# Patient Record
Sex: MALE | Race: WHITE | NOT HISPANIC OR LATINO | Employment: OTHER | URBAN - METROPOLITAN AREA
[De-identification: names, ages, dates, MRNs, and addresses within clinical notes are randomized per-mention and may not be internally consistent; named-entity substitution may affect disease eponyms.]

---

## 2017-01-05 RX ORDER — METOPROLOL TARTRATE 50 MG/1
TABLET, FILM COATED ORAL
Qty: 60 TABLET | Refills: 0 | Status: SHIPPED | OUTPATIENT
Start: 2017-01-05 | End: 2017-03-09 | Stop reason: SDUPTHER

## 2017-03-09 ENCOUNTER — OFFICE VISIT (OUTPATIENT)
Dept: CARDIOLOGY | Facility: CLINIC | Age: 67
End: 2017-03-09

## 2017-03-09 ENCOUNTER — CLINICAL SUPPORT NO REQUIREMENTS (OUTPATIENT)
Dept: CARDIOLOGY | Facility: CLINIC | Age: 67
End: 2017-03-09

## 2017-03-09 VITALS
SYSTOLIC BLOOD PRESSURE: 140 MMHG | DIASTOLIC BLOOD PRESSURE: 82 MMHG | HEART RATE: 63 BPM | HEIGHT: 71 IN | BODY MASS INDEX: 35.56 KG/M2 | WEIGHT: 254 LBS

## 2017-03-09 DIAGNOSIS — I10 ESSENTIAL HYPERTENSION: ICD-10-CM

## 2017-03-09 DIAGNOSIS — I49.5 SICK SINUS SYNDROME (HCC): Primary | ICD-10-CM

## 2017-03-09 PROCEDURE — 93280 PM DEVICE PROGR EVAL DUAL: CPT | Performed by: INTERNAL MEDICINE

## 2017-03-09 PROCEDURE — 99214 OFFICE O/P EST MOD 30 MIN: CPT | Performed by: INTERNAL MEDICINE

## 2017-03-09 RX ORDER — LEVOTHYROXINE SODIUM 0.1 MG/1
150 TABLET ORAL DAILY
COMMUNITY
End: 2018-01-24 | Stop reason: DRUGHIGH

## 2017-03-09 RX ORDER — METOPROLOL TARTRATE 50 MG/1
50 TABLET, FILM COATED ORAL 2 TIMES DAILY
Qty: 180 TABLET | Refills: 1 | Status: SHIPPED | OUTPATIENT
Start: 2017-03-09 | End: 2017-11-24 | Stop reason: SDUPTHER

## 2017-03-09 NOTE — PROGRESS NOTES
Date of Office Visit: 2017  Encounter Provider: Taz Bazan MD  Place of Service: [facility] CARDIOLOGY  Patient Name: Marco Antonio Kendrick  :1950      Chief Complaint   Patient presents with   • Irregular Heart Beat     History of Present Illness  The patient is a 66-year-old white male with a history of sick sinus syndrome.  He presents back to the office today for an annual follow-up.  Permanent pacemaker was implanted in  because of his arrhythmia.  He has not had any symptoms in the last year with the exception of mild fatigue.    Past Medical History   Diagnosis Date   • Arthritis    • Bladder wall thickening      MILD NON-SPECIFIC URINARY BLADDER....FOLLOWED BY DR COBURN   • Bone marrow suppression      SIGNIFICANT MYELOSUPRESSION FROM FOLFOX-4 CHEMOTHERAPY   • Cancer of sigmoid colon      STAGE III (T3N1M0)   • Disease of thyroid gland      HYPOTHYROIDISM   • Diverticula of colon    • Enlarged prostate    • Headache    • Hyperlipidemia    • Hypertension    • Obesity    • Pneumonia 2015   • PONV (postoperative nausea and vomiting)    • Sick sinus syndrome      S/P LEFT CHEST PACEMAKER IMPLANTATION   • Splenomegaly          Past Surgical History   Procedure Laterality Date   • Appendectomy     • Pacemaker implantation Left    • Cystoscopy       BY DR COBURN   • Colonoscopy  2015     DR. CAGE   • Colon surgery  2011     RESECTION   • Colonoscopy N/A 2016     Procedure: COLONOSCOPY to cecum and ti;  Surgeon: Leonel Cage MD;  Location: [facility] ENDOSCOPY;  Service:            Current Outpatient Prescriptions:   •  aspirin 81 MG chewable tablet, Chew 81 mg daily., Disp: , Rfl:   •  benazepril (LOTENSIN) 10 MG tablet, Take  by mouth daily., Disp: , Rfl:   •  Cholecalciferol (VITAMIN D PO), Take  by mouth., Disp: , Rfl:   •  diphenoxylate-atropine (LOMOTIL) 2.5-0.025 MG per tablet, Take 1 tablet by mouth every 6 (six) hours as needed for  "diarrhea., Disp: , Rfl:   •  levothyroxine (SYNTHROID, LEVOTHROID) 100 MCG tablet, Take 150 mcg by mouth Daily., Disp: , Rfl:   •  metoprolol tartrate (LOPRESSOR) 50 MG tablet, TAKE ONE TABLET BY MOUTH TWICE A DAY **MUST CALL MD FOR APPOINTMENT, Disp: 60 tablet, Rfl: 0  •  potassium chloride (KLOR-CON) 20 MEQ packet, Take 20 mEq by mouth 2 (two) times a day., Disp: , Rfl:   •  rosuvastatin (CRESTOR) 5 MG tablet, Take  by mouth daily., Disp: , Rfl:   •  sertraline (ZOLOFT) 50 MG tablet, Take 50 mg by mouth daily., Disp: , Rfl:       Social History     Social History   • Marital status:      Spouse name: N/A   • Number of children: N/A   • Years of education: N/A     Occupational History   • Not on file.     Social History Main Topics   • Smoking status: Never Smoker   • Smokeless tobacco: Never Used   • Alcohol use Yes      Comment: occasional   • Drug use: No   • Sexual activity: Defer     Other Topics Concern   • Not on file     Social History Narrative         Review of Systems   Constitution: Positive for malaise/fatigue.   HENT: Negative.    Eyes: Negative.    Cardiovascular: Negative.    Respiratory: Negative.    Endocrine: Negative.    Skin: Negative.    Musculoskeletal: Negative.    Gastrointestinal: Negative.    Neurological: Negative.    Psychiatric/Behavioral: Negative.        Procedures    Procedures       Objective:      Visit Vitals   • /82   • Pulse 63   • Ht 70.5\" (179.1 cm)   • Wt 254 lb (115 kg)   • BMI 35.93 kg/m2           Physical Exam   Constitutional: He is oriented to person, place, and time. He appears well-developed and well-nourished.   HENT:   Head: Normocephalic.   Eyes: Pupils are equal, round, and reactive to light.   Neck: Normal range of motion. No JVD present. Carotid bruit is not present. No thyromegaly present.   Cardiovascular: Normal rate, regular rhythm, S1 normal, S2 normal, normal heart sounds and intact distal pulses.  Exam reveals no gallop and no friction rub.  "   No murmur heard.  Pulmonary/Chest: Effort normal and breath sounds normal.   Abdominal: Soft. Bowel sounds are normal.   Musculoskeletal: He exhibits no edema.   Neurological: He is alert and oriented to person, place, and time.   Skin: Skin is warm, dry and intact. No erythema.   Psychiatric: He has a normal mood and affect.   Vitals reviewed.          Assessment:       Diagnosis Plan   1. Sick sinus syndrome     2. Essential hypertension              Plan:     No changes in medication are made today.  The patient will follow-up in a year

## 2017-04-17 ENCOUNTER — LAB (OUTPATIENT)
Dept: LAB | Facility: HOSPITAL | Age: 67
End: 2017-04-17

## 2017-04-17 DIAGNOSIS — C18.7 CANCER OF SIGMOID COLON (HCC): ICD-10-CM

## 2017-04-17 LAB
ALBUMIN SERPL-MCNC: 4.1 G/DL (ref 3.5–5.2)
ALBUMIN/GLOB SERPL: 1.4 G/DL (ref 1.1–2.4)
ALP SERPL-CCNC: 51 U/L (ref 38–116)
ALT SERPL W P-5'-P-CCNC: 15 U/L (ref 0–41)
ANION GAP SERPL CALCULATED.3IONS-SCNC: 12.6 MMOL/L
AST SERPL-CCNC: 16 U/L (ref 0–40)
BILIRUB SERPL-MCNC: 0.4 MG/DL (ref 0.1–1.2)
BUN BLD-MCNC: 14 MG/DL (ref 6–20)
BUN/CREAT SERPL: 12.4 (ref 7.3–30)
CALCIUM SPEC-SCNC: 8.3 MG/DL (ref 8.5–10.2)
CEA SERPL-MCNC: 1.13 NG/ML
CHLORIDE SERPL-SCNC: 103 MMOL/L (ref 98–107)
CO2 SERPL-SCNC: 25.4 MMOL/L (ref 22–29)
CREAT BLD-MCNC: 1.13 MG/DL (ref 0.7–1.3)
DEPRECATED RDW RBC AUTO: 43.4 FL (ref 37–49)
ERYTHROCYTE [DISTWIDTH] IN BLOOD BY AUTOMATED COUNT: 12.8 % (ref 11.7–14.5)
GFR SERPL CREATININE-BSD FRML MDRD: 65 ML/MIN/1.73
GLOBULIN UR ELPH-MCNC: 2.9 GM/DL (ref 1.8–3.5)
GLUCOSE BLD-MCNC: 141 MG/DL (ref 74–124)
HCT VFR BLD AUTO: 42.2 % (ref 40–49)
HGB BLD-MCNC: 13.9 G/DL (ref 13.5–16.5)
MCH RBC QN AUTO: 30.5 PG (ref 27–33)
MCHC RBC AUTO-ENTMCNC: 32.9 G/DL (ref 32–35)
MCV RBC AUTO: 92.7 FL (ref 83–97)
PLATELET # BLD AUTO: 192 10*3/MM3 (ref 150–375)
PMV BLD AUTO: 10.8 FL (ref 8.9–12.1)
POTASSIUM BLD-SCNC: 4.1 MMOL/L (ref 3.5–4.7)
PROT SERPL-MCNC: 7 G/DL (ref 6.3–8)
RBC # BLD AUTO: 4.55 10*6/MM3 (ref 4.3–5.5)
SODIUM BLD-SCNC: 141 MMOL/L (ref 134–145)
WBC NRBC COR # BLD: 5.21 10*3/MM3 (ref 4–10)

## 2017-04-17 PROCEDURE — 85027 COMPLETE CBC AUTOMATED: CPT

## 2017-04-17 PROCEDURE — 80053 COMPREHEN METABOLIC PANEL: CPT

## 2017-04-17 PROCEDURE — 36415 COLL VENOUS BLD VENIPUNCTURE: CPT

## 2017-04-17 PROCEDURE — 82378 CARCINOEMBRYONIC ANTIGEN: CPT

## 2017-04-24 ENCOUNTER — APPOINTMENT (OUTPATIENT)
Dept: LAB | Facility: HOSPITAL | Age: 67
End: 2017-04-24

## 2017-04-24 ENCOUNTER — OFFICE VISIT (OUTPATIENT)
Dept: ONCOLOGY | Facility: CLINIC | Age: 67
End: 2017-04-24

## 2017-04-24 VITALS
BODY MASS INDEX: 35.56 KG/M2 | HEART RATE: 64 BPM | DIASTOLIC BLOOD PRESSURE: 84 MMHG | TEMPERATURE: 97.8 F | RESPIRATION RATE: 18 BRPM | SYSTOLIC BLOOD PRESSURE: 142 MMHG | WEIGHT: 248.4 LBS | HEIGHT: 70 IN | OXYGEN SATURATION: 97 %

## 2017-04-24 DIAGNOSIS — C18.7 CANCER OF SIGMOID COLON (HCC): Primary | ICD-10-CM

## 2017-04-24 PROCEDURE — G0463 HOSPITAL OUTPT CLINIC VISIT: HCPCS | Performed by: INTERNAL MEDICINE

## 2017-04-24 PROCEDURE — 99213 OFFICE O/P EST LOW 20 MIN: CPT | Performed by: INTERNAL MEDICINE

## 2017-04-24 RX ORDER — LOVASTATIN 40 MG/1
40 TABLET ORAL NIGHTLY
COMMUNITY
Start: 2017-04-10 | End: 2018-09-14

## 2017-04-24 RX ORDER — SERTRALINE HYDROCHLORIDE 100 MG/1
150 TABLET, FILM COATED ORAL DAILY
COMMUNITY
Start: 2017-04-13 | End: 2018-03-26 | Stop reason: SDUPTHER

## 2017-04-24 NOTE — PROGRESS NOTES
Subjective .     REASONS FOR FOLLOWUP:  Colon cancer    HISTORY OF PRESENT ILLNESS:  The patient is a 66 y.o. year old male  who is here for follow-up with the above-mentioned history.    Denies nausea   Denies weight loss.  Denies pain.  Denies hematochezia or melena.    Denies any problems with bowel movements.      Past Medical History:   Diagnosis Date   • Arthritis    • Bladder wall thickening     MILD NON-SPECIFIC URINARY BLADDER....FOLLOWED BY DR COBURN   • Bone marrow suppression     SIGNIFICANT MYELOSUPRESSION FROM FOLFOX-4 CHEMOTHERAPY   • Cancer of sigmoid colon     STAGE III (T3N1M0)   • Disease of thyroid gland     HYPOTHYROIDISM   • Diverticula of colon    • Enlarged prostate    • H/O allergy to eggs    • Headache    • Hyperlipidemia    • Hypertension    • Hyperthyroidism    • Obesity    • Pneumonia 11/2015   • PONV (postoperative nausea and vomiting)    • Sick sinus syndrome     S/P LEFT CHEST PACEMAKER IMPLANTATION   • Splenomegaly        HEMATOLOGIC/ONCOLOGIC HISTORY:  (History from previous dates can be found in the separate document.)    MEDICATIONS    Current Outpatient Prescriptions:   •  aspirin 81 MG chewable tablet, Chew 81 mg daily., Disp: , Rfl:   •  benazepril (LOTENSIN) 10 MG tablet, Take  by mouth daily., Disp: , Rfl:   •  Cholecalciferol (VITAMIN D PO), Take  by mouth., Disp: , Rfl:   •  diphenoxylate-atropine (LOMOTIL) 2.5-0.025 MG per tablet, Take 1 tablet by mouth every 6 (six) hours as needed for diarrhea., Disp: , Rfl:   •  levothyroxine (SYNTHROID, LEVOTHROID) 100 MCG tablet, Take 150 mcg by mouth Daily., Disp: , Rfl:   •  lovastatin (MEVACOR) 40 MG tablet, , Disp: , Rfl:   •  metoprolol tartrate (LOPRESSOR) 50 MG tablet, Take 1 tablet by mouth 2 (Two) Times a Day., Disp: 180 tablet, Rfl: 1  •  potassium chloride (KLOR-CON) 20 MEQ packet, Take 20 mEq by mouth 2 (two) times a day., Disp: , Rfl:   •  rosuvastatin (CRESTOR) 5 MG tablet, Take  by mouth daily., Disp: , Rfl:   •   "sertraline (ZOLOFT) 100 MG tablet, Take 150 mg by mouth Daily., Disp: , Rfl:     ALLERGIES:     Allergies   Allergen Reactions   • Eggs Or Egg-Derived Products Nausea And Vomiting       SOCIAL HISTORY:       Social History     Social History   • Marital status:      Spouse name: Carmelita   • Number of children: 2   • Years of education: N/A     Occupational History   •  Retired     Social History Main Topics   • Smoking status: Never Smoker   • Smokeless tobacco: Never Used   • Alcohol use Yes      Comment: occasional   • Drug use: No   • Sexual activity: Defer     Other Topics Concern   • Not on file     Social History Narrative         FAMILY HISTORY:  Family History   Problem Relation Age of Onset   • Colon cancer Father    • Melanoma Brother        REVIEW OF SYSTEMS:  GENERAL: No change in appetite or weight;   No fevers, chills, sweats.    SKIN: No nonhealing lesions.   No rashes.  HEME/LYMPH: No easy bruising, bleeding.   No swollen nodes.   EYES: No vision changes or diplopia.   ENT: No tinnitus, hearing loss, gum bleeding, epistaxis, hoarseness or dysphagia.   RESPIRATORY: No cough, shortness of breath, hemoptysis or wheezing.   CVS: No chest pain, palpitations, orthopnea, dyspnea on exertion or PND.   GI: No melena or hematochezia.   No abdominal pain.  No nausea, vomiting, constipation, diarrhea  : No lower tract obstructive symptoms, dysuria or hematuria.   MUSCULOSKELETAL: No bone pain.  No joint stiffness.   NEUROLOGICAL: No global weakness, loss of consciousness or seizures.   PSYCHIATRIC: No increased nervousness, mood changes or depression.          Objective    Vitals:    04/24/17 1312   BP: 142/84   Pulse: 64   Resp: 18   Temp: 97.8 °F (36.6 °C)   TempSrc: Oral   SpO2: 97%   Weight: 248 lb 6.4 oz (113 kg)   Height: 70.08\" (178 cm)   PainSc: 0-No pain  Comment: Headaches off and on     Current Status 4/24/2017   ECOG score 0      PHYSICAL EXAM:    GENERAL:  Well-developed, well-nourished in " no acute distress.   SKIN:  Warm, dry without rashes, purpura or petechiae.  HEAD:  Normocephalic.  EYES:  Pupils equal, round and reactive to light.  EOMs intact.  Conjunctivae normal.  EARS:  Hearing intact.  NOSE:  Septum midline.  No excoriations or nasal discharge.  MOUTH:  Tongue is well-papillated; no stomatitis or ulcers.  Lips normal.  THROAT:  Oropharynx without lesions or exudates.  NECK:  Supple with good range of motion; no thyromegaly or masses, no JVD.  LYMPHATICS:  No cervical, supraclavicular, axillary or inguinal adenopathy.  CHEST:  Lungs clear to percussion and auscultation. Good airflow.  CARDIAC:  Regular rate and rhythm without murmurs, rubs or gallops. Normal S1,S2.  ABDOMEN:  Soft, nontender with no organomegaly or masses.  EXTREMITIES:  No clubbing, cyanosis or edema.  NEUROLOGICAL:  Cranial Nerves II-XII grossly intact.  No focal neurological deficits.  PSYCHIATRIC:  Normal affect and mood.      RECENT LABS:        WBC   Date/Time Value Ref Range Status   04/17/2017 02:22 PM 5.21 4.00 - 10.00 10*3/mm3 Final     Hemoglobin   Date/Time Value Ref Range Status   04/17/2017 02:22 PM 13.9 13.5 - 16.5 g/dL Final     Platelets   Date/Time Value Ref Range Status   04/17/2017 02:22  150 - 375 10*3/mm3 Final       Assessment/Plan     ASSESSMENT:  1.  Stage III colon cancer in 2011.  Appears to remain in remission.  Recent CEA unremarkable.    2.  Mild nonspecific urinary bladder wall thickening on CAT scan 5/6/14 which was similar to 4/30/13 CAT scan.  He saw Dr. Garcia of urology and a cystoscopy was unremarkable.  On CAT scan 4/13/16 he still has the mild nonspecific urinary bladder wall thickening of uncertain significance.  Since this is been worked up in the past, I did not send him to Dr. Garcia again.    3.  Obesity.  We discussed the benefits of weight loss such as reducing risk of future cancers as well as helping with other things such as cardiovascular health and blood sugar  control.    PLAN:  · M.D. 1 year.  One week prior:  · CBC, CMP, CEA  · Patient wants to maintain annual follow-up  · Colonoscopy 7/25/16, Dr. Cage: Unremarkable.  Next colonoscopy plan 5 years later.  · Planning no more scheduled CT scans.

## 2017-06-12 ENCOUNTER — CLINICAL SUPPORT NO REQUIREMENTS (OUTPATIENT)
Dept: CARDIOLOGY | Facility: CLINIC | Age: 67
End: 2017-06-12

## 2017-06-12 DIAGNOSIS — I49.5 SICK SINUS SYNDROME (HCC): Primary | ICD-10-CM

## 2017-06-12 PROCEDURE — 93296 REM INTERROG EVL PM/IDS: CPT | Performed by: INTERNAL MEDICINE

## 2017-06-12 PROCEDURE — 93294 REM INTERROG EVL PM/LDLS PM: CPT | Performed by: INTERNAL MEDICINE

## 2017-07-05 ENCOUNTER — TRANSCRIBE ORDERS (OUTPATIENT)
Dept: ADMINISTRATIVE | Facility: HOSPITAL | Age: 67
End: 2017-07-05

## 2017-07-05 ENCOUNTER — HOSPITAL ENCOUNTER (OUTPATIENT)
Dept: GENERAL RADIOLOGY | Facility: HOSPITAL | Age: 67
Discharge: HOME | End: 2017-07-05
Admitting: NURSE PRACTITIONER

## 2017-07-05 DIAGNOSIS — T14.8XXA HEMATOMA: ICD-10-CM

## 2017-07-05 DIAGNOSIS — T14.8XXA HEMATOMA: Primary | ICD-10-CM

## 2017-07-05 PROCEDURE — 74000 HC ABDOMEN KUB: CPT

## 2017-09-13 ENCOUNTER — TELEPHONE (OUTPATIENT)
Dept: CARDIOLOGY | Facility: CLINIC | Age: 67
End: 2017-09-13

## 2017-09-13 NOTE — TELEPHONE ENCOUNTER
Pt called. He has an appt with his dentist tomorrow and would like to know if he needs antibiotics.     Please advise.     Pt can be reached at [phone]

## 2017-09-25 ENCOUNTER — CLINICAL SUPPORT NO REQUIREMENTS (OUTPATIENT)
Dept: CARDIOLOGY | Facility: CLINIC | Age: 67
End: 2017-09-25

## 2017-09-25 DIAGNOSIS — I49.5 SINOATRIAL NODE DYSFUNCTION (HCC): Primary | ICD-10-CM

## 2017-09-25 PROCEDURE — 93280 PM DEVICE PROGR EVAL DUAL: CPT | Performed by: INTERNAL MEDICINE

## 2017-11-25 RX ORDER — METOPROLOL TARTRATE 50 MG/1
TABLET, FILM COATED ORAL
Qty: 180 TABLET | Refills: 0 | Status: SHIPPED | OUTPATIENT
Start: 2017-11-25 | End: 2018-04-23 | Stop reason: SDUPTHER

## 2017-12-27 ENCOUNTER — CLINICAL SUPPORT NO REQUIREMENTS (OUTPATIENT)
Dept: CARDIOLOGY | Facility: CLINIC | Age: 67
End: 2017-12-27

## 2017-12-27 DIAGNOSIS — I49.5 SICK SINUS SYNDROME (HCC): Primary | ICD-10-CM

## 2017-12-27 PROCEDURE — 93296 REM INTERROG EVL PM/IDS: CPT | Performed by: INTERNAL MEDICINE

## 2017-12-27 PROCEDURE — 93294 REM INTERROG EVL PM/LDLS PM: CPT | Performed by: INTERNAL MEDICINE

## 2018-01-24 ENCOUNTER — OFFICE VISIT (OUTPATIENT)
Dept: FAMILY MEDICINE CLINIC | Facility: CLINIC | Age: 68
End: 2018-01-24

## 2018-01-24 VITALS
TEMPERATURE: 98 F | DIASTOLIC BLOOD PRESSURE: 80 MMHG | HEIGHT: 71 IN | WEIGHT: 249 LBS | BODY MASS INDEX: 34.86 KG/M2 | SYSTOLIC BLOOD PRESSURE: 130 MMHG | OXYGEN SATURATION: 98 % | HEART RATE: 73 BPM

## 2018-01-24 DIAGNOSIS — E07.9 DISEASE OF THYROID GLAND: ICD-10-CM

## 2018-01-24 DIAGNOSIS — C18.7 CANCER OF SIGMOID COLON (HCC): Primary | ICD-10-CM

## 2018-01-24 DIAGNOSIS — Z79.01 LONG-TERM (CURRENT) USE OF ANTICOAGULANTS: ICD-10-CM

## 2018-01-24 DIAGNOSIS — J02.9 ACUTE PHARYNGITIS, UNSPECIFIED ETIOLOGY: ICD-10-CM

## 2018-01-24 DIAGNOSIS — I49.5 SICK SINUS SYNDROME (HCC): ICD-10-CM

## 2018-01-24 DIAGNOSIS — I10 ESSENTIAL HYPERTENSION: ICD-10-CM

## 2018-01-24 DIAGNOSIS — Z95.0 HISTORY OF PERMANENT CARDIAC PACEMAKER PLACEMENT: ICD-10-CM

## 2018-01-24 LAB
EXPIRATION DATE: NORMAL
INTERNAL CONTROL: NORMAL
Lab: NORMAL
S PYO AG THROAT QL: NEGATIVE

## 2018-01-24 PROCEDURE — 87880 STREP A ASSAY W/OPTIC: CPT | Performed by: INTERNAL MEDICINE

## 2018-01-24 PROCEDURE — G0008 ADMIN INFLUENZA VIRUS VAC: HCPCS | Performed by: INTERNAL MEDICINE

## 2018-01-24 PROCEDURE — 90662 IIV NO PRSV INCREASED AG IM: CPT | Performed by: INTERNAL MEDICINE

## 2018-01-24 PROCEDURE — 99203 OFFICE O/P NEW LOW 30 MIN: CPT | Performed by: INTERNAL MEDICINE

## 2018-01-24 RX ORDER — BENAZEPRIL HYDROCHLORIDE 10 MG/1
10 TABLET ORAL DAILY
Qty: 90 TABLET | Refills: 3 | Status: SHIPPED | OUTPATIENT
Start: 2018-01-24 | End: 2018-03-19 | Stop reason: SDUPTHER

## 2018-01-24 RX ORDER — LEVOTHYROXINE SODIUM 0.12 MG/1
125 TABLET ORAL DAILY
COMMUNITY
End: 2018-03-01 | Stop reason: SDUPTHER

## 2018-01-24 NOTE — PROGRESS NOTES
Subjective   Marco Antonio Kendrick is a 67 y.o. male. Patient is here today for establishing care as a new patient.  His previous doctor has quit.  Patient has known problems of hypertension, hypothyroidism, history of sick sinus syndrome and has had a cardiac pacemaker.  PMH-history of colon cancer with surgery and 2011.  Most recent colonoscopy 2016 by Dr. Cage and vesna.  Patient also has some depression controlled by sertraline.  SH-patient is .  He does have an allergy to egg products    his only acute complaint is a sore throat.  He has a granddaughter who is ill    Chief Complaint   Patient presents with   • Hypertension     THYROID- NEW PT HERE TO ESTABLISH           Vitals:    01/24/18 0944   BP: 130/80   Pulse: 73   Temp: 98 °F (36.7 °C)   SpO2: 98%     The following portions of the patient's history were reviewed and updated as appropriate: allergies, current medications, past family history, past medical history, past social history, past surgical history and problem list.    Past Medical History:   Diagnosis Date   • Arthritis    • Bladder wall thickening     MILD NON-SPECIFIC URINARY BLADDER....FOLLOWED BY DR COBURN   • Bone marrow suppression     SIGNIFICANT MYELOSUPRESSION FROM FOLFOX-4 CHEMOTHERAPY   • Cancer of sigmoid colon     STAGE III (T3N1M0)   • Disease of thyroid gland     HYPOTHYROIDISM   • Diverticula of colon    • Enlarged prostate    • H/O allergy to eggs    • Headache    • Hyperlipidemia    • Hypertension    • Hyperthyroidism    • Obesity    • Pneumonia 11/2015   • PONV (postoperative nausea and vomiting)    • Sick sinus syndrome     S/P LEFT CHEST PACEMAKER IMPLANTATION   • Splenomegaly       Allergies   Allergen Reactions   • Eggs Or Egg-Derived Products Nausea And Vomiting      Social History     Social History   • Marital status:      Spouse name: Carmelita   • Number of children: 2   • Years of education: N/A     Occupational History   •  Retired     Social History Main  Topics   • Smoking status: Never Smoker   • Smokeless tobacco: Never Used   • Alcohol use Yes      Comment: occasional   • Drug use: No   • Sexual activity: Defer     Other Topics Concern   • Not on file     Social History Narrative        Current Outpatient Prescriptions:   •  aspirin 81 MG chewable tablet, Chew 81 mg daily., Disp: , Rfl:   •  benazepril (LOTENSIN) 10 MG tablet, Take 1 tablet by mouth Daily., Disp: 90 tablet, Rfl: 3  •  Cholecalciferol (VITAMIN D PO), Take  by mouth., Disp: , Rfl:   •  levothyroxine (SYNTHROID, LEVOTHROID) 125 MCG tablet, Take 125 mcg by mouth Daily., Disp: , Rfl:   •  lovastatin (MEVACOR) 40 MG tablet, , Disp: , Rfl:   •  metoprolol tartrate (LOPRESSOR) 50 MG tablet, TAKE ONE TABLET BY MOUTH TWICE A DAY, Disp: 180 tablet, Rfl: 0  •  potassium chloride (KLOR-CON) 20 MEQ packet, Take 20 mEq by mouth 2 (two) times a day., Disp: , Rfl:   •  sertraline (ZOLOFT) 100 MG tablet, Take 150 mg by mouth Daily., Disp: , Rfl:      Objective     History of Present Illness     Review of Systems   Constitutional: Positive for chills. Negative for fever.   HENT: Positive for congestion, postnasal drip, rhinorrhea and sore throat.    Eyes: Negative.    Respiratory: Negative.    Cardiovascular: Negative.    Gastrointestinal: Negative.    Endocrine: Negative.    Genitourinary: Negative.    Musculoskeletal: Negative.    Skin: Negative.    Neurological: Negative.    Psychiatric/Behavioral: Negative.        Physical Exam   Constitutional: He is oriented to person, place, and time. He appears well-developed and well-nourished.   Pleasant, cooperative no distress blood pressure 138/80   HENT:   Head: Normocephalic and atraumatic.   Mouth/Throat: Uvula is midline, oropharynx is clear and moist and mucous membranes are normal. No tonsillar exudate.   There is some erythema in the left tonsillar pillar area but no exudate   Eyes: Conjunctivae are normal. Pupils are equal, round, and reactive to light. No  scleral icterus.   Neck: Normal range of motion. Neck supple. No thyromegaly present.   Cardiovascular: Normal rate, regular rhythm and normal heart sounds.    Pulmonary/Chest: Effort normal and breath sounds normal. No respiratory distress. He has no wheezes. He has no rales.   Musculoskeletal: Normal range of motion. He exhibits no edema.   Lymphadenopathy:     He has no cervical adenopathy.   Neurological: He is alert and oriented to person, place, and time.   Skin: Skin is warm and dry.   Psychiatric: He has a normal mood and affect. His behavior is normal.   Nursing note and vitals reviewed.      ASSESSMENT  rapid strep test was negative.  #1-hypertension, reasonable control  #2-history of hypothyroidism  #3-history of colon cancer 2011  #4-history of cardiac pacemaker  #5-hyperlipidemia  #6-acute pharyngitis, possibly viral      Problem List Items Addressed This Visit        Cardiovascular and Mediastinum    Sick sinus syndrome    BP (high blood pressure)    Relevant Medications    benazepril (LOTENSIN) 10 MG tablet       Digestive    Cancer of sigmoid colon - Primary       Endocrine    Disease of thyroid gland    Relevant Medications    levothyroxine (SYNTHROID, LEVOTHROID) 125 MCG tablet       Other    Long-term (current) use of anticoagulants    History of permanent cardiac pacemaker placement      Other Visit Diagnoses     Acute pharyngitis, unspecified etiology        Relevant Orders    POC Rapid Strep A          PLAN  The patient received a flu shot today.  The patient will use symptomatic care for his sore throat.  I would like to recheck the patient in one month with a CBC, CMP, NMR lipid panel, TSH and free T4 and PSA    There are no Patient Instructions on file for this visit.  No Follow-up on file.

## 2018-02-14 DIAGNOSIS — Z11.59 NEED FOR HEPATITIS C SCREENING TEST: ICD-10-CM

## 2018-02-14 DIAGNOSIS — E07.9 DISEASE OF THYROID GLAND: ICD-10-CM

## 2018-02-14 DIAGNOSIS — Z12.5 ENCOUNTER FOR SCREENING FOR MALIGNANT NEOPLASM OF PROSTATE: ICD-10-CM

## 2018-02-14 DIAGNOSIS — I10 ESSENTIAL HYPERTENSION: ICD-10-CM

## 2018-02-21 LAB
ALBUMIN SERPL-MCNC: 3.9 G/DL (ref 3.5–5.2)
ALBUMIN/GLOB SERPL: 1.3 G/DL
ALP SERPL-CCNC: 55 U/L (ref 39–117)
ALT SERPL-CCNC: 15 U/L (ref 1–41)
AST SERPL-CCNC: 14 U/L (ref 1–40)
BASOPHILS # BLD AUTO: 0.07 10*3/MM3 (ref 0–0.2)
BASOPHILS NFR BLD AUTO: 0.9 % (ref 0–1.5)
BILIRUB SERPL-MCNC: 0.5 MG/DL (ref 0.1–1.2)
BUN SERPL-MCNC: 15 MG/DL (ref 8–23)
BUN/CREAT SERPL: 10.5 (ref 7–25)
CALCIUM SERPL-MCNC: 9.2 MG/DL (ref 8.6–10.5)
CHLORIDE SERPL-SCNC: 103 MMOL/L (ref 98–107)
CHOLEST SERPL-MCNC: 162 MG/DL (ref 0–200)
CO2 SERPL-SCNC: 28.3 MMOL/L (ref 22–29)
CREAT SERPL-MCNC: 1.43 MG/DL (ref 0.76–1.27)
EOSINOPHIL # BLD AUTO: 0.14 10*3/MM3 (ref 0–0.7)
EOSINOPHIL NFR BLD AUTO: 1.9 % (ref 0.3–6.2)
ERYTHROCYTE [DISTWIDTH] IN BLOOD BY AUTOMATED COUNT: 13.9 % (ref 11.5–14.5)
GFR SERPLBLD CREATININE-BSD FMLA CKD-EPI: 49 ML/MIN/1.73
GFR SERPLBLD CREATININE-BSD FMLA CKD-EPI: 59 ML/MIN/1.73
GLOBULIN SER CALC-MCNC: 2.9 GM/DL
GLUCOSE SERPL-MCNC: 127 MG/DL (ref 65–99)
HCT VFR BLD AUTO: 42.6 % (ref 40.4–52.2)
HCV AB S/CO SERPL IA: <0.1 S/CO RATIO (ref 0–0.9)
HCV AB SERPL QL IA: NORMAL
HDLC SERPL-MCNC: 42 MG/DL (ref 40–60)
HGB BLD-MCNC: 13.6 G/DL (ref 13.7–17.6)
IMM GRANULOCYTES # BLD: 0 10*3/MM3 (ref 0–0.03)
IMM GRANULOCYTES NFR BLD: 0 % (ref 0–0.5)
LDLC SERPL CALC-MCNC: 88 MG/DL (ref 0–100)
LDLC/HDLC SERPL: 2.1 {RATIO}
LYMPHOCYTES # BLD AUTO: 3.37 10*3/MM3 (ref 0.9–4.8)
LYMPHOCYTES NFR BLD AUTO: 45 % (ref 19.6–45.3)
MCH RBC QN AUTO: 30.3 PG (ref 27–32.7)
MCHC RBC AUTO-ENTMCNC: 31.9 G/DL (ref 32.6–36.4)
MCV RBC AUTO: 94.9 FL (ref 79.8–96.2)
MONOCYTES # BLD AUTO: 0.5 10*3/MM3 (ref 0.2–1.2)
MONOCYTES NFR BLD AUTO: 6.7 % (ref 5–12)
NEUTROPHILS # BLD AUTO: 3.41 10*3/MM3 (ref 1.9–8.1)
NEUTROPHILS NFR BLD AUTO: 45.5 % (ref 42.7–76)
PLATELET # BLD AUTO: 202 10*3/MM3 (ref 140–500)
POTASSIUM SERPL-SCNC: 4.7 MMOL/L (ref 3.5–5.2)
PROT SERPL-MCNC: 6.8 G/DL (ref 6–8.5)
PSA SERPL-MCNC: 2.21 NG/ML (ref 0–4)
RBC # BLD AUTO: 4.49 10*6/MM3 (ref 4.6–6)
SODIUM SERPL-SCNC: 142 MMOL/L (ref 136–145)
T4 FREE SERPL-MCNC: 0.95 NG/DL (ref 0.93–1.7)
TRIGL SERPL-MCNC: 159 MG/DL (ref 0–150)
TSH SERPL DL<=0.005 MIU/L-ACNC: 9.69 MIU/ML (ref 0.27–4.2)
VLDLC SERPL CALC-MCNC: 31.8 MG/DL (ref 5–40)
WBC # BLD AUTO: 7.49 10*3/MM3 (ref 4.5–10.7)

## 2018-02-27 ENCOUNTER — TELEPHONE (OUTPATIENT)
Dept: FAMILY MEDICINE CLINIC | Facility: CLINIC | Age: 68
End: 2018-02-27

## 2018-02-27 NOTE — TELEPHONE ENCOUNTER
I HAVE CALLED PATIENT DAILY AND LEFT MESSAGES WITH NO RETURN CALL- I HAVE QUESTIONS ABOUT MEDICATIONS BEFORE I CAN SEND THEM OVER.     ----- Message from Alyse Massey MA sent at 2/20/2018  9:39 AM EST -----  Contact: PT  PT SAYS HE NEEDS ALL OF HIS CURRENT MEDICATIONS CALLED INTO THE MAIL ORDER FOR 90 DAY SUPPLY / HE DID NOT HAVE THEM WITH HIM HE WAS HERE FOR LABS AND ADVISED HE NEEDS ALL OF THEM CALLED IN PT CAN BE REACHED [phone]

## 2018-03-01 ENCOUNTER — OFFICE VISIT (OUTPATIENT)
Dept: FAMILY MEDICINE CLINIC | Facility: CLINIC | Age: 68
End: 2018-03-01

## 2018-03-01 VITALS
OXYGEN SATURATION: 95 % | TEMPERATURE: 97.8 F | SYSTOLIC BLOOD PRESSURE: 124 MMHG | HEIGHT: 71 IN | WEIGHT: 249.2 LBS | HEART RATE: 84 BPM | DIASTOLIC BLOOD PRESSURE: 86 MMHG | BODY MASS INDEX: 34.89 KG/M2

## 2018-03-01 DIAGNOSIS — R73.9 HYPERGLYCEMIA: ICD-10-CM

## 2018-03-01 DIAGNOSIS — I10 ESSENTIAL HYPERTENSION: Primary | ICD-10-CM

## 2018-03-01 DIAGNOSIS — E07.9 DISEASE OF THYROID GLAND: ICD-10-CM

## 2018-03-01 PROCEDURE — 99214 OFFICE O/P EST MOD 30 MIN: CPT | Performed by: INTERNAL MEDICINE

## 2018-03-01 PROCEDURE — G0009 ADMIN PNEUMOCOCCAL VACCINE: HCPCS | Performed by: INTERNAL MEDICINE

## 2018-03-01 PROCEDURE — 90670 PCV13 VACCINE IM: CPT | Performed by: INTERNAL MEDICINE

## 2018-03-01 RX ORDER — LEVOTHYROXINE SODIUM 0.15 MG/1
150 TABLET ORAL DAILY
Qty: 90 TABLET | Refills: 3 | Status: SHIPPED | OUTPATIENT
Start: 2018-03-01 | End: 2018-03-19 | Stop reason: SDUPTHER

## 2018-03-01 NOTE — PROGRESS NOTES
Subjective   Marco Antonio Kendrick is a 67 y.o. male. Patient is here today for follow-up on his hypertension, hypothyroidism.  He also has a history of colon cancer and has had a pacemaker placed.  He is generally feeling fine and is had no chest pain, shortness of breath, edema or myalgias.  He thinks his energy level is pretty reasonable.  He has not checked his blood pressure at home.  Chief Complaint   Patient presents with   • Hypothyroidism     HTN- FOLLOW UP LABS          Vitals:    03/01/18 0955   BP: 124/86   Pulse: 84   Temp: 97.8 °F (36.6 °C)   SpO2: 95%     The following portions of the patient's history were reviewed and updated as appropriate: allergies, current medications, past family history, past medical history, past social history, past surgical history and problem list.    Past Medical History:   Diagnosis Date   • Arthritis    • Bladder wall thickening     MILD NON-SPECIFIC URINARY BLADDER....FOLLOWED BY DR COBURN   • Bone marrow suppression     SIGNIFICANT MYELOSUPRESSION FROM FOLFOX-4 CHEMOTHERAPY   • Cancer of sigmoid colon     STAGE III (T3N1M0)   • Disease of thyroid gland     HYPOTHYROIDISM   • Diverticula of colon    • Enlarged prostate    • H/O allergy to eggs    • Headache    • Hyperlipidemia    • Hypertension    • Hyperthyroidism    • Obesity    • Pneumonia 11/2015   • PONV (postoperative nausea and vomiting)    • Sick sinus syndrome     S/P LEFT CHEST PACEMAKER IMPLANTATION   • Splenomegaly       Allergies   Allergen Reactions   • Eggs Or Egg-Derived Products Nausea And Vomiting      Social History     Social History   • Marital status:      Spouse name: Carmelita   • Number of children: 2   • Years of education: N/A     Occupational History   •  Retired     Social History Main Topics   • Smoking status: Never Smoker   • Smokeless tobacco: Never Used   • Alcohol use Yes      Comment: occasional   • Drug use: No   • Sexual activity: Defer     Other Topics Concern   • Not on file      Social History Narrative        Current Outpatient Prescriptions:   •  aspirin 81 MG chewable tablet, Chew 81 mg daily., Disp: , Rfl:   •  benazepril (LOTENSIN) 10 MG tablet, Take 1 tablet by mouth Daily., Disp: 90 tablet, Rfl: 3  •  Cholecalciferol (VITAMIN D PO), Take  by mouth., Disp: , Rfl:   •  levothyroxine (SYNTHROID, LEVOTHROID) 150 MCG tablet, Take 1 tablet by mouth Daily., Disp: 90 tablet, Rfl: 3  •  lovastatin (MEVACOR) 40 MG tablet, , Disp: , Rfl:   •  metoprolol tartrate (LOPRESSOR) 50 MG tablet, TAKE ONE TABLET BY MOUTH TWICE A DAY, Disp: 180 tablet, Rfl: 0  •  potassium chloride (KLOR-CON) 20 MEQ packet, Take 20 mEq by mouth 2 (two) times a day., Disp: , Rfl:   •  sertraline (ZOLOFT) 100 MG tablet, Take 150 mg by mouth Daily., Disp: , Rfl:      Objective     History of Present Illness     Review of Systems   Constitutional: Negative.    HENT: Negative.    Eyes: Negative.    Respiratory: Negative.    Cardiovascular: Negative.    Gastrointestinal: Negative.    Endocrine: Negative.    Genitourinary: Negative.    Musculoskeletal: Negative.    Skin: Negative.    Neurological: Negative.    Psychiatric/Behavioral: Negative.        Physical Exam   Constitutional: He is oriented to person, place, and time. He appears well-developed and well-nourished.   Pleasant, cooperative, in no distress with a blood pressure of 140/80   HENT:   Head: Normocephalic and atraumatic.   Eyes: Conjunctivae are normal. Pupils are equal, round, and reactive to light. No scleral icterus.   Neck: Normal range of motion. Neck supple. No thyromegaly present.   Cardiovascular: Normal rate, regular rhythm and normal heart sounds.    Pulmonary/Chest: Effort normal and breath sounds normal. No respiratory distress. He has no wheezes. He has no rales.   Musculoskeletal: Normal range of motion. He exhibits no edema.   Neurological: He is alert and oriented to person, place, and time.   Skin: Skin is warm and dry.   Psychiatric: He has a  normal mood and affect. His behavior is normal.   Nursing note and vitals reviewed.      ASSESSMENT  CBC has a minimally low RBC and hemoglobin of 13.6 hematocrit is normal.  Indices are essentially normal.  CMP has an elevated sugar of 127 and an elevated creatinine of 1.43 which is new and otherwise is normal.  Lipid panel is fine with a total cholesterol 162, HDL 42, LDL 88 triglycerides 159.  PSA and hepatitis C screens were both normal or negative.  TSH is elevated at 9.69 and free T4, although normal is quite low at 0.95  #1-hypertension, bit borderline today  #2-hyperglycemia, asymptomatic  #3-hypothyroidism, elevated TSH today  #4-elevated creatinine today     Problem List Items Addressed This Visit        Cardiovascular and Mediastinum    BP (high blood pressure) - Primary       Endocrine    Disease of thyroid gland    Relevant Medications    levothyroxine (SYNTHROID, LEVOTHROID) 150 MCG tablet          PLAN  The patient received a Prevnar 13 vaccination today.  I'm increasing his levothyroxin 150 µg daily.  I would like to recheck him in 3 months with a CBC, CMP, hemoglobin A1c, TSH and free T4 and lipid panel    There are no Patient Instructions on file for this visit.  Return in about 3 months (around 6/1/2018) for with labs.

## 2018-03-14 ENCOUNTER — OFFICE VISIT (OUTPATIENT)
Dept: CARDIOLOGY | Facility: CLINIC | Age: 68
End: 2018-03-14

## 2018-03-14 ENCOUNTER — CLINICAL SUPPORT NO REQUIREMENTS (OUTPATIENT)
Dept: CARDIOLOGY | Facility: CLINIC | Age: 68
End: 2018-03-14

## 2018-03-14 VITALS — DIASTOLIC BLOOD PRESSURE: 92 MMHG | HEART RATE: 64 BPM | SYSTOLIC BLOOD PRESSURE: 144 MMHG

## 2018-03-14 DIAGNOSIS — I49.5 SICK SINUS SYNDROME (HCC): Primary | ICD-10-CM

## 2018-03-14 DIAGNOSIS — I10 ESSENTIAL HYPERTENSION: ICD-10-CM

## 2018-03-14 PROCEDURE — 93280 PM DEVICE PROGR EVAL DUAL: CPT | Performed by: INTERNAL MEDICINE

## 2018-03-14 PROCEDURE — 99214 OFFICE O/P EST MOD 30 MIN: CPT | Performed by: INTERNAL MEDICINE

## 2018-03-14 NOTE — PROGRESS NOTES
Date of Office Visit: 2018  Encounter Provider: Taz Bazan MD  Place of Service: [facility]  Patient Name: Marco Antonio Kendrick  :1950      Chief Complaint   Patient presents with   • Sick sinus syndrome     History of Present Illness    An is a 67-year-old white male with a history of sick sinus syndrome: Status post permanent pacemaker implant.  He also has a history of hypertension.  He returns to the office today for follow-up examination.  He does not complain of any chest pain, shortness of breath, lightheadedness, dizziness nor orthopnea.  Occasionally he'll have some fatigue issues.    Past Medical History:   Diagnosis Date   • Arthritis    • Bladder wall thickening     MILD NON-SPECIFIC URINARY BLADDER....FOLLOWED BY DR COBURN   • Bone marrow suppression     SIGNIFICANT MYELOSUPRESSION FROM FOLFOX-4 CHEMOTHERAPY   • Cancer of sigmoid colon     STAGE III (T3N1M0)   • Disease of thyroid gland     HYPOTHYROIDISM   • Diverticula of colon    • Enlarged prostate    • H/O allergy to eggs    • Headache    • Hyperlipidemia    • Hypertension    • Hyperthyroidism    • Obesity    • Pneumonia 2015   • PONV (postoperative nausea and vomiting)    • Sick sinus syndrome     S/P LEFT CHEST PACEMAKER IMPLANTATION   • Splenomegaly          Past Surgical History:   Procedure Laterality Date   • APPENDECTOMY     • COLON SURGERY      RESECTION   • COLONOSCOPY  2015    DR. CGAE   • COLONOSCOPY N/A 2016    Procedure: COLONOSCOPY to cecum and ti;  Surgeon: Leonel Cage MD;  Location: [facility];  Service:    • CYSTOSCOPY      BY DR COBURN   • PACEMAKER IMPLANTATION Left            Current Outpatient Prescriptions:   •  aspirin 81 MG chewable tablet, Chew 81 mg daily., Disp: , Rfl:   •  benazepril (LOTENSIN) 10 MG tablet, Take 1 tablet by mouth Daily., Disp: 90 tablet, Rfl: 3  •  Cholecalciferol (VITAMIN D PO), Take  by mouth., Disp: , Rfl:    •  levothyroxine (SYNTHROID, LEVOTHROID) 150 MCG tablet, Take 1 tablet by mouth Daily., Disp: 90 tablet, Rfl: 3  •  lovastatin (MEVACOR) 40 MG tablet, Take 40 mg by mouth Every Night., Disp: , Rfl:   •  metoprolol tartrate (LOPRESSOR) 50 MG tablet, TAKE ONE TABLET BY MOUTH TWICE A DAY, Disp: 180 tablet, Rfl: 0  •  potassium chloride (KLOR-CON) 20 MEQ packet, Take 20 mEq by mouth 2 (two) times a day., Disp: , Rfl:   •  sertraline (ZOLOFT) 100 MG tablet, Take 150 mg by mouth Daily., Disp: , Rfl:       Social History     Social History   • Marital status:      Spouse name: Carmelita   • Number of children: 2   • Years of education: N/A     Occupational History   •  Retired     Social History Main Topics   • Smoking status: Never Smoker   • Smokeless tobacco: Never Used   • Alcohol use Yes      Comment: occasional   • Drug use: No   • Sexual activity: Defer     Other Topics Concern   • Not on file     Social History Narrative   • No narrative on file         Review of Systems   Constitution: Positive for malaise/fatigue.   HENT: Negative.    Eyes: Negative.    Cardiovascular: Negative.    Respiratory: Negative.    Endocrine: Negative.    Skin: Negative.    Musculoskeletal: Negative.    Gastrointestinal: Negative.    Neurological: Negative.    Psychiatric/Behavioral: Negative.        Procedures    Procedures        Objective:    /92   Pulse 64         Physical Exam   Constitutional: He is oriented to person, place, and time. He appears well-developed and well-nourished.   HENT:   Head: Normocephalic.   Eyes: Pupils are equal, round, and reactive to light.   Neck: Normal range of motion. No JVD present. Carotid bruit is not present. No thyromegaly present.   Cardiovascular: Normal rate, regular rhythm, S1 normal, S2 normal, normal heart sounds and intact distal pulses.  Exam reveals no gallop and no friction rub.    No murmur heard.  Pulmonary/Chest: Effort normal and breath sounds normal.   Abdominal: Soft.  Bowel sounds are normal.   Musculoskeletal: He exhibits no edema.   Neurological: He is alert and oriented to person, place, and time.   Skin: Skin is warm, dry and intact. No erythema.   Psychiatric: He has a normal mood and affect.   Vitals reviewed.          Assessment:       Diagnosis Plan   1. Sick sinus syndrome     2. Essential hypertension       1.  Sick sinus syndrome: Status post permanent pacemaker implant stable at this time  2.  Hypertension: Controlled on medical therapy       Plan:       Follow-up in one year.

## 2018-03-19 RX ORDER — BENAZEPRIL HYDROCHLORIDE 10 MG/1
10 TABLET ORAL DAILY
Qty: 90 TABLET | Refills: 1 | Status: SHIPPED | OUTPATIENT
Start: 2018-03-19 | End: 2018-08-29 | Stop reason: SDUPTHER

## 2018-03-19 RX ORDER — LEVOTHYROXINE SODIUM 0.15 MG/1
150 TABLET ORAL DAILY
Qty: 90 TABLET | Refills: 1 | Status: SHIPPED | OUTPATIENT
Start: 2018-03-19 | End: 2018-06-08 | Stop reason: SDUPTHER

## 2018-03-26 RX ORDER — SERTRALINE HYDROCHLORIDE 100 MG/1
150 TABLET, FILM COATED ORAL DAILY
Qty: 135 TABLET | Refills: 1 | Status: SHIPPED | OUTPATIENT
Start: 2018-03-26 | End: 2018-08-29 | Stop reason: SDUPTHER

## 2018-04-23 RX ORDER — METOPROLOL TARTRATE 50 MG/1
TABLET, FILM COATED ORAL
Qty: 180 TABLET | Refills: 1 | Status: SHIPPED | OUTPATIENT
Start: 2018-04-23 | End: 2018-12-24 | Stop reason: SDUPTHER

## 2018-04-24 ENCOUNTER — LAB (OUTPATIENT)
Dept: LAB | Facility: HOSPITAL | Age: 68
End: 2018-04-24

## 2018-04-24 DIAGNOSIS — C18.7 CANCER OF SIGMOID COLON (HCC): ICD-10-CM

## 2018-04-24 LAB
ALBUMIN SERPL-MCNC: 4 G/DL (ref 3.5–5.2)
ALBUMIN/GLOB SERPL: 1.2 G/DL (ref 1.1–2.4)
ALP SERPL-CCNC: 55 U/L (ref 38–116)
ALT SERPL W P-5'-P-CCNC: 13 U/L (ref 0–41)
ANION GAP SERPL CALCULATED.3IONS-SCNC: 11.1 MMOL/L
AST SERPL-CCNC: 15 U/L (ref 0–40)
BASOPHILS # BLD AUTO: 0.1 10*3/MM3 (ref 0–0.1)
BASOPHILS NFR BLD AUTO: 1.7 % (ref 0–1.1)
BILIRUB SERPL-MCNC: 0.5 MG/DL (ref 0.1–1.2)
BUN BLD-MCNC: 12 MG/DL (ref 6–20)
BUN/CREAT SERPL: 9.6 (ref 7.3–30)
CALCIUM SPEC-SCNC: 9.1 MG/DL (ref 8.5–10.2)
CEA SERPL-MCNC: 1.63 NG/ML
CHLORIDE SERPL-SCNC: 103 MMOL/L (ref 98–107)
CO2 SERPL-SCNC: 27.9 MMOL/L (ref 22–29)
CREAT BLD-MCNC: 1.25 MG/DL (ref 0.7–1.3)
DEPRECATED RDW RBC AUTO: 43.5 FL (ref 37–49)
EOSINOPHIL # BLD AUTO: 0.12 10*3/MM3 (ref 0–0.36)
EOSINOPHIL NFR BLD AUTO: 2.1 % (ref 1–5)
ERYTHROCYTE [DISTWIDTH] IN BLOOD BY AUTOMATED COUNT: 13 % (ref 11.7–14.5)
GFR SERPL CREATININE-BSD FRML MDRD: 57 ML/MIN/1.73
GLOBULIN UR ELPH-MCNC: 3.3 GM/DL (ref 1.8–3.5)
GLUCOSE BLD-MCNC: 139 MG/DL (ref 74–124)
HCT VFR BLD AUTO: 42.8 % (ref 40–49)
HGB BLD-MCNC: 14.1 G/DL (ref 13.5–16.5)
IMM GRANULOCYTES # BLD: 0.01 10*3/MM3 (ref 0–0.03)
IMM GRANULOCYTES NFR BLD: 0.2 % (ref 0–0.5)
LYMPHOCYTES # BLD AUTO: 2.2 10*3/MM3 (ref 1–3.5)
LYMPHOCYTES NFR BLD AUTO: 38.2 % (ref 20–49)
MCH RBC QN AUTO: 30.3 PG (ref 27–33)
MCHC RBC AUTO-ENTMCNC: 32.9 G/DL (ref 32–35)
MCV RBC AUTO: 91.8 FL (ref 83–97)
MONOCYTES # BLD AUTO: 0.34 10*3/MM3 (ref 0.25–0.8)
MONOCYTES NFR BLD AUTO: 5.9 % (ref 4–12)
NEUTROPHILS # BLD AUTO: 2.99 10*3/MM3 (ref 1.5–7)
NEUTROPHILS NFR BLD AUTO: 51.9 % (ref 39–75)
NRBC BLD MANUAL-RTO: 0 /100 WBC (ref 0–0)
PLATELET # BLD AUTO: 206 10*3/MM3 (ref 150–375)
PMV BLD AUTO: 10.6 FL (ref 8.9–12.1)
POTASSIUM BLD-SCNC: 4.2 MMOL/L (ref 3.5–4.7)
PROT SERPL-MCNC: 7.3 G/DL (ref 6.3–8)
RBC # BLD AUTO: 4.66 10*6/MM3 (ref 4.3–5.5)
SODIUM BLD-SCNC: 142 MMOL/L (ref 134–145)
WBC NRBC COR # BLD: 5.76 10*3/MM3 (ref 4–10)

## 2018-04-24 PROCEDURE — 85025 COMPLETE CBC W/AUTO DIFF WBC: CPT | Performed by: INTERNAL MEDICINE

## 2018-04-24 PROCEDURE — 80053 COMPREHEN METABOLIC PANEL: CPT | Performed by: INTERNAL MEDICINE

## 2018-04-24 PROCEDURE — 82378 CARCINOEMBRYONIC ANTIGEN: CPT | Performed by: INTERNAL MEDICINE

## 2018-04-24 PROCEDURE — 36415 COLL VENOUS BLD VENIPUNCTURE: CPT | Performed by: INTERNAL MEDICINE

## 2018-05-01 ENCOUNTER — OFFICE VISIT (OUTPATIENT)
Dept: ONCOLOGY | Facility: CLINIC | Age: 68
End: 2018-05-01

## 2018-05-01 ENCOUNTER — APPOINTMENT (OUTPATIENT)
Dept: LAB | Facility: HOSPITAL | Age: 68
End: 2018-05-01

## 2018-05-01 VITALS
RESPIRATION RATE: 16 BRPM | SYSTOLIC BLOOD PRESSURE: 122 MMHG | DIASTOLIC BLOOD PRESSURE: 80 MMHG | HEIGHT: 71 IN | TEMPERATURE: 99 F | OXYGEN SATURATION: 96 % | WEIGHT: 250 LBS | HEART RATE: 80 BPM | BODY MASS INDEX: 35 KG/M2

## 2018-05-01 DIAGNOSIS — C18.7 CANCER OF SIGMOID COLON (HCC): Primary | ICD-10-CM

## 2018-05-01 PROCEDURE — G0463 HOSPITAL OUTPT CLINIC VISIT: HCPCS | Performed by: INTERNAL MEDICINE

## 2018-05-01 PROCEDURE — 99213 OFFICE O/P EST LOW 20 MIN: CPT | Performed by: INTERNAL MEDICINE

## 2018-05-01 NOTE — PROGRESS NOTES
Subjective .     REASONS FOR FOLLOWUP:  Colon cancer    HISTORY OF PRESENT ILLNESS:  The patient is a 67 y.o. year old male  who is here for follow-up with the above-mentioned history.    Bowel movements regular.  Denies bleeding.  Denies weight loss.  Denies nausea.  Denies pain.  No new problems.    Past Medical History:   Diagnosis Date   • Arthritis    • Bipolar disorder    • Bladder wall thickening     MILD NON-SPECIFIC URINARY BLADDER....FOLLOWED BY DR COBURN   • Bone marrow suppression     SIGNIFICANT MYELOSUPRESSION FROM FOLFOX-4 CHEMOTHERAPY   • Cancer of sigmoid colon     STAGE III (T3N1M0)   • Depression    • Disease of thyroid gland     HYPOTHYROIDISM   • Diverticula of colon    • Enlarged prostate    • H/O allergy to eggs    • Headache    • Hyperlipidemia    • Hypertension    • Hyperthyroidism    • Obesity    • Pneumonia 11/2015   • PONV (postoperative nausea and vomiting)    • Sick sinus syndrome     S/P LEFT CHEST PACEMAKER IMPLANTATION   • Splenomegaly        HEMATOLOGIC/ONCOLOGIC HISTORY:  (History from previous dates can be found in the separate document.)    MEDICATIONS    Current Outpatient Prescriptions:   •  aspirin 81 MG chewable tablet, Chew 81 mg daily., Disp: , Rfl:   •  benazepril (LOTENSIN) 10 MG tablet, Take 1 tablet by mouth Daily., Disp: 90 tablet, Rfl: 1  •  Cholecalciferol (VITAMIN D PO), Take  by mouth., Disp: , Rfl:   •  levothyroxine (SYNTHROID, LEVOTHROID) 150 MCG tablet, Take 1 tablet by mouth Daily., Disp: 90 tablet, Rfl: 1  •  lovastatin (MEVACOR) 40 MG tablet, Take 40 mg by mouth Every Night., Disp: , Rfl:   •  metoprolol tartrate (LOPRESSOR) 50 MG tablet, TAKE ONE TABLET BY MOUTH TWICE A DAY, Disp: 180 tablet, Rfl: 1  •  potassium chloride (KLOR-CON) 20 MEQ packet, Take 20 mEq by mouth 2 (two) times a day., Disp: , Rfl:   •  sertraline (ZOLOFT) 100 MG tablet, Take 1.5 tablets by mouth Daily., Disp: 135 tablet, Rfl: 1    ALLERGIES:     Allergies   Allergen Reactions   •  "Eggs Or Egg-Derived Products Nausea And Vomiting       SOCIAL HISTORY:       Social History     Social History   • Marital status:      Spouse name: Carmelita   • Number of children: 2   • Years of education: N/A     Occupational History   •  Retired     Social History Main Topics   • Smoking status: Never Smoker   • Smokeless tobacco: Never Used   • Alcohol use Yes      Comment: occasional   • Drug use: No   • Sexual activity: Defer     Other Topics Concern   • Not on file     Social History Narrative   • No narrative on file         FAMILY HISTORY:  Family History   Problem Relation Age of Onset   • Colon cancer Father    • Melanoma Brother    • Heart disease Other    • Hypertension Other    • Diabetes Other    • Cancer Other    • Stroke Other        REVIEW OF SYSTEMS:  Review of Systems   Constitutional: Negative for activity change.   HENT: Negative for nosebleeds and trouble swallowing.    Respiratory: Negative for shortness of breath and wheezing.    Cardiovascular: Negative for chest pain and palpitations.   Gastrointestinal: Negative for constipation, diarrhea and nausea.   Genitourinary: Negative for dysuria and hematuria.   Musculoskeletal: Negative for arthralgias and myalgias.   Neurological: Negative for seizures and syncope.   Hematological: Negative for adenopathy. Does not bruise/bleed easily.   Psychiatric/Behavioral: Negative for confusion.           Objective    Vitals:    05/01/18 1603   BP: 122/80   Pulse: 80   Resp: 16   Temp: 99 °F (37.2 °C)   TempSrc: Oral   SpO2: 96%   Weight: 113 kg (250 lb)   Height: 180 cm (70.87\")   PainSc: 0-No pain     Current Status 5/1/2018   ECOG score 0      PHYSICAL EXAM:    CONSTITUTIONAL:  Vital signs reviewed.  No distress, looks comfortable.  EYES:  Conjunctiva and lids unremarkable.  PERRLA  EARS,NOSE,MOUTH,THROAT:  Ears and nose appear unremarkable.  Lips, teeth, gums appear unremarkable.  RESPIRATORY:  Normal respiratory effort.  Lungs clear to " auscultation bilaterally.  CARDIOVASCULAR:  Normal S1, S2.  No murmurs rubs or gallops.  No significant lower extremity edema.  GASTROINTESTINAL: Abdomen appears unremarkable.  Nontender.  No hepatomegaly.  No splenomegaly.  LYMPHATIC:  No cervical, supraclavicular, axillary lymphadenopathy.  MUSCULOSKELETAL:  Unremarkable gait and station.  Unremarkable digits/nails.  No cyanosis or clubbing.  SKIN:  Warm.  No rashes.  PSYCHIATRIC:  Normal judgment and insight.  Normal mood and affect.   RECENT LABS:        WBC   Date/Time Value Ref Range Status   04/24/2018 02:10 PM 5.76 4.00 - 10.00 10*3/mm3 Final     Hemoglobin   Date/Time Value Ref Range Status   04/24/2018 02:10 PM 14.1 13.5 - 16.5 g/dL Final     Platelets   Date/Time Value Ref Range Status   04/24/2018 02:10  150 - 375 10*3/mm3 Final       Assessment/Plan     ASSESSMENT:  1.  Stage III colon cancer in 2011.  Appears to remain in remission.  Recent CEA unremarkable.    2.  Mild nonspecific urinary bladder wall thickening on CAT scan 5/6/14 which was similar to 4/30/13 CAT scan.  He saw Dr. Garcia of urology and a cystoscopy was unremarkable.  On CAT scan 4/13/16 he still has the mild nonspecific urinary bladder wall thickening of uncertain significance.  Since this is been worked up in the past, I did not send him to Dr. Garcia again.    3.  Obesity.  We have discussed the benefits of weight loss such as reducing risk of future cancers as well as helping with other things such as cardiovascular health and blood sugar control.  Unfortunately, his weight remains stable.    PLAN:  · M.D. 1 year.  One week prior:  · CBC, CMP, CEA  · Patient wants to maintain annual follow-up  · Colonoscopy 7/25/16, Dr. Cage: Unremarkable.  Next colonoscopy planned 5 years later.  · Planning no more scheduled CT scans.

## 2018-05-09 ENCOUNTER — OFFICE VISIT (OUTPATIENT)
Dept: FAMILY MEDICINE CLINIC | Facility: CLINIC | Age: 68
End: 2018-05-09

## 2018-05-09 VITALS
HEIGHT: 70 IN | OXYGEN SATURATION: 97 % | TEMPERATURE: 97.5 F | WEIGHT: 245 LBS | SYSTOLIC BLOOD PRESSURE: 130 MMHG | BODY MASS INDEX: 35.07 KG/M2 | HEART RATE: 87 BPM | DIASTOLIC BLOOD PRESSURE: 82 MMHG | RESPIRATION RATE: 17 BRPM

## 2018-05-09 DIAGNOSIS — J01.10 ACUTE FRONTAL SINUSITIS, RECURRENCE NOT SPECIFIED: Primary | ICD-10-CM

## 2018-05-09 PROCEDURE — 99213 OFFICE O/P EST LOW 20 MIN: CPT | Performed by: NURSE PRACTITIONER

## 2018-05-09 RX ORDER — CEFUROXIME AXETIL 250 MG/1
250 TABLET ORAL 2 TIMES DAILY
Qty: 20 TABLET | Refills: 0 | Status: SHIPPED | OUTPATIENT
Start: 2018-05-09 | End: 2019-01-03

## 2018-05-09 NOTE — PROGRESS NOTES
Subjective   Marco Antonio Kendrick is a 67 y.o. male.   Chief Complaint   Patient presents with   • Sore Throat     x 4 days   • Cough     x 4 days     Vitals:    05/09/18 1101   BP: 130/82   Pulse: 87   Resp: 17   Temp: 97.5 °F (36.4 °C)   SpO2: 97%     No LMP for male patient.    History of Present Illness Sinus Pain  Patient complains of congestion, facial pain, nasal congestion, post nasal drip, sinus pressure and sore throat. Onset of symptoms was 4 days ago. Symptoms have been unchanged since that time. He is drinking plenty of fluids.  Past history is significant for allergic rhinitis . Patient is non-smoker.     The following portions of the patient's history were reviewed and updated as appropriate: allergies, current medications, past family history, past medical history, past social history, past surgical history and problem list.    Review of Systems   Constitutional: Positive for fatigue. Negative for chills and fever.   HENT: Positive for congestion, postnasal drip, rhinorrhea, sinus pain, sinus pressure, sneezing and sore throat.    Respiratory: Negative.    Cardiovascular: Negative.    Gastrointestinal: Positive for vomiting (vomited once last night ).   Genitourinary: Negative.    Musculoskeletal: Negative for arthralgias.       Objective   Physical Exam   Constitutional: Vital signs are normal. He appears well-developed and well-nourished. He appears ill. No distress.   HENT:   Right Ear: Tympanic membrane and ear canal normal.   Left Ear: Tympanic membrane and ear canal normal.   Nose: Mucosal edema and rhinorrhea present. Right sinus exhibits frontal sinus tenderness. Right sinus exhibits no maxillary sinus tenderness. Left sinus exhibits frontal sinus tenderness. Left sinus exhibits no maxillary sinus tenderness.   Mouth/Throat: Uvula is midline. Posterior oropharyngeal erythema present.   Cardiovascular: Normal rate and regular rhythm.    Pulmonary/Chest: Effort normal and breath sounds normal.    Neurological: He is alert.   Skin: Skin is warm and dry.   Psychiatric: He has a normal mood and affect.       Assessment/Plan   Marco Antonio was seen today for sore throat and cough.    Diagnoses and all orders for this visit:    Acute frontal sinusitis, recurrence not specified    Other orders  -     cefuroxime (CEFTIN) 250 MG tablet; Take 1 tablet by mouth 2 (Two) Times a Day.      Continue antihistamines   Sinus rinses with saline  Rest and fluids  Follow up in 2 weeks if your symptoms persist, or sooner if your symptoms worsen or if new symptoms develop

## 2018-05-25 DIAGNOSIS — Z13.220 SCREENING FOR CHOLESTEROL LEVEL: Primary | ICD-10-CM

## 2018-05-25 DIAGNOSIS — E07.9 DISEASE OF THYROID GLAND: ICD-10-CM

## 2018-05-25 DIAGNOSIS — R73.9 HYPERGLYCEMIA: ICD-10-CM

## 2018-06-01 LAB
ALBUMIN SERPL-MCNC: 4 G/DL (ref 3.5–5.2)
ALBUMIN/GLOB SERPL: 1.3 G/DL
ALP SERPL-CCNC: 58 U/L (ref 39–117)
ALT SERPL-CCNC: 13 U/L (ref 1–41)
AST SERPL-CCNC: 13 U/L (ref 1–40)
BASOPHILS # BLD AUTO: 0.09 10*3/MM3 (ref 0–0.2)
BASOPHILS NFR BLD AUTO: 1.6 % (ref 0–1.5)
BILIRUB SERPL-MCNC: 0.3 MG/DL (ref 0.1–1.2)
BUN SERPL-MCNC: 14 MG/DL (ref 8–23)
BUN/CREAT SERPL: 11.5 (ref 7–25)
CALCIUM SERPL-MCNC: 9.1 MG/DL (ref 8.6–10.5)
CHLORIDE SERPL-SCNC: 103 MMOL/L (ref 98–107)
CHOLEST SERPL-MCNC: 194 MG/DL (ref 0–200)
CO2 SERPL-SCNC: 27.1 MMOL/L (ref 22–29)
CREAT SERPL-MCNC: 1.22 MG/DL (ref 0.76–1.27)
EOSINOPHIL # BLD AUTO: 0.16 10*3/MM3 (ref 0–0.7)
EOSINOPHIL NFR BLD AUTO: 2.8 % (ref 0.3–6.2)
ERYTHROCYTE [DISTWIDTH] IN BLOOD BY AUTOMATED COUNT: 13.8 % (ref 11.5–14.5)
GFR SERPLBLD CREATININE-BSD FMLA CKD-EPI: 59 ML/MIN/1.73
GFR SERPLBLD CREATININE-BSD FMLA CKD-EPI: 71 ML/MIN/1.73
GLOBULIN SER CALC-MCNC: 3.1 GM/DL
GLUCOSE SERPL-MCNC: 124 MG/DL (ref 65–99)
HBA1C MFR BLD: 6.5 % (ref 4.8–5.6)
HCT VFR BLD AUTO: 44.5 % (ref 40.4–52.2)
HDLC SERPL-MCNC: 43 MG/DL (ref 40–60)
HGB BLD-MCNC: 14.8 G/DL (ref 13.7–17.6)
IMM GRANULOCYTES # BLD: 0.01 10*3/MM3 (ref 0–0.03)
IMM GRANULOCYTES NFR BLD: 0.2 % (ref 0–0.5)
LDLC SERPL CALC-MCNC: 116 MG/DL (ref 0–100)
LDLC/HDLC SERPL: 2.7 {RATIO}
LYMPHOCYTES # BLD AUTO: 2.69 10*3/MM3 (ref 0.9–4.8)
LYMPHOCYTES NFR BLD AUTO: 46.5 % (ref 19.6–45.3)
MCH RBC QN AUTO: 30.5 PG (ref 27–32.7)
MCHC RBC AUTO-ENTMCNC: 33.3 G/DL (ref 32.6–36.4)
MCV RBC AUTO: 91.6 FL (ref 79.8–96.2)
MONOCYTES # BLD AUTO: 0.24 10*3/MM3 (ref 0.2–1.2)
MONOCYTES NFR BLD AUTO: 4.2 % (ref 5–12)
NEUTROPHILS # BLD AUTO: 2.6 10*3/MM3 (ref 1.9–8.1)
NEUTROPHILS NFR BLD AUTO: 44.9 % (ref 42.7–76)
NRBC BLD AUTO-RTO: 0 /100 WBC (ref 0–0)
PLATELET # BLD AUTO: 193 10*3/MM3 (ref 140–500)
POTASSIUM SERPL-SCNC: 4.2 MMOL/L (ref 3.5–5.2)
PROT SERPL-MCNC: 7.1 G/DL (ref 6–8.5)
RBC # BLD AUTO: 4.86 10*6/MM3 (ref 4.6–6)
SODIUM SERPL-SCNC: 143 MMOL/L (ref 136–145)
T4 FREE SERPL-MCNC: 0.93 NG/DL (ref 0.93–1.7)
TRIGL SERPL-MCNC: 174 MG/DL (ref 0–150)
TSH SERPL DL<=0.005 MIU/L-ACNC: 6.54 MIU/ML (ref 0.27–4.2)
VLDLC SERPL CALC-MCNC: 34.8 MG/DL (ref 5–40)
WBC # BLD AUTO: 5.78 10*3/MM3 (ref 4.5–10.7)

## 2018-06-08 ENCOUNTER — OFFICE VISIT (OUTPATIENT)
Dept: FAMILY MEDICINE CLINIC | Facility: CLINIC | Age: 68
End: 2018-06-08

## 2018-06-08 VITALS
SYSTOLIC BLOOD PRESSURE: 130 MMHG | OXYGEN SATURATION: 98 % | DIASTOLIC BLOOD PRESSURE: 80 MMHG | BODY MASS INDEX: 34.88 KG/M2 | HEIGHT: 70 IN | HEART RATE: 86 BPM | TEMPERATURE: 97.9 F | WEIGHT: 243.6 LBS

## 2018-06-08 DIAGNOSIS — E11.9 TYPE 2 DIABETES MELLITUS WITHOUT COMPLICATION, WITHOUT LONG-TERM CURRENT USE OF INSULIN (HCC): ICD-10-CM

## 2018-06-08 DIAGNOSIS — I10 ESSENTIAL HYPERTENSION: Primary | ICD-10-CM

## 2018-06-08 DIAGNOSIS — E07.9 DISEASE OF THYROID GLAND: ICD-10-CM

## 2018-06-08 PROBLEM — E78.5 HYPERLIPIDEMIA: Status: ACTIVE | Noted: 2018-06-08

## 2018-06-08 PROCEDURE — 99214 OFFICE O/P EST MOD 30 MIN: CPT | Performed by: INTERNAL MEDICINE

## 2018-06-08 RX ORDER — LEVOTHYROXINE SODIUM 175 UG/1
175 TABLET ORAL DAILY
Qty: 90 TABLET | Refills: 3 | Status: SHIPPED | OUTPATIENT
Start: 2018-06-08 | End: 2019-01-31 | Stop reason: SDUPTHER

## 2018-06-08 NOTE — PROGRESS NOTES
Subjective   Marco Antonio Kendrick is a 67 y.o. male. Patient is here today for follow-up on his hypertension, hyperlipidemia, diabetes mellitus type 2 and elevated TSH.  Patient's generally been feeling well and has no new acute complaints.  He's had no chest pain, shortness of breath, edema or myalgias.  Chief Complaint   Patient presents with   • Hyperlipidemia     HYPERGLYCEMIA, THYROID- FOLLOW UP LABS          Vitals:    06/08/18 0943   BP: 130/80   Pulse: 86   Temp: 97.9 °F (36.6 °C)   SpO2: 98%     The following portions of the patient's history were reviewed and updated as appropriate: allergies, current medications, past family history, past medical history, past social history, past surgical history and problem list.    Past Medical History:   Diagnosis Date   • Arthritis    • Bipolar disorder    • Bladder wall thickening     MILD NON-SPECIFIC URINARY BLADDER....FOLLOWED BY DR COBURN   • Bone marrow suppression     SIGNIFICANT MYELOSUPRESSION FROM FOLFOX-4 CHEMOTHERAPY   • Cancer of sigmoid colon     STAGE III (T3N1M0)   • Depression    • Disease of thyroid gland     HYPOTHYROIDISM   • Diverticula of colon    • Enlarged prostate    • H/O allergy to eggs    • Headache    • Hyperlipidemia    • Hypertension    • Hyperthyroidism    • Obesity    • Pneumonia 11/2015   • PONV (postoperative nausea and vomiting)    • Sick sinus syndrome     S/P LEFT CHEST PACEMAKER IMPLANTATION   • Splenomegaly       Allergies   Allergen Reactions   • Eggs Or Egg-Derived Products Nausea And Vomiting      Social History     Social History   • Marital status:      Spouse name: Carmelita   • Number of children: 2   • Years of education: N/A     Occupational History   •  Retired     Social History Main Topics   • Smoking status: Never Smoker   • Smokeless tobacco: Never Used   • Alcohol use Yes      Comment: occasional   • Drug use: No   • Sexual activity: Defer     Other Topics Concern   • Not on file     Social History Narrative    • No narrative on file        Current Outpatient Prescriptions:   •  aspirin 81 MG chewable tablet, Chew 81 mg daily., Disp: , Rfl:   •  benazepril (LOTENSIN) 10 MG tablet, Take 1 tablet by mouth Daily., Disp: 90 tablet, Rfl: 1  •  cefuroxime (CEFTIN) 250 MG tablet, Take 1 tablet by mouth 2 (Two) Times a Day., Disp: 20 tablet, Rfl: 0  •  Cholecalciferol (VITAMIN D PO), Take  by mouth., Disp: , Rfl:   •  levothyroxine (SYNTHROID, LEVOTHROID) 175 MCG tablet, Take 1 tablet by mouth Daily., Disp: 90 tablet, Rfl: 3  •  lovastatin (MEVACOR) 40 MG tablet, Take 40 mg by mouth Every Night., Disp: , Rfl:   •  metoprolol tartrate (LOPRESSOR) 50 MG tablet, TAKE ONE TABLET BY MOUTH TWICE A DAY, Disp: 180 tablet, Rfl: 1  •  potassium chloride (KLOR-CON) 20 MEQ packet, Take 20 mEq by mouth 2 (two) times a day., Disp: , Rfl:   •  sertraline (ZOLOFT) 100 MG tablet, Take 1.5 tablets by mouth Daily., Disp: 135 tablet, Rfl: 1     Objective     History of Present Illness     Review of Systems   Constitutional: Negative.    HENT: Negative.    Eyes: Negative.    Respiratory: Negative.    Cardiovascular: Negative.    Gastrointestinal: Negative.    Endocrine: Negative.    Genitourinary: Negative.    Musculoskeletal: Negative.    Skin: Negative.    Neurological: Negative.    Psychiatric/Behavioral: Negative.        Physical Exam   Constitutional: He is oriented to person, place, and time. He appears well-developed and well-nourished.   Pleasant, cooperative and in no distress blood pressure 130/80   HENT:   Head: Normocephalic and atraumatic.   Eyes: Conjunctivae are normal. Pupils are equal, round, and reactive to light. No scleral icterus.   Neck: Normal range of motion. Neck supple.   Cardiovascular: Normal rate, regular rhythm and normal heart sounds.    Pulmonary/Chest: Effort normal and breath sounds normal. No respiratory distress. He has no wheezes. He has no rales.   Musculoskeletal: Normal range of motion. He exhibits no edema.    Neurological: He is alert and oriented to person, place, and time.   Skin: Skin is warm and dry.   Psychiatric: He has a normal mood and affect. His behavior is normal.   Nursing note and vitals reviewed.      ASSESSMENT  CBC is normal.  CMP has an elevated but stable sugar 124 and otherwise was fine and hemoglobin A1c is elevated at 6.5.  TSH was slightly high and free T4 was just barely normal at 0.93.  Lipid panel is a bit higher than usual with total cholesterol 194, HDL 43, .  #1-hypertension, adequately controlled on medication  #2-diabetes mellitus type 2, asymptomatic and diet controlled  #3-hypothyroidism, borderline adequate replacement  #4-hyperlipidemia, slightly higher than usual but asymptomatic     Problem List Items Addressed This Visit        Cardiovascular and Mediastinum    BP (high blood pressure) - Primary       Endocrine    Disease of thyroid gland    Relevant Medications    levothyroxine (SYNTHROID, LEVOTHROID) 175 MCG tablet    Type 2 diabetes mellitus without complication          PLAN  I'm increasing the patient's levothyroxine to 175 µg daily and he will continue his other medicines as now.  I recommended shingles and hepatitis A immunizations.  Plan on rechecking him in 3 months with a CMP, NMR lipid panel, hemoglobin A1c, TSH and free T4 and urine microalbumin    There are no Patient Instructions on file for this visit.  Return in about 3 months (around 9/8/2018) for with labs.

## 2018-07-02 ENCOUNTER — CLINICAL SUPPORT NO REQUIREMENTS (OUTPATIENT)
Dept: CARDIOLOGY | Facility: CLINIC | Age: 68
End: 2018-07-02

## 2018-07-02 DIAGNOSIS — I49.5 SICK SINUS SYNDROME (HCC): Primary | ICD-10-CM

## 2018-07-02 PROCEDURE — 93296 REM INTERROG EVL PM/IDS: CPT | Performed by: INTERNAL MEDICINE

## 2018-07-02 PROCEDURE — 93294 REM INTERROG EVL PM/LDLS PM: CPT | Performed by: INTERNAL MEDICINE

## 2018-08-29 RX ORDER — BENAZEPRIL HYDROCHLORIDE 10 MG/1
TABLET ORAL
Qty: 90 TABLET | Refills: 1 | Status: SHIPPED | OUTPATIENT
Start: 2018-08-29 | End: 2019-01-31 | Stop reason: SDUPTHER

## 2018-08-29 RX ORDER — SERTRALINE HYDROCHLORIDE 100 MG/1
TABLET, FILM COATED ORAL
Qty: 135 TABLET | Refills: 1 | Status: SHIPPED | OUTPATIENT
Start: 2018-08-29 | End: 2019-01-31 | Stop reason: SDUPTHER

## 2018-08-30 DIAGNOSIS — E11.9 TYPE 2 DIABETES MELLITUS WITHOUT COMPLICATION, WITHOUT LONG-TERM CURRENT USE OF INSULIN (HCC): ICD-10-CM

## 2018-08-30 DIAGNOSIS — E07.9 DISEASE OF THYROID GLAND: ICD-10-CM

## 2018-08-30 DIAGNOSIS — E78.5 HYPERLIPIDEMIA, UNSPECIFIED HYPERLIPIDEMIA TYPE: ICD-10-CM

## 2018-09-12 LAB
ALBUMIN SERPL-MCNC: 4.3 G/DL (ref 3.5–5.2)
ALBUMIN/GLOB SERPL: 1.5 G/DL
ALP SERPL-CCNC: 58 U/L (ref 39–117)
ALT SERPL-CCNC: 15 U/L (ref 1–41)
AST SERPL-CCNC: 15 U/L (ref 1–40)
BILIRUB SERPL-MCNC: 0.3 MG/DL (ref 0.1–1.2)
BUN SERPL-MCNC: 16 MG/DL (ref 8–23)
BUN/CREAT SERPL: 12.2 (ref 7–25)
CALCIUM SERPL-MCNC: 9 MG/DL (ref 8.6–10.5)
CHLORIDE SERPL-SCNC: 104 MMOL/L (ref 98–107)
CHOLEST SERPL-MCNC: 184 MG/DL (ref 100–199)
CO2 SERPL-SCNC: 27.4 MMOL/L (ref 22–29)
CREAT SERPL-MCNC: 1.31 MG/DL (ref 0.76–1.27)
GLOBULIN SER CALC-MCNC: 2.9 GM/DL
GLUCOSE SERPL-MCNC: 118 MG/DL (ref 65–99)
HBA1C MFR BLD: 6.5 % (ref 4.8–5.6)
HDL SERPL-SCNC: ABNORMAL UMOL/L
HDLC SERPL-MCNC: 36 MG/DL
LDL SERPL-SCNC: ABNORMAL NMOL/L
LDLC SERPL CALC-MCNC: 119 MG/DL (ref 0–99)
MICROALBUMIN UR-MCNC: 19.7 UG/ML
POTASSIUM SERPL-SCNC: 4.2 MMOL/L (ref 3.5–5.2)
PROT SERPL-MCNC: 7.2 G/DL (ref 6–8.5)
SODIUM SERPL-SCNC: 143 MMOL/L (ref 136–145)
T4 FREE SERPL-MCNC: 1.22 NG/DL (ref 0.93–1.7)
TRIGL SERPL-MCNC: 144 MG/DL (ref 0–149)
TSH SERPL DL<=0.005 MIU/L-ACNC: 1.44 MIU/ML (ref 0.27–4.2)

## 2018-09-14 ENCOUNTER — OFFICE VISIT (OUTPATIENT)
Dept: FAMILY MEDICINE CLINIC | Facility: CLINIC | Age: 68
End: 2018-09-14

## 2018-09-14 VITALS
HEIGHT: 70 IN | WEIGHT: 242.8 LBS | BODY MASS INDEX: 34.76 KG/M2 | OXYGEN SATURATION: 98 % | HEART RATE: 84 BPM | TEMPERATURE: 97.7 F | DIASTOLIC BLOOD PRESSURE: 86 MMHG | SYSTOLIC BLOOD PRESSURE: 144 MMHG

## 2018-09-14 DIAGNOSIS — E07.9 DISEASE OF THYROID GLAND: ICD-10-CM

## 2018-09-14 DIAGNOSIS — E78.5 HYPERLIPIDEMIA, UNSPECIFIED HYPERLIPIDEMIA TYPE: ICD-10-CM

## 2018-09-14 DIAGNOSIS — E11.9 TYPE 2 DIABETES MELLITUS WITHOUT COMPLICATION, WITHOUT LONG-TERM CURRENT USE OF INSULIN (HCC): ICD-10-CM

## 2018-09-14 DIAGNOSIS — I10 ESSENTIAL HYPERTENSION: Primary | ICD-10-CM

## 2018-09-14 PROCEDURE — 99214 OFFICE O/P EST MOD 30 MIN: CPT | Performed by: INTERNAL MEDICINE

## 2018-09-14 RX ORDER — ROSUVASTATIN CALCIUM 10 MG/1
10 TABLET, COATED ORAL DAILY
Qty: 30 TABLET | Refills: 3 | Status: SHIPPED | OUTPATIENT
Start: 2018-09-14 | End: 2019-01-31 | Stop reason: SDUPTHER

## 2018-09-14 RX ORDER — LEVOTHYROXINE SODIUM 0.15 MG/1
TABLET ORAL
COMMUNITY
Start: 2018-07-13 | End: 2018-09-14 | Stop reason: DRUGHIGH

## 2018-09-14 NOTE — PROGRESS NOTES
Subjective   Marco Antonio Kendrick is a 67 y.o. male. Patient is here today for follow-up on his hypertension, hyperlipidemia, diabetes mellitus type 2 and hypothyroidism.  He is generally stable and is had no chest pain, shortness of breath, edema or myalgias.   Chief Complaint   Patient presents with   • Hyperlipidemia     lab follow up           Vitals:    09/14/18 0936   BP: 144/86   Pulse: 84   Temp: 97.7 °F (36.5 °C)   SpO2: 98%     The following portions of the patient's history were reviewed and updated as appropriate: allergies, current medications, past family history, past medical history, past social history, past surgical history and problem list.    Past Medical History:   Diagnosis Date   • Arthritis    • Bipolar disorder (CMS/HCC)    • Bladder wall thickening     MILD NON-SPECIFIC URINARY BLADDER....FOLLOWED BY DR COBURN   • Bone marrow suppression     SIGNIFICANT MYELOSUPRESSION FROM FOLFOX-4 CHEMOTHERAPY   • Cancer of sigmoid colon (CMS/HCC)     STAGE III (T3N1M0)   • Depression    • Disease of thyroid gland     HYPOTHYROIDISM   • Diverticula of colon    • Enlarged prostate    • H/O allergy to eggs    • Headache    • Hyperlipidemia    • Hypertension    • Hyperthyroidism    • Obesity    • Pneumonia 11/2015   • PONV (postoperative nausea and vomiting)    • Sick sinus syndrome (CMS/HCC)     S/P LEFT CHEST PACEMAKER IMPLANTATION   • Splenomegaly       Allergies   Allergen Reactions   • Eggs Or Egg-Derived Products Nausea And Vomiting      Social History     Social History   • Marital status:      Spouse name: Carmelita   • Number of children: 2   • Years of education: N/A     Occupational History   •  Retired     Social History Main Topics   • Smoking status: Never Smoker   • Smokeless tobacco: Never Used   • Alcohol use Yes      Comment: occasional   • Drug use: No   • Sexual activity: Defer     Other Topics Concern   • Not on file     Social History Narrative   • No narrative on file        Current  Outpatient Prescriptions:   •  aspirin 81 MG chewable tablet, Chew 81 mg daily., Disp: , Rfl:   •  benazepril (LOTENSIN) 10 MG tablet, TAKE 1 TABLET EVERY DAY, Disp: 90 tablet, Rfl: 1  •  cefuroxime (CEFTIN) 250 MG tablet, Take 1 tablet by mouth 2 (Two) Times a Day., Disp: 20 tablet, Rfl: 0  •  Cholecalciferol (VITAMIN D PO), Take  by mouth., Disp: , Rfl:   •  levothyroxine (SYNTHROID, LEVOTHROID) 175 MCG tablet, Take 1 tablet by mouth Daily., Disp: 90 tablet, Rfl: 3  •  metoprolol tartrate (LOPRESSOR) 50 MG tablet, TAKE ONE TABLET BY MOUTH TWICE A DAY, Disp: 180 tablet, Rfl: 1  •  potassium chloride (KLOR-CON) 20 MEQ packet, Take 20 mEq by mouth 2 (two) times a day., Disp: , Rfl:   •  sertraline (ZOLOFT) 100 MG tablet, TAKE 1 AND 1/2 TABLETS EVERY DAY, Disp: 135 tablet, Rfl: 1  •  rosuvastatin (CRESTOR) 10 MG tablet, Take 1 tablet by mouth Daily., Disp: 30 tablet, Rfl: 3     Objective     History of Present Illness     Review of Systems   Constitutional: Negative.    HENT: Negative.    Eyes: Negative.    Respiratory: Negative.    Cardiovascular: Negative.    Gastrointestinal: Negative.    Genitourinary: Negative.    Musculoskeletal: Negative.    Skin: Negative.    Neurological: Negative.    Psychiatric/Behavioral: Negative.        Physical Exam   Constitutional: He is oriented to person, place, and time. He appears well-developed and well-nourished.   Pleasant, cooperative and in no distress blood pressure 130/80   HENT:   Head: Normocephalic and atraumatic.   Eyes: Pupils are equal, round, and reactive to light. Conjunctivae are normal. No scleral icterus.   Neck: Normal range of motion. Neck supple.   Cardiovascular: Normal rate, regular rhythm and normal heart sounds.    Pulmonary/Chest: Effort normal and breath sounds normal. No respiratory distress. He has no wheezes. He has no rales.   Musculoskeletal: Normal range of motion. He exhibits no edema.   Neurological: He is alert and oriented to person, place, and  time.   Skin: Skin is warm and dry.   Psychiatric: He has a normal mood and affect. His behavior is normal.   Nursing note and vitals reviewed.      ASSESSMENT  CMP has an elevated but stable sugar 118 and a creatinine of 1.31 and otherwise is normal and hemoglobin A1c is stable at 6.5 and urine microalbumin is acceptable.  His lipid panel is a bit higher than I like with a total cholesterol 184, HDL of 36 with an LDL of 119.  TSH and free T4 are now both normal.  #1-hypertension, reasonably controlled on medication  #2-hyperlipidemia, not optimally controlled  #3-diabetes mellitus type 2, diet controlled  #4-hypothyroidism, now euthyroid on replacement     Problem List Items Addressed This Visit        Cardiovascular and Mediastinum    BP (high blood pressure) - Primary    Hyperlipidemia    Relevant Medications    rosuvastatin (CRESTOR) 10 MG tablet       Endocrine    Disease of thyroid gland    Type 2 diabetes mellitus without complication (CMS/[facility])          PLAN  I recommended the shingles and flu shots.  Patient will continue current medicines as now but I'm going to change him from lovastatin to Crestor 10 mg daily.  I want to recheck him in 4 months with a CMP, lipid panel, hemoglobin A1c and TSH and free T4    There are no Patient Instructions on file for this visit.  Return in about 4 months (around 1/14/2019) for with labs.

## 2018-09-24 RX ORDER — LEVOTHYROXINE SODIUM 0.15 MG/1
TABLET ORAL
Qty: 90 TABLET | Refills: 1 | Status: SHIPPED | OUTPATIENT
Start: 2018-09-24 | End: 2018-10-24

## 2018-10-24 ENCOUNTER — OFFICE VISIT (OUTPATIENT)
Dept: CARDIOLOGY | Facility: CLINIC | Age: 68
End: 2018-10-24

## 2018-10-24 ENCOUNTER — CLINICAL SUPPORT NO REQUIREMENTS (OUTPATIENT)
Dept: CARDIOLOGY | Facility: CLINIC | Age: 68
End: 2018-10-24

## 2018-10-24 VITALS
HEIGHT: 70 IN | SYSTOLIC BLOOD PRESSURE: 136 MMHG | HEART RATE: 62 BPM | OXYGEN SATURATION: 96 % | DIASTOLIC BLOOD PRESSURE: 96 MMHG | BODY MASS INDEX: 33.5 KG/M2 | WEIGHT: 234 LBS

## 2018-10-24 DIAGNOSIS — I49.5 SICK SINUS SYNDROME (HCC): Primary | ICD-10-CM

## 2018-10-24 DIAGNOSIS — Z95.0 HISTORY OF PERMANENT CARDIAC PACEMAKER PLACEMENT: ICD-10-CM

## 2018-10-24 DIAGNOSIS — I10 ESSENTIAL HYPERTENSION: ICD-10-CM

## 2018-10-24 DIAGNOSIS — E78.5 HYPERLIPIDEMIA, UNSPECIFIED HYPERLIPIDEMIA TYPE: ICD-10-CM

## 2018-10-24 DIAGNOSIS — E66.9 OBESITY (BMI 30.0-34.9): ICD-10-CM

## 2018-10-24 PROBLEM — E66.811 OBESITY (BMI 30.0-34.9): Status: ACTIVE | Noted: 2018-10-24

## 2018-10-24 PROCEDURE — 99213 OFFICE O/P EST LOW 20 MIN: CPT | Performed by: NURSE PRACTITIONER

## 2018-10-24 PROCEDURE — 93000 ELECTROCARDIOGRAM COMPLETE: CPT | Performed by: NURSE PRACTITIONER

## 2018-10-24 PROCEDURE — 93280 PM DEVICE PROGR EVAL DUAL: CPT | Performed by: INTERNAL MEDICINE

## 2018-10-24 NOTE — PROGRESS NOTES
Date of Office Visit: 10/24/2018  Encounter Provider: SADAF Holbrook  Place of Service: [facility]  Patient Name: Marco Antonio Kendrick  :1950      Subjective:     Chief Complaint:  Sick Sinus Syndrome    History of Present Illness:  Marco Antonio Kendrick is a pleasant 67 y.o. male who is new to me.  Outside records have been obtained and reviewed by me. The patient has a past medical history of sick sinus syndrome with left chest pacemaker, hypertension, hyperlipidemia, colon cancer, bipolar disorder, thyroid disease, pneumonia and obesity.     The patient was last seen in the office on 3/14/18 by Dr. Bazan. At that time the patient's pacemaker appeared stable and he was not having complaints of chest pain, shortness of breath, dizziness, or orthopnea. He did have some complaints of fatigue. His hypertension was mostly well controlled on medical therapy. Dr. Bazan did not make any changes to his medical regimen and wanted to see him back again in 1 year.   His last remote device check on 18 his pacemaker was functioning appropriately.      He is here today for a follow-up visit.  He has been feeling really well since his last visit.  He denies any chest pain, shortness of breath, swelling of the legs, palpitations, dizziness, lightheadedness, syncope, near-syncope or falls.  He denies any PND or orthopnea. He reports some mild fatigue but nothing that is limiting to him.  He really has no other complaints today.  He states that his wife does say that he snores but he has been tested for sleep apnea by sleep study a few different times most recently within the last year and was not diagnosed with sleep apnea.  He reports that he walks with his wife in the park every other night at least a mile and a half.  He is not having any symptoms or issues with walking.  He reports his weight is down about 15-20 pounds.  His blood pressure is a little elevated today and his  diastolic reading as it was last visit.  He is compliant with his blood pressure medications.  He does not check his blood pressure at home.  He does not following any special type of diet.  He has had some renal insufficiency on his labs his most recent creatinine was 1.31 on his lab work by his PCP Dr. Duenas on 9/7/18.  He had his pacemaker interrogated today, and was functioning appropriately.         Past Medical History:   Diagnosis Date   • Arthritis    • Bipolar disorder (CMS/HCC)    • Bladder wall thickening     MILD NON-SPECIFIC URINARY BLADDER....FOLLOWED BY DR COBURN   • Bone marrow suppression     SIGNIFICANT MYELOSUPRESSION FROM FOLFOX-4 CHEMOTHERAPY   • Cancer of sigmoid colon (CMS/HCC)     STAGE III (T3N1M0)   • Depression    • Disease of thyroid gland     HYPOTHYROIDISM   • Diverticula of colon    • Enlarged prostate    • H/O allergy to eggs    • Headache    • Hyperlipidemia    • Hypertension    • Hyperthyroidism    • Obesity    • Pneumonia 11/2015   • PONV (postoperative nausea and vomiting)    • Sick sinus syndrome (CMS/HCC)     S/P LEFT CHEST PACEMAKER IMPLANTATION   • Splenomegaly      Past Surgical History:   Procedure Laterality Date   • APPENDECTOMY     • COLON SURGERY  2011    RESECTION   • COLONOSCOPY  01/2015    DR. CGAE   • COLONOSCOPY N/A 7/25/2016    Procedure: COLONOSCOPY to cecum and ti;  Surgeon: Leonel Cage MD;  Location: [facility];  Service:    • CYSTOSCOPY      BY DR COBURN   • PACEMAKER IMPLANTATION Left      Outpatient Medications Prior to Visit   Medication Sig Dispense Refill   • aspirin 81 MG chewable tablet Chew 81 mg daily.     • benazepril (LOTENSIN) 10 MG tablet TAKE 1 TABLET EVERY DAY 90 tablet 1   • cefuroxime (CEFTIN) 250 MG tablet Take 1 tablet by mouth 2 (Two) Times a Day. 20 tablet 0   • Cholecalciferol (VITAMIN D PO) Take  by mouth.     • levothyroxine (SYNTHROID, LEVOTHROID) 175 MCG tablet Take 1 tablet by mouth Daily. 90 tablet 3   •  metoprolol tartrate (LOPRESSOR) 50 MG tablet TAKE ONE TABLET BY MOUTH TWICE A  tablet 1   • potassium chloride (KLOR-CON) 20 MEQ packet Take 20 mEq by mouth 2 (two) times a day.     • rosuvastatin (CRESTOR) 10 MG tablet Take 1 tablet by mouth Daily. 30 tablet 3   • sertraline (ZOLOFT) 100 MG tablet TAKE 1 AND 1/2 TABLETS EVERY  tablet 1   • levothyroxine (SYNTHROID, LEVOTHROID) 150 MCG tablet TAKE 1 TABLET EVERY DAY 90 tablet 1     No facility-administered medications prior to visit.        Allergies as of 10/24/2018 - Reviewed 10/24/2018   Allergen Reaction Noted   • Eggs or egg-derived products Nausea And Vomiting 03/03/2016     Social History     Social History   • Marital status:      Spouse name: Carmelita   • Number of children: 2   • Years of education: N/A     Occupational History   •  Retired     Social History Main Topics   • Smoking status: Never Smoker   • Smokeless tobacco: Never Used   • Alcohol use Yes      Comment: occasional   • Drug use: No   • Sexual activity: Defer     Other Topics Concern   • Not on file     Social History Narrative   • No narrative on file     Family History   Problem Relation Age of Onset   • Colon cancer Father    • Melanoma Brother    • Heart disease Other    • Hypertension Other    • Diabetes Other    • Cancer Other    • Stroke Other      Review of Systems   Constitution: Positive for malaise/fatigue. Negative for chills and fever.   HENT: Negative for ear pain, hearing loss, nosebleeds and sore throat.    Eyes: Negative for double vision, pain, vision loss in left eye and vision loss in right eye.   Cardiovascular: Negative for chest pain, claudication, dyspnea on exertion, irregular heartbeat, leg swelling, near-syncope, orthopnea, palpitations, paroxysmal nocturnal dyspnea and syncope.   Respiratory: Negative for cough, shortness of breath, snoring and wheezing.    Endocrine: Negative for cold intolerance and heat intolerance.   Hematologic/Lymphatic:  "Negative for bleeding problem.   Skin: Negative for color change, itching, rash and unusual hair distribution.   Musculoskeletal: Negative for joint pain and joint swelling.   Gastrointestinal: Negative for abdominal pain, diarrhea, hematochezia, melena, nausea and vomiting.   Genitourinary: Negative for decreased libido, frequency, hematuria, hesitancy and incomplete emptying.   Neurological: Negative for excessive daytime sleepiness, dizziness, headaches, light-headedness, loss of balance, numbness, paresthesias and seizures.   Psychiatric/Behavioral: Negative for depression.          Objective:     Vitals:    10/24/18 1416   BP: 136/96   BP Location: Left arm   Patient Position: Sitting   Pulse: 62   SpO2: 96%   Weight: 106 kg (234 lb)   Height: 177.8 cm (70\")     Body mass index is 33.58 kg/m².    PHYSICAL EXAM:  Physical Exam   Constitutional: He is oriented to person, place, and time. He appears well-developed and well-nourished. No distress.   HENT:   Head: Normocephalic and atraumatic.   Eyes: Pupils are equal, round, and reactive to light.   Neck: Neck supple. No JVD present. Carotid bruit is not present.   Cardiovascular: Normal rate, regular rhythm, normal heart sounds and intact distal pulses.    No murmur heard.  Pulses:       Radial pulses are 2+ on the right side, and 2+ on the left side.        Dorsalis pedis pulses are 2+ on the right side, and 2+ on the left side.        Posterior tibial pulses are 2+ on the right side, and 2+ on the left side.   Pulmonary/Chest: Effort normal and breath sounds normal. No accessory muscle usage. No respiratory distress. He has no decreased breath sounds. He has no wheezes. He has no rhonchi. He has no rales. He exhibits no tenderness.   Left chest permanent pacemaker presence. No s/s of infection.   Abdominal: Soft. Bowel sounds are normal. He exhibits no distension. There is no splenomegaly or hepatomegaly. There is no tenderness. There is no rebound.   Obese "   Musculoskeletal: Normal range of motion. He exhibits no edema.   Neurological: He is alert and oriented to person, place, and time.   Skin: Skin is warm, dry and intact. He is not diaphoretic. No erythema.   Psychiatric: He has a normal mood and affect. His speech is normal and behavior is normal. Judgment and thought content normal. Cognition and memory are normal.   Nursing note and vitals reviewed.        ECG 12 Lead  Date/Time: 10/24/2018 2:20 PM  Performed by: ELSIE BARKER  Authorized by: ELSIE BARKER   Comparison: compared with previous ECG from 5/20/2014  Similar to previous ECG  Comparison to previous ECG: Reviewed ECGs with Dr. Bailey  Rhythm: sinus rhythm  Rate: normal  BPM: 62  QRS axis: left  Other findings: LVH  Q waves: I and aVL  Clinical impression: abnormal ECG  Comments: Non-specific ST-T wave abnormalities  IVCD  Indication: Sick Sinus Syndrome            Assessment:       Diagnosis Plan   1. Sick sinus syndrome (CMS/HCC)     2. History of permanent cardiac pacemaker placement     3. Essential hypertension     4. Hyperlipidemia, unspecified hyperlipidemia type     5. Obesity (BMI 30.0-34.9)         Plan:     1. Sick sinus syndrome: s/p permament pacemaker implantation. Stable at this time. Left chest PPM site w/o s/s of infection.   2. Hypertension: On Benazepril 10 mg daily, Metoprolol tartrate  50 mg BID.  His diastolic blood pressure reading was a little high today.  I advised a minimizing sodium intake.  He is going to start monitoring his blood pressure at home over the next month he was going to call me if he has consistent blood pressures greater than 140/90.  We are going to touch base in one month to see if he needs medication titration.   3. Hyperlipidemia: LDL was a little elevated at 116 on 6/1/18.  If this stays persistently elevated on his next repeat labs by his PCP, he will likely need medication titration.On Crestor 10 mg daily. Managed by PCP.   4. Obesity: We discussed  healthy diet and weight loss.  He is going to continue to walk with his wife and tried to increase his activity a little bit.        I advised on the importance of good blood pressure, blood sugar and cholesterol control, as well as avoidance of tobacco and regular exercise and weight loss for cardiovascular disease risk factor modification.         Follow up with Dr. Bazan in 6 months, unless otherwise needed sooner.  I advised the patient to contact our office with any questions or concerns.         Your medication list          Accurate as of 10/24/18 11:59 PM. If you have any questions, ask your nurse or doctor.               CHANGE how you take these medications      Instructions Last Dose Given Next Dose Due   levothyroxine 175 MCG tablet  Commonly known as:  SYNTHROID, LEVOTHROID  What changed:  Another medication with the same name was removed. Continue taking this medication, and follow the directions you see here.  Changed by:  SADAF Holbrook      Take 1 tablet by mouth Daily.          CONTINUE taking these medications      Instructions Last Dose Given Next Dose Due   aspirin 81 MG chewable tablet      Chew 81 mg daily.       benazepril 10 MG tablet  Commonly known as:  LOTENSIN      TAKE 1 TABLET EVERY DAY       cefuroxime 250 MG tablet  Commonly known as:  CEFTIN      Take 1 tablet by mouth 2 (Two) Times a Day.       metoprolol tartrate 50 MG tablet  Commonly known as:  LOPRESSOR      TAKE ONE TABLET BY MOUTH TWICE A DAY       potassium chloride 20 MEQ packet  Commonly known as:  KLOR-CON      Take 20 mEq by mouth 2 (two) times a day.       rosuvastatin 10 MG tablet  Commonly known as:  CRESTOR      Take 1 tablet by mouth Daily.       sertraline 100 MG tablet  Commonly known as:  ZOLOFT      TAKE 1 AND 1/2 TABLETS EVERY DAY       VITAMIN D PO      Take  by mouth.              The above medication changes may not have been made by this provider.  Medication list was updated to reflect medications  patient is currently taking including medication changes and discontinuations made by other healthcare providers.     It has been a pleasure to participate in this patient's care. Please feel free to contact me with any questions or concerns.     Emerita Ramirez, APRN  10/24/2018       Dictated utilizing Dragon Dictation System.

## 2018-11-21 ENCOUNTER — TELEPHONE (OUTPATIENT)
Dept: CARDIOLOGY | Facility: CLINIC | Age: 68
End: 2018-11-21

## 2018-11-21 NOTE — TELEPHONE ENCOUNTER
I called and spoke with the patient's wife regarding his blood pressures recently.  He has been checking it at [facility] and it has been ranging from 100s-150/48-95.  They will bring in his b/p log to his upcoming b/p check. He has been feeling well except for the last few days he has had symptoms of an upper respiratory tract infection.  I advised his wife to purchase a blood pressure cuff that goes on the arm instead of going to [facility].  They will do this and bring it to the blood pressure check on Monday 11/26/18 to be verified.  I will anticipate probably starting 5 mg of amlodipine at that visit.  I also notified him that he would likely be getting a call from the office to switch his appointment to Dr. Bazan schedule as she somehow got put on my schedule and April 2019 and this was an oversight.  They demonstrated understanding.

## 2018-11-26 ENCOUNTER — CLINICAL SUPPORT (OUTPATIENT)
Dept: CARDIOLOGY | Facility: CLINIC | Age: 68
End: 2018-11-26

## 2018-11-26 DIAGNOSIS — I10 ESSENTIAL HYPERTENSION: Primary | ICD-10-CM

## 2018-11-26 RX ORDER — AMLODIPINE BESYLATE 5 MG/1
5 TABLET ORAL DAILY
COMMUNITY
End: 2018-11-26 | Stop reason: SDUPTHER

## 2018-11-26 RX ORDER — AMLODIPINE BESYLATE 5 MG/1
5 TABLET ORAL DAILY
Qty: 30 TABLET | Refills: 3 | Status: SHIPPED | OUTPATIENT
Start: 2018-11-26 | End: 2019-01-31 | Stop reason: SDUPTHER

## 2018-11-26 NOTE — PROGRESS NOTES
Pt present today for BP checkup for Hypertension. Pt BP today Pt cuff Rt arm 141/103 Hr 86 LFT: 156/102 HR 86 LCG cuff RT: 136/90 LFt: 140/90. Per SADAF Velazquez starting pt on Amlodipine 5 mg daily, return in 2 weeks for BP check. Thanks, Vicente    I have reviewed the notes, assessments, and/or procedures performed by ANA Zhang, I concur with her  documentation of Marco Antonio Kendrick.

## 2018-12-13 ENCOUNTER — CLINICAL SUPPORT (OUTPATIENT)
Dept: CARDIOLOGY | Facility: CLINIC | Age: 68
End: 2018-12-13

## 2018-12-13 DIAGNOSIS — I10 ESSENTIAL HYPERTENSION: Primary | ICD-10-CM

## 2018-12-13 NOTE — PROGRESS NOTES
Patient ;here for  BP Check  Patient taking Amlodipine 5mg once daily  Lotensin 10 mg once daily  Metoprolol tart 50mg 1 twice daily      BP reading at home  12/4/18 164/105 pulse 68  12/5/18 148/93 pulse 90  12/6/18 142/92 pulse 91  12/7/18 124/82 pulse 65  12/8/18 142/88 pulse 75  12/9/18 148/91 pulse 83  12/19/18 155/100 pulse 84  12/11/18 140/90 pulse 78  12/12/18 122/81 pulse 81        BP today 128/92   72    Per Emerita patient to increase amlodipine to 10mg once daily  If BP stay consistently  140/90 patient to call us. Keep appt with Dr. Bazan

## 2018-12-13 NOTE — PROGRESS NOTES
I have reviewed the notes, assessments, and/or procedures performed by MI Crowe MA, I concur with her documentation of Marco Antonio Kendrick.

## 2018-12-26 RX ORDER — METOPROLOL TARTRATE 50 MG/1
TABLET, FILM COATED ORAL
Qty: 180 TABLET | Refills: 1 | Status: SHIPPED | OUTPATIENT
Start: 2018-12-26 | End: 2019-09-09 | Stop reason: SDUPTHER

## 2019-01-03 ENCOUNTER — OFFICE VISIT (OUTPATIENT)
Dept: FAMILY MEDICINE CLINIC | Facility: CLINIC | Age: 69
End: 2019-01-03

## 2019-01-03 VITALS
HEART RATE: 85 BPM | SYSTOLIC BLOOD PRESSURE: 122 MMHG | OXYGEN SATURATION: 98 % | DIASTOLIC BLOOD PRESSURE: 80 MMHG | BODY MASS INDEX: 33.5 KG/M2 | HEIGHT: 70 IN | WEIGHT: 234 LBS | RESPIRATION RATE: 16 BRPM | TEMPERATURE: 98.9 F

## 2019-01-03 DIAGNOSIS — J06.9 ACUTE URI: Primary | ICD-10-CM

## 2019-01-03 DIAGNOSIS — J01.40 ACUTE NON-RECURRENT PANSINUSITIS: ICD-10-CM

## 2019-01-03 DIAGNOSIS — J40 BRONCHITIS: ICD-10-CM

## 2019-01-03 PROCEDURE — 99213 OFFICE O/P EST LOW 20 MIN: CPT | Performed by: INTERNAL MEDICINE

## 2019-01-03 RX ORDER — AZITHROMYCIN 250 MG/1
TABLET, FILM COATED ORAL
Qty: 6 TABLET | Refills: 0 | Status: SHIPPED | OUTPATIENT
Start: 2019-01-03 | End: 2019-01-31

## 2019-01-03 NOTE — PROGRESS NOTES
Subjective   Marco Antonio Kendrick is a 68 y.o. male. Patient is here today for an upper respiratory infection it's been going on for a couple of weeks.  He is mainly having some sinus drainage that's a little cloudy but not a lot of pain, and some cough that's slightly productive at times.  He's had no fevers chills pleurisy, respiratory distress or significant myalgias.  He says he gets this usually once a year.  Chief Complaint   Patient presents with   • URI          Vitals:    01/03/19 1410   BP: 122/80   Pulse: 85   Resp: 16   Temp: 98.9 °F (37.2 °C)   SpO2: 98%     The following portions of the patient's history were reviewed and updated as appropriate: allergies, current medications, past family history, past medical history, past social history, past surgical history and problem list.    Past Medical History:   Diagnosis Date   • Arthritis    • Bipolar disorder (CMS/HCC)    • Bladder wall thickening     MILD NON-SPECIFIC URINARY BLADDER....FOLLOWED BY DR COBURN   • Bone marrow suppression     SIGNIFICANT MYELOSUPRESSION FROM FOLFOX-4 CHEMOTHERAPY   • Cancer of sigmoid colon (CMS/HCC)     STAGE III (T3N1M0)   • Depression    • Disease of thyroid gland     HYPOTHYROIDISM   • Diverticula of colon    • Enlarged prostate    • H/O allergy to eggs    • Headache    • Hyperlipidemia    • Hypertension    • Hyperthyroidism    • Obesity    • Pneumonia 11/2015   • PONV (postoperative nausea and vomiting)    • Sick sinus syndrome (CMS/HCC)     S/P LEFT CHEST PACEMAKER IMPLANTATION   • Splenomegaly       Allergies   Allergen Reactions   • Eggs Or Egg-Derived Products Nausea And Vomiting      Social History     Socioeconomic History   • Marital status:      Spouse name: Carmelita   • Number of children: 2   • Years of education: Not on file   • Highest education level: Not on file   Social Needs   • Financial resource strain: Not on file   • Food insecurity - worry: Not on file   • Food insecurity - inability: Not on file    • Transportation needs - medical: Not on file   • Transportation needs - non-medical: Not on file   Occupational History     Employer: RETIRED   Tobacco Use   • Smoking status: Never Smoker   • Smokeless tobacco: Never Used   Substance and Sexual Activity   • Alcohol use: Yes     Comment: occasional   • Drug use: No   • Sexual activity: Defer   Other Topics Concern   • Not on file   Social History Narrative   • Not on file        Current Outpatient Medications:   •  amLODIPine (NORVASC) 5 MG tablet, Take 1 tablet by mouth Daily., Disp: 30 tablet, Rfl: 3  •  aspirin 81 MG chewable tablet, Chew 81 mg daily., Disp: , Rfl:   •  azithromycin (ZITHROMAX Z-OLESYA) 250 MG tablet, Take 2 tablets the first day, then 1 tablet daily for 4 days., Disp: 6 tablet, Rfl: 0  •  benazepril (LOTENSIN) 10 MG tablet, TAKE 1 TABLET EVERY DAY, Disp: 90 tablet, Rfl: 1  •  Cholecalciferol (VITAMIN D PO), Take  by mouth., Disp: , Rfl:   •  levothyroxine (SYNTHROID, LEVOTHROID) 175 MCG tablet, Take 1 tablet by mouth Daily., Disp: 90 tablet, Rfl: 3  •  metoprolol tartrate (LOPRESSOR) 50 MG tablet, TAKE ONE TABLET BY MOUTH TWICE A DAY, Disp: 180 tablet, Rfl: 1  •  potassium chloride (KLOR-CON) 20 MEQ packet, Take 20 mEq by mouth 2 (two) times a day., Disp: , Rfl:   •  rosuvastatin (CRESTOR) 10 MG tablet, Take 1 tablet by mouth Daily., Disp: 30 tablet, Rfl: 3  •  sertraline (ZOLOFT) 100 MG tablet, TAKE 1 AND 1/2 TABLETS EVERY DAY, Disp: 135 tablet, Rfl: 1     Objective     History of Present Illness     Review of Systems   Constitutional: Negative for chills and fever.   HENT: Positive for congestion, postnasal drip and rhinorrhea. Negative for sinus pressure, sinus pain and sore throat.    Eyes: Negative.    Respiratory: Positive for cough. Negative for shortness of breath.    Cardiovascular: Negative.    Gastrointestinal: Negative.    Genitourinary: Negative.    Musculoskeletal: Negative.    Skin: Negative.        Physical Exam   Constitutional:  He appears well-developed and well-nourished.   Pleasant, cooperative no distress with an occasional cough that sounds loose   HENT:   Head: Normocephalic and atraumatic.   Nose: Nose normal. Right sinus exhibits no maxillary sinus tenderness and no frontal sinus tenderness. Left sinus exhibits no maxillary sinus tenderness and no frontal sinus tenderness.   Mouth/Throat: Uvula is midline, oropharynx is clear and moist and mucous membranes are normal. No tonsillar exudate.   Eyes: Conjunctivae are normal. Pupils are equal, round, and reactive to light.   Neck: Normal range of motion. Neck supple.   Cardiovascular: Normal rate, regular rhythm and normal heart sounds.   Pulmonary/Chest: Effort normal and breath sounds normal. No respiratory distress. He has no wheezes. He has no rales.   Lymphadenopathy:     He has no cervical adenopathy.   Neurological: He is alert.   Skin: Skin is warm and dry.   Psychiatric: He has a normal mood and affect.   Nursing note and vitals reviewed.      ASSESSMENT  the patient has an upper respiratory infection that is been going on for a couple of weeks with some sinus drainage and some bronchial symptoms.  There is nothing to suggest a pneumonia.     Problem List Items Addressed This Visit     None      Visit Diagnoses     Acute URI    -  Primary    Relevant Medications    azithromycin (ZITHROMAX Z-OLESYA) 250 MG tablet    Bronchitis        Acute non-recurrent pansinusitis              PLAN  I'm going to add in a Z-Olesya and he can continue with over-the-counter cough medicine, Claritin and can add in Flonase or Nasacort if he wishes.    There are no Patient Instructions on file for this visit.  No Follow-up on file.

## 2019-01-17 DIAGNOSIS — E11.9 TYPE 2 DIABETES MELLITUS WITHOUT COMPLICATION, WITHOUT LONG-TERM CURRENT USE OF INSULIN (HCC): ICD-10-CM

## 2019-01-17 DIAGNOSIS — I10 ESSENTIAL HYPERTENSION: ICD-10-CM

## 2019-01-17 DIAGNOSIS — E03.9 HYPOTHYROIDISM, UNSPECIFIED TYPE: ICD-10-CM

## 2019-01-17 DIAGNOSIS — E78.5 HYPERLIPIDEMIA, UNSPECIFIED HYPERLIPIDEMIA TYPE: Primary | ICD-10-CM

## 2019-01-19 LAB
ALBUMIN SERPL-MCNC: 3.8 G/DL (ref 3.5–5.2)
ALBUMIN/GLOB SERPL: 1.1 G/DL
ALP SERPL-CCNC: 56 U/L (ref 39–117)
ALT SERPL-CCNC: 17 U/L (ref 1–41)
AST SERPL-CCNC: 15 U/L (ref 1–40)
BILIRUB SERPL-MCNC: 0.3 MG/DL (ref 0.1–1.2)
BUN SERPL-MCNC: 15 MG/DL (ref 8–23)
BUN/CREAT SERPL: 12.5 (ref 7–25)
CALCIUM SERPL-MCNC: 9.1 MG/DL (ref 8.6–10.5)
CHLORIDE SERPL-SCNC: 103 MMOL/L (ref 98–107)
CHOLEST SERPL-MCNC: 157 MG/DL (ref 0–200)
CO2 SERPL-SCNC: 24.9 MMOL/L (ref 22–29)
CREAT SERPL-MCNC: 1.2 MG/DL (ref 0.76–1.27)
GLOBULIN SER CALC-MCNC: 3.5 GM/DL
GLUCOSE SERPL-MCNC: 131 MG/DL (ref 65–99)
HBA1C MFR BLD: 6.89 % (ref 4.8–5.6)
HDLC SERPL-MCNC: 40 MG/DL (ref 40–60)
LDLC SERPL CALC-MCNC: 60 MG/DL (ref 0–100)
LDLC/HDLC SERPL: 1.49 {RATIO}
POTASSIUM SERPL-SCNC: 3.6 MMOL/L (ref 3.5–5.2)
PROT SERPL-MCNC: 7.3 G/DL (ref 6–8.5)
SODIUM SERPL-SCNC: 141 MMOL/L (ref 136–145)
T4 FREE SERPL-MCNC: 1.21 NG/DL (ref 0.93–1.7)
TRIGL SERPL-MCNC: 287 MG/DL (ref 0–150)
TSH SERPL DL<=0.005 MIU/L-ACNC: 2.24 MIU/ML (ref 0.27–4.2)
VLDLC SERPL CALC-MCNC: 57.4 MG/DL (ref 5–40)

## 2019-01-31 ENCOUNTER — OFFICE VISIT (OUTPATIENT)
Dept: FAMILY MEDICINE CLINIC | Facility: CLINIC | Age: 69
End: 2019-01-31

## 2019-01-31 VITALS
BODY MASS INDEX: 32.93 KG/M2 | SYSTOLIC BLOOD PRESSURE: 134 MMHG | RESPIRATION RATE: 16 BRPM | DIASTOLIC BLOOD PRESSURE: 84 MMHG | OXYGEN SATURATION: 98 % | TEMPERATURE: 98.4 F | HEIGHT: 70 IN | WEIGHT: 230 LBS | HEART RATE: 97 BPM

## 2019-01-31 DIAGNOSIS — E07.9 DISEASE OF THYROID GLAND: ICD-10-CM

## 2019-01-31 DIAGNOSIS — E66.9 OBESITY (BMI 30.0-34.9): ICD-10-CM

## 2019-01-31 DIAGNOSIS — Z23 NEED FOR VACCINATION: ICD-10-CM

## 2019-01-31 DIAGNOSIS — I10 ESSENTIAL HYPERTENSION: Primary | ICD-10-CM

## 2019-01-31 DIAGNOSIS — E11.9 TYPE 2 DIABETES MELLITUS WITHOUT COMPLICATION, WITHOUT LONG-TERM CURRENT USE OF INSULIN (HCC): ICD-10-CM

## 2019-01-31 DIAGNOSIS — E78.5 HYPERLIPIDEMIA, UNSPECIFIED HYPERLIPIDEMIA TYPE: ICD-10-CM

## 2019-01-31 PROCEDURE — 90662 IIV NO PRSV INCREASED AG IM: CPT | Performed by: INTERNAL MEDICINE

## 2019-01-31 PROCEDURE — 99214 OFFICE O/P EST MOD 30 MIN: CPT | Performed by: INTERNAL MEDICINE

## 2019-01-31 PROCEDURE — G0008 ADMIN INFLUENZA VIRUS VAC: HCPCS | Performed by: INTERNAL MEDICINE

## 2019-01-31 RX ORDER — BENAZEPRIL HYDROCHLORIDE 10 MG/1
10 TABLET ORAL DAILY
Qty: 90 TABLET | Refills: 3 | Status: SHIPPED | OUTPATIENT
Start: 2019-01-31 | End: 2020-02-28 | Stop reason: SDUPTHER

## 2019-01-31 RX ORDER — ROSUVASTATIN CALCIUM 10 MG/1
10 TABLET, COATED ORAL DAILY
Qty: 90 TABLET | Refills: 3 | Status: SHIPPED | OUTPATIENT
Start: 2019-01-31 | End: 2020-02-28 | Stop reason: SDUPTHER

## 2019-01-31 RX ORDER — POTASSIUM CHLORIDE 1.5 G/1.77G
20 POWDER, FOR SOLUTION ORAL 2 TIMES DAILY
Qty: 180 PACKET | Refills: 3 | Status: SHIPPED | OUTPATIENT
Start: 2019-01-31 | End: 2021-07-02

## 2019-01-31 RX ORDER — AMLODIPINE BESYLATE 5 MG/1
5 TABLET ORAL DAILY
Qty: 90 TABLET | Refills: 3 | Status: SHIPPED | OUTPATIENT
Start: 2019-01-31 | End: 2019-04-25

## 2019-01-31 RX ORDER — LEVOTHYROXINE SODIUM 175 UG/1
175 TABLET ORAL DAILY
Qty: 90 TABLET | Refills: 3 | Status: SHIPPED | OUTPATIENT
Start: 2019-01-31 | End: 2020-02-28 | Stop reason: SDUPTHER

## 2019-01-31 RX ORDER — SERTRALINE HYDROCHLORIDE 100 MG/1
150 TABLET, FILM COATED ORAL DAILY
Qty: 135 TABLET | Refills: 3 | Status: SHIPPED | OUTPATIENT
Start: 2019-01-31 | End: 2020-02-28 | Stop reason: SDUPTHER

## 2019-01-31 RX ORDER — LEVOTHYROXINE SODIUM 175 UG/1
175 TABLET ORAL DAILY
Qty: 90 TABLET | Refills: 3 | Status: SHIPPED | OUTPATIENT
Start: 2019-01-31 | End: 2019-01-31 | Stop reason: SDUPTHER

## 2019-01-31 NOTE — PROGRESS NOTES
Subjective   Marco Antonio Kendrick is a 68 y.o. male. Patient is here today for follow-up on his hypertension, hyperlipidemia, diabetes mellitus type 2 and hypothyroidism.  Chief Complaint   Patient presents with   • Hypertension   • Hypothyroidism   • Hyperlipidemia          Vitals:    01/31/19 1348   BP: 134/84   Pulse: 97   Resp: 16   Temp: 98.4 °F (36.9 °C)   SpO2: 98%     The following portions of the patient's history were reviewed and updated as appropriate: allergies, current medications, past family history, past medical history, past social history, past surgical history and problem list.    Past Medical History:   Diagnosis Date   • Arthritis    • Bipolar disorder (CMS/HCC)    • Bladder wall thickening     MILD NON-SPECIFIC URINARY BLADDER....FOLLOWED BY DR COBURN   • Bone marrow suppression     SIGNIFICANT MYELOSUPRESSION FROM FOLFOX-4 CHEMOTHERAPY   • Cancer of sigmoid colon (CMS/HCC)     STAGE III (T3N1M0)   • Depression    • Disease of thyroid gland     HYPOTHYROIDISM   • Diverticula of colon    • Enlarged prostate    • H/O allergy to eggs    • Headache    • Hyperlipidemia    • Hypertension    • Hyperthyroidism    • Obesity    • Pneumonia 11/2015   • PONV (postoperative nausea and vomiting)    • Sick sinus syndrome (CMS/HCC)     S/P LEFT CHEST PACEMAKER IMPLANTATION   • Splenomegaly       Allergies   Allergen Reactions   • Eggs Or Egg-Derived Products Nausea And Vomiting      Social History     Socioeconomic History   • Marital status:      Spouse name: Carmelita   • Number of children: 2   • Years of education: Not on file   • Highest education level: Not on file   Social Needs   • Financial resource strain: Not on file   • Food insecurity - worry: Not on file   • Food insecurity - inability: Not on file   • Transportation needs - medical: Not on file   • Transportation needs - non-medical: Not on file   Occupational History     Employer: RETIRED   Tobacco Use   • Smoking status: Never Smoker   •  Smokeless tobacco: Never Used   Substance and Sexual Activity   • Alcohol use: Yes     Comment: occasional   • Drug use: No   • Sexual activity: Defer   Other Topics Concern   • Not on file   Social History Narrative   • Not on file        Current Outpatient Medications:   •  amLODIPine (NORVASC) 5 MG tablet, Take 1 tablet by mouth Daily., Disp: 90 tablet, Rfl: 3  •  aspirin 81 MG chewable tablet, Chew 81 mg daily., Disp: , Rfl:   •  benazepril (LOTENSIN) 10 MG tablet, Take 1 tablet by mouth Daily., Disp: 90 tablet, Rfl: 3  •  Cholecalciferol (VITAMIN D PO), Take  by mouth., Disp: , Rfl:   •  levothyroxine (SYNTHROID, LEVOTHROID) 175 MCG tablet, Take 1 tablet by mouth Daily., Disp: 90 tablet, Rfl: 3  •  metFORMIN (GLUCOPHAGE) 500 MG tablet, Take 1 tablet by mouth Daily With Breakfast., Disp: 90 tablet, Rfl: 3  •  metoprolol tartrate (LOPRESSOR) 50 MG tablet, TAKE ONE TABLET BY MOUTH TWICE A DAY, Disp: 180 tablet, Rfl: 1  •  potassium chloride (KLOR-CON) 20 MEQ packet, Take 20 mEq by mouth 2 (Two) Times a Day., Disp: 180 packet, Rfl: 3  •  rosuvastatin (CRESTOR) 10 MG tablet, Take 1 tablet by mouth Daily., Disp: 90 tablet, Rfl: 3  •  sertraline (ZOLOFT) 100 MG tablet, Take 1.5 tablets by mouth Daily., Disp: 135 tablet, Rfl: 3     Objective     History of Present Illness     Review of Systems   Constitutional: Negative.    HENT: Negative.    Eyes: Negative.    Respiratory: Negative.    Cardiovascular: Negative.    Gastrointestinal: Negative.    Genitourinary: Negative.    Musculoskeletal: Negative.    Skin: Negative.    Neurological: Negative.    Psychiatric/Behavioral: Negative.        Physical Exam   Constitutional: He is oriented to person, place, and time. He appears well-developed and well-nourished.   Pleasant, cooperative no distress, blood pressure 130/80   HENT:   Head: Normocephalic and atraumatic.   Eyes: Conjunctivae are normal. Pupils are equal, round, and reactive to light. No scleral icterus.   Neck:  Normal range of motion. Neck supple.   Cardiovascular: Normal rate, regular rhythm and normal heart sounds.   Pulmonary/Chest: Effort normal and breath sounds normal. No respiratory distress. He has no wheezes. He has no rales.   Musculoskeletal: Normal range of motion. He exhibits no edema.   Neurological: He is alert and oriented to person, place, and time.   Skin: Skin is warm and dry.   Psychiatric: He has a normal mood and affect. His behavior is normal.   Nursing note and vitals reviewed.      ASSESSMENT  CMP has a stable sugar 131 and is otherwise normal with normal creatinine of 1.2.  Hemoglobin A1c is acceptable but higher at 6.89.  TSH and free T4 both quite normal.  Lipid panel is controlled with total cholesterol 157, HDL 40, LDL 60 and triglycerides 287  #1-hypertension, controlled  #2-hyperlipidemia, reasonable control  #3-diabetes mellitus type 2, just fair control  #4-hypothyroidism, euthyroid on replacement     Problem List Items Addressed This Visit        Cardiovascular and Mediastinum    BP (high blood pressure) - Primary    Relevant Medications    benazepril (LOTENSIN) 10 MG tablet    amLODIPine (NORVASC) 5 MG tablet    Hyperlipidemia    Relevant Medications    rosuvastatin (CRESTOR) 10 MG tablet       Endocrine    Disease of thyroid gland    Relevant Medications    levothyroxine (SYNTHROID, LEVOTHROID) 175 MCG tablet    Type 2 diabetes mellitus without complication (CMS/HCC)    Relevant Medications    metFORMIN (GLUCOPHAGE) 500 MG tablet       Other    Obesity (BMI 30.0-34.9)          PLAN  the patient received a flu shot today and I recommended the Tdap and hepatitis A immunizations.  He will check with pharmacy on that.  I'm starting him on metformin 500 mg daily for his diabetes and a plan on rechecking him in 4 months with a CBC, CMP, lipid panel, hemoglobin A1c, TSH and urine microalbumin and PSA    There are no Patient Instructions on file for this visit.  No Follow-up on file.

## 2019-04-02 ENCOUNTER — OFFICE VISIT (OUTPATIENT)
Dept: FAMILY MEDICINE CLINIC | Facility: CLINIC | Age: 69
End: 2019-04-02

## 2019-04-02 VITALS
HEART RATE: 86 BPM | HEIGHT: 70 IN | OXYGEN SATURATION: 98 % | SYSTOLIC BLOOD PRESSURE: 114 MMHG | RESPIRATION RATE: 18 BRPM | TEMPERATURE: 98.4 F | DIASTOLIC BLOOD PRESSURE: 68 MMHG | WEIGHT: 232.8 LBS | BODY MASS INDEX: 33.33 KG/M2

## 2019-04-02 DIAGNOSIS — M25.551 CHRONIC PAIN OF RIGHT HIP: Primary | ICD-10-CM

## 2019-04-02 DIAGNOSIS — G89.29 CHRONIC PAIN OF RIGHT HIP: Primary | ICD-10-CM

## 2019-04-02 PROCEDURE — 99213 OFFICE O/P EST LOW 20 MIN: CPT | Performed by: NURSE PRACTITIONER

## 2019-04-02 NOTE — PROGRESS NOTES
Subjective   Marco Antonio Kendrick is a 68 y.o. male.   Chief Complaint   Patient presents with   • Hip Pain     right hip and goes down into rt leg has had it for 10 years but is getting worse      Vitals:    04/02/19 1244   BP: 114/68   Pulse: 86   Resp: 18   Temp: 98.4 °F (36.9 °C)   SpO2: 98%     No LMP for male patient.    Marco Antonio is a patient of Dr Duenas who is here to discuss hip pain. He has had this pain for 10 years. He has had no previous work up       Hip Pain    There was no injury mechanism. The pain is present in the right hip. The pain is at a severity of 6/10. The pain is moderate. The pain has been intermittent since onset. Pertinent negatives include no inability to bear weight, loss of motion, loss of sensation, muscle weakness, numbness or tingling. The symptoms are aggravated by movement. He has tried heat (advil pm ) for the symptoms. The treatment provided mild relief.         The following portions of the patient's history were reviewed and updated as appropriate: allergies, current medications, past family history, past medical history, past social history, past surgical history and problem list.    Review of Systems   Constitutional: Negative for chills, fatigue and fever.   Respiratory: Negative.    Cardiovascular: Negative.    Genitourinary: Negative for difficulty urinating.   Musculoskeletal: Negative for gait problem.        Hip pain for 10 years    Neurological: Negative for tingling, weakness and numbness.       Objective   Physical Exam   Constitutional: Vital signs are normal. He appears well-developed and well-nourished.   Cardiovascular: Normal rate.   Pulmonary/Chest: Effort normal and breath sounds normal.   Musculoskeletal:        Right hip: He exhibits tenderness. He exhibits normal range of motion, normal strength and no swelling.   Neurological:   Reflex Scores:       Patellar reflexes are 2+ on the right side and 2+ on the left side.  Skin: Skin is warm, dry and intact.        Assessment/Plan   Marco Antonio was seen today for hip pain.    Diagnoses and all orders for this visit:    Chronic pain of right hip  -     Ambulatory Referral to Orthopedic Surgery      Discussed getting xrays but patient prefers referral to ortho  Recommend tylenol as needed  Will refer to ortho for further eval   Follow up as needed

## 2019-04-25 ENCOUNTER — CLINICAL SUPPORT NO REQUIREMENTS (OUTPATIENT)
Dept: CARDIOLOGY | Facility: CLINIC | Age: 69
End: 2019-04-25

## 2019-04-25 ENCOUNTER — OFFICE VISIT (OUTPATIENT)
Dept: CARDIOLOGY | Facility: CLINIC | Age: 69
End: 2019-04-25

## 2019-04-25 VITALS
WEIGHT: 228 LBS | DIASTOLIC BLOOD PRESSURE: 72 MMHG | BODY MASS INDEX: 32.64 KG/M2 | SYSTOLIC BLOOD PRESSURE: 122 MMHG | OXYGEN SATURATION: 99 % | HEIGHT: 70 IN | HEART RATE: 64 BPM

## 2019-04-25 DIAGNOSIS — I49.5 SICK SINUS SYNDROME (HCC): Primary | ICD-10-CM

## 2019-04-25 DIAGNOSIS — I10 ESSENTIAL HYPERTENSION: ICD-10-CM

## 2019-04-25 PROCEDURE — 99214 OFFICE O/P EST MOD 30 MIN: CPT | Performed by: INTERNAL MEDICINE

## 2019-04-25 PROCEDURE — 93280 PM DEVICE PROGR EVAL DUAL: CPT | Performed by: INTERNAL MEDICINE

## 2019-04-25 NOTE — PROGRESS NOTES
Date of Office Visit: 2019  Encounter Provider: Taz Bazan MD  Place of Service: [facility] CARDIOLOGY  Patient Name: Marco Antonio Kendrick  :1950      Sick Sinus syndrome    History of Present Illness    Patient is a 68-year-old white male with a history of hypertension as well as sick sinus syndrome.  He has a permanent pacemaker that was implanted.  The device was checked today it appears to be working properly without any problems at all.  He paces predominantly in the atrium but not much in the ventricle.  He also has hypertension for which he has been on medical therapy and actually doing fairly well.    Past Medical History:   Diagnosis Date   • Arthritis    • Bipolar disorder (CMS/HCC)    • Bladder wall thickening     MILD NON-SPECIFIC URINARY BLADDER....FOLLOWED BY DR COBURN   • Bone marrow suppression     SIGNIFICANT MYELOSUPRESSION FROM FOLFOX-4 CHEMOTHERAPY   • Cancer of sigmoid colon (CMS/HCC)     STAGE III (T3N1M0)   • Depression    • Disease of thyroid gland     HYPOTHYROIDISM   • Diverticula of colon    • Enlarged prostate    • H/O allergy to eggs    • Headache    • Hyperlipidemia    • Hypertension    • Hyperthyroidism    • Obesity    • Pneumonia 2015   • PONV (postoperative nausea and vomiting)    • Sick sinus syndrome (CMS/HCC)     S/P LEFT CHEST PACEMAKER IMPLANTATION   • Splenomegaly          Past Surgical History:   Procedure Laterality Date   • APPENDECTOMY     • COLON SURGERY  2011    RESECTION   • COLONOSCOPY  2015    DR. CAGE   • COLONOSCOPY N/A 2016    Procedure: COLONOSCOPY to cecum and ti;  Surgeon: Leonel Cage MD;  Location: [facility] ENDOSCOPY;  Service:    • CYSTOSCOPY      BY DR COBURN   • PACEMAKER IMPLANTATION Left            Current Outpatient Medications:   •  aspirin 81 MG chewable tablet, Chew 81 mg daily., Disp: , Rfl:   •  benazepril (LOTENSIN) 10 MG tablet, Take 1 tablet by mouth Daily., Disp: 90 tablet, Rfl:  "3  •  Cholecalciferol (VITAMIN D PO), Take  by mouth., Disp: , Rfl:   •  levothyroxine (SYNTHROID, LEVOTHROID) 175 MCG tablet, Take 1 tablet by mouth Daily., Disp: 90 tablet, Rfl: 3  •  metFORMIN (GLUCOPHAGE) 500 MG tablet, Take 1 tablet by mouth Daily With Breakfast., Disp: 90 tablet, Rfl: 3  •  metoprolol tartrate (LOPRESSOR) 50 MG tablet, TAKE ONE TABLET BY MOUTH TWICE A DAY, Disp: 180 tablet, Rfl: 1  •  potassium chloride (KLOR-CON) 20 MEQ packet, Take 20 mEq by mouth 2 (Two) Times a Day., Disp: 180 packet, Rfl: 3  •  rosuvastatin (CRESTOR) 10 MG tablet, Take 1 tablet by mouth Daily., Disp: 90 tablet, Rfl: 3  •  sertraline (ZOLOFT) 100 MG tablet, Take 1.5 tablets by mouth Daily., Disp: 135 tablet, Rfl: 3      Social History     Socioeconomic History   • Marital status:      Spouse name: Carmelita   • Number of children: 2   • Years of education: Not on file   • Highest education level: Not on file   Occupational History     Employer: RETIRED   Tobacco Use   • Smoking status: Never Smoker   • Smokeless tobacco: Never Used   • Tobacco comment: caffeine use   Substance and Sexual Activity   • Alcohol use: Yes     Comment: occasional   • Drug use: No   • Sexual activity: Defer         Review of Systems   Constitution: Negative.   HENT: Negative.    Eyes: Negative.    Cardiovascular: Positive for dyspnea on exertion.   Respiratory: Negative.    Endocrine: Negative.    Skin: Negative.    Musculoskeletal: Negative.    Gastrointestinal: Negative.    Neurological: Negative.    Psychiatric/Behavioral: Negative.        Procedures    Procedures        Objective:    /72 (BP Location: Right arm, Patient Position: Sitting, Cuff Size: Large Adult)   Pulse 64   Ht 177.8 cm (70\")   Wt 103 kg (228 lb)   SpO2 99%   BMI 32.71 kg/m²         Physical Exam   Constitutional: He is oriented to person, place, and time. He appears well-developed and well-nourished.   HENT:   Head: Normocephalic.   Eyes: Pupils are equal, " round, and reactive to light.   Neck: Normal range of motion. No JVD present. Carotid bruit is not present. No thyromegaly present.   Cardiovascular: Normal rate, regular rhythm, S1 normal, S2 normal, normal heart sounds and intact distal pulses. Exam reveals no gallop and no friction rub.   No murmur heard.  Pulmonary/Chest: Effort normal and breath sounds normal.   Abdominal: Soft. Bowel sounds are normal.   Musculoskeletal: He exhibits no edema.   Neurological: He is alert and oriented to person, place, and time.   Skin: Skin is warm, dry and intact. No erythema.   Psychiatric: He has a normal mood and affect.   Vitals reviewed.          Assessment:       Diagnosis Plan   1. Sick sinus syndrome (CMS/HCC)     2. Essential hypertension         1.  Sick sinus syndrome: Status post permanent pacemaker implant with normal function  2.  Hypertension: Controlled  Plan:

## 2019-04-30 ENCOUNTER — OFFICE VISIT (OUTPATIENT)
Dept: ORTHOPEDIC SURGERY | Facility: CLINIC | Age: 69
End: 2019-04-30

## 2019-04-30 VITALS — WEIGHT: 236.4 LBS | BODY MASS INDEX: 33.84 KG/M2 | HEIGHT: 70 IN | TEMPERATURE: 98.8 F

## 2019-04-30 DIAGNOSIS — M25.551 HIP PAIN, RIGHT: Primary | ICD-10-CM

## 2019-04-30 PROCEDURE — 73502 X-RAY EXAM HIP UNI 2-3 VIEWS: CPT | Performed by: NURSE PRACTITIONER

## 2019-04-30 PROCEDURE — 99213 OFFICE O/P EST LOW 20 MIN: CPT | Performed by: NURSE PRACTITIONER

## 2019-04-30 PROCEDURE — 20610 DRAIN/INJ JOINT/BURSA W/O US: CPT | Performed by: NURSE PRACTITIONER

## 2019-04-30 RX ORDER — METHYLPREDNISOLONE ACETATE 80 MG/ML
80 INJECTION, SUSPENSION INTRA-ARTICULAR; INTRALESIONAL; INTRAMUSCULAR; SOFT TISSUE
Status: COMPLETED | OUTPATIENT
Start: 2019-04-30 | End: 2019-04-30

## 2019-04-30 RX ORDER — LIDOCAINE HYDROCHLORIDE 10 MG/ML
2 INJECTION, SOLUTION EPIDURAL; INFILTRATION; INTRACAUDAL; PERINEURAL
Status: COMPLETED | OUTPATIENT
Start: 2019-04-30 | End: 2019-04-30

## 2019-04-30 RX ADMIN — LIDOCAINE HYDROCHLORIDE 2 ML: 10 INJECTION, SOLUTION EPIDURAL; INFILTRATION; INTRACAUDAL; PERINEURAL at 15:25

## 2019-04-30 RX ADMIN — METHYLPREDNISOLONE ACETATE 80 MG: 80 INJECTION, SUSPENSION INTRA-ARTICULAR; INTRALESIONAL; INTRAMUSCULAR; SOFT TISSUE at 15:25

## 2019-04-30 NOTE — PROGRESS NOTES
Patient: Marco Antonio Kendrick  YOB: 1950 68 y.o. male  Medical Record Number: [id number]    Chief Complaints:   Chief Complaint   Patient presents with   • Right Hip - Establish Care, Pain       History of Present Illness:Marco Antonio Kendrick is a 68 y.o. male who presents as a new patient both myself as well as to the practice with complaints of right hip pain.  Patient reports he has had intermittent pain for the last few years, describes it as a moderate intermittent grinding type pain worse with sitting walking lying on his side.  Better with rest.    Allergies:   Allergies   Allergen Reactions   • Eggs Or Egg-Derived Products Nausea And Vomiting       Medications:   Current Outpatient Medications   Medication Sig Dispense Refill   • aspirin 81 MG chewable tablet Chew 81 mg daily.     • benazepril (LOTENSIN) 10 MG tablet Take 1 tablet by mouth Daily. 90 tablet 3   • Cholecalciferol (VITAMIN D PO) Take  by mouth.     • levothyroxine (SYNTHROID, LEVOTHROID) 175 MCG tablet Take 1 tablet by mouth Daily. 90 tablet 3   • metoprolol tartrate (LOPRESSOR) 50 MG tablet TAKE ONE TABLET BY MOUTH TWICE A  tablet 1   • potassium chloride (KLOR-CON) 20 MEQ packet Take 20 mEq by mouth 2 (Two) Times a Day. 180 packet 3   • rosuvastatin (CRESTOR) 10 MG tablet Take 1 tablet by mouth Daily. 90 tablet 3   • sertraline (ZOLOFT) 100 MG tablet Take 1.5 tablets by mouth Daily. 135 tablet 3   • metFORMIN (GLUCOPHAGE) 500 MG tablet Take 1 tablet by mouth Daily With Breakfast. 90 tablet 3     No current facility-administered medications for this visit.          The following portions of the patient's history were reviewed and updated as appropriate: allergies, current medications, past family history, past medical history, past social history, past surgical history and problem list.    Review of Systems:   A 14 point review of systems was performed. All systems negative except pertinent positives/negative listed in HPI  "above    Physical Exam:   Vitals:    04/30/19 1449   Temp: 98.8 °F (37.1 °C)   TempSrc: Temporal   Weight: 107 kg (236 lb 6.4 oz)   Height: 177.8 cm (70\")       General: A and O x 3, ASA, NAD    SCLERA:    Normal    DENTITION:   Normal  Skin clear no unusual lesions noted  Right hip patient is tender of her right hip greater trochanteric bursa he is otherwise had normal internal and external rotation with a negative Stinchfield negative logroll calf is soft and nontender    Radiology:  Xrays 2 views of right hip ordered and reviewed today secondary to pain and show mild arthritic changes.  No compared to views available    Assessment/Plan:  Right hip greater trochanteric bursitis    Patient I discussed treatment options.  He would like to proceed with right hip bursa injection.  Prior to injection risks were discussed including pain, infection, elevated blood sugar.  Patient verbalized understanding would like to proceed with injection.  We referred him to outpatient physical therapy and we will see him back as needed    Large Joint Arthrocentesis: R greater trochanteric bursa  Date/Time: 4/30/2019 3:25 PM  Consent given by: patient  Site marked: site marked  Timeout: Immediately prior to procedure a time out was called to verify the correct patient, procedure, equipment, support staff and site/side marked as required   Supporting Documentation  Indications: pain   Procedure Details  Location: hip - R greater trochanteric bursa  Preparation: Patient was prepped and draped in the usual sterile fashion  Needle size: 18 G  Approach: lateral  Medications administered: 80 mg methylPREDNISolone acetate 80 MG/ML; 2 mL lidocaine PF 1% 1 %  Patient tolerance: patient tolerated the procedure well with no immediate complications        "

## 2019-05-14 ENCOUNTER — LAB (OUTPATIENT)
Dept: OTHER | Facility: HOSPITAL | Age: 69
End: 2019-05-14

## 2019-05-14 ENCOUNTER — OFFICE VISIT (OUTPATIENT)
Dept: ONCOLOGY | Facility: CLINIC | Age: 69
End: 2019-05-14

## 2019-05-14 VITALS
SYSTOLIC BLOOD PRESSURE: 126 MMHG | TEMPERATURE: 97.9 F | DIASTOLIC BLOOD PRESSURE: 79 MMHG | OXYGEN SATURATION: 97 % | RESPIRATION RATE: 16 BRPM | HEART RATE: 83 BPM | HEIGHT: 71 IN | BODY MASS INDEX: 32.87 KG/M2 | WEIGHT: 234.8 LBS

## 2019-05-14 DIAGNOSIS — C18.7 CANCER OF SIGMOID COLON (HCC): Primary | ICD-10-CM

## 2019-05-14 LAB
ALBUMIN SERPL-MCNC: 4.1 G/DL (ref 3.5–5.2)
ALBUMIN/GLOB SERPL: 1.3 G/DL
ALP SERPL-CCNC: 61 U/L (ref 39–117)
ALT SERPL W P-5'-P-CCNC: 19 U/L (ref 1–41)
ANION GAP SERPL CALCULATED.3IONS-SCNC: 11 MMOL/L
AST SERPL-CCNC: 15 U/L (ref 1–40)
BASOPHILS # BLD AUTO: 0.07 10*3/MM3 (ref 0–0.2)
BASOPHILS NFR BLD AUTO: 1 % (ref 0–1.5)
BILIRUB SERPL-MCNC: 0.5 MG/DL (ref 0.1–1.2)
BUN BLD-MCNC: 17 MG/DL (ref 8–23)
BUN/CREAT SERPL: 15.6 (ref 7–25)
CALCIUM SPEC-SCNC: 9.1 MG/DL (ref 8.6–10.5)
CEA SERPL-MCNC: 1.35 NG/ML
CHLORIDE SERPL-SCNC: 105 MMOL/L (ref 98–107)
CO2 SERPL-SCNC: 26 MMOL/L (ref 22–29)
CREAT BLD-MCNC: 1.09 MG/DL (ref 0.76–1.27)
DEPRECATED RDW RBC AUTO: 42.5 FL (ref 37–54)
EOSINOPHIL # BLD AUTO: 0.13 10*3/MM3 (ref 0–0.4)
EOSINOPHIL NFR BLD AUTO: 1.9 % (ref 0.3–6.2)
ERYTHROCYTE [DISTWIDTH] IN BLOOD BY AUTOMATED COUNT: 13 % (ref 12.3–15.4)
GFR SERPL CREATININE-BSD FRML MDRD: 67 ML/MIN/1.73
GLOBULIN UR ELPH-MCNC: 3.1 GM/DL
GLUCOSE BLD-MCNC: 125 MG/DL (ref 65–99)
HCT VFR BLD AUTO: 41.2 % (ref 37.5–51)
HGB BLD-MCNC: 13.7 G/DL (ref 13–17.7)
IMM GRANULOCYTES # BLD AUTO: 0.01 10*3/MM3 (ref 0–0.05)
IMM GRANULOCYTES NFR BLD AUTO: 0.1 % (ref 0–0.5)
LYMPHOCYTES # BLD AUTO: 2.6 10*3/MM3 (ref 0.7–3.1)
LYMPHOCYTES NFR BLD AUTO: 37.6 % (ref 19.6–45.3)
MCH RBC QN AUTO: 30 PG (ref 26.6–33)
MCHC RBC AUTO-ENTMCNC: 33.3 G/DL (ref 31.5–35.7)
MCV RBC AUTO: 90.2 FL (ref 79–97)
MONOCYTES # BLD AUTO: 0.52 10*3/MM3 (ref 0.1–0.9)
MONOCYTES NFR BLD AUTO: 7.5 % (ref 5–12)
NEUTROPHILS # BLD AUTO: 3.58 10*3/MM3 (ref 1.7–7)
NEUTROPHILS NFR BLD AUTO: 51.9 % (ref 42.7–76)
NRBC BLD AUTO-RTO: 0 /100 WBC (ref 0–0.2)
PLATELET # BLD AUTO: 207 10*3/MM3 (ref 140–450)
PMV BLD AUTO: 10.6 FL (ref 6–12)
POTASSIUM BLD-SCNC: 4.1 MMOL/L (ref 3.5–5.2)
PROT SERPL-MCNC: 7.2 G/DL (ref 6–8.5)
RBC # BLD AUTO: 4.57 10*6/MM3 (ref 4.14–5.8)
SODIUM BLD-SCNC: 142 MMOL/L (ref 136–145)
WBC NRBC COR # BLD: 6.91 10*3/MM3 (ref 3.4–10.8)

## 2019-05-14 PROCEDURE — 36415 COLL VENOUS BLD VENIPUNCTURE: CPT

## 2019-05-14 PROCEDURE — 85025 COMPLETE CBC W/AUTO DIFF WBC: CPT | Performed by: INTERNAL MEDICINE

## 2019-05-14 PROCEDURE — 82378 CARCINOEMBRYONIC ANTIGEN: CPT | Performed by: INTERNAL MEDICINE

## 2019-05-14 PROCEDURE — 99213 OFFICE O/P EST LOW 20 MIN: CPT | Performed by: INTERNAL MEDICINE

## 2019-05-14 PROCEDURE — 80053 COMPREHEN METABOLIC PANEL: CPT | Performed by: INTERNAL MEDICINE

## 2019-05-14 NOTE — PROGRESS NOTES
Subjective .     REASONS FOR FOLLOWUP:  Colon cancer    HISTORY OF PRESENT ILLNESS:  The patient is a 68 y.o. year old male  who is here for follow-up with the above-mentioned history.    No new health problems over the past year.  Denies bleeding.  Bowel movements regular.  Denies nausea.  Having some hip pain which she states was evaluated.  He states he was told this is due to arthritis.  He is trying to exercise and trying to watch what he eats and thinks he has lost some weight with effort.    Past Medical History:   Diagnosis Date   • Arthritis    • Bipolar disorder (CMS/HCC)    • Bladder wall thickening     MILD NON-SPECIFIC URINARY BLADDER....FOLLOWED BY DR COBURN   • Bone marrow suppression     SIGNIFICANT MYELOSUPRESSION FROM FOLFOX-4 CHEMOTHERAPY   • Cancer of sigmoid colon (CMS/HCC)     STAGE III (T3N1M0)   • Depression    • Disease of thyroid gland     HYPOTHYROIDISM   • Diverticula of colon    • Enlarged prostate    • H/O allergy to eggs    • Headache    • Hyperlipidemia    • Hypertension    • Hyperthyroidism    • Obesity    • Pneumonia 11/2015   • PONV (postoperative nausea and vomiting)    • Sick sinus syndrome (CMS/HCC)     S/P LEFT CHEST PACEMAKER IMPLANTATION   • Splenomegaly        HEMATOLOGIC/ONCOLOGIC HISTORY:  (History from previous dates can be found in the separate document.)    MEDICATIONS    Current Outpatient Medications:   •  aspirin 81 MG chewable tablet, Chew 81 mg daily., Disp: , Rfl:   •  benazepril (LOTENSIN) 10 MG tablet, Take 1 tablet by mouth Daily., Disp: 90 tablet, Rfl: 3  •  Cholecalciferol (VITAMIN D PO), Take  by mouth., Disp: , Rfl:   •  levothyroxine (SYNTHROID, LEVOTHROID) 175 MCG tablet, Take 1 tablet by mouth Daily., Disp: 90 tablet, Rfl: 3  •  metFORMIN (GLUCOPHAGE) 500 MG tablet, Take 1 tablet by mouth Daily With Breakfast., Disp: 90 tablet, Rfl: 3  •  metoprolol tartrate (LOPRESSOR) 50 MG tablet, TAKE ONE TABLET BY MOUTH TWICE A DAY, Disp: 180 tablet, Rfl: 1  •   potassium chloride (KLOR-CON) 20 MEQ packet, Take 20 mEq by mouth 2 (Two) Times a Day., Disp: 180 packet, Rfl: 3  •  rosuvastatin (CRESTOR) 10 MG tablet, Take 1 tablet by mouth Daily., Disp: 90 tablet, Rfl: 3  •  sertraline (ZOLOFT) 100 MG tablet, Take 1.5 tablets by mouth Daily., Disp: 135 tablet, Rfl: 3    ALLERGIES:     Allergies   Allergen Reactions   • Eggs Or Egg-Derived Products Nausea And Vomiting       SOCIAL HISTORY:       Social History     Socioeconomic History   • Marital status:      Spouse name: Carmelita   • Number of children: 2   • Years of education: Not on file   • Highest education level: Not on file   Occupational History     Employer: RETIRED   Tobacco Use   • Smoking status: Never Smoker   • Smokeless tobacco: Never Used   • Tobacco comment: caffeine use   Substance and Sexual Activity   • Alcohol use: Yes     Comment: occasional   • Drug use: No   • Sexual activity: Defer         FAMILY HISTORY:  Family History   Problem Relation Age of Onset   • Colon cancer Father    • Melanoma Brother    • Heart disease Other    • Hypertension Other    • Diabetes Other    • Cancer Other    • Stroke Other        REVIEW OF SYSTEMS:  Review of Systems   Constitutional: Negative for activity change.   HENT: Negative for nosebleeds and trouble swallowing.    Respiratory: Negative for shortness of breath and wheezing.    Cardiovascular: Negative for chest pain and palpitations.   Gastrointestinal: Negative for constipation, diarrhea and nausea.   Genitourinary: Negative for dysuria and hematuria.   Musculoskeletal: Negative for arthralgias and myalgias.   Neurological: Negative for seizures and syncope.   Hematological: Negative for adenopathy. Does not bruise/bleed easily.   Psychiatric/Behavioral: Negative for confusion.           Objective    Vitals:    05/14/19 1041   BP: 126/79   Pulse: 83   Resp: 16   Temp: 97.9 °F (36.6 °C)   TempSrc: Oral   SpO2: 97%   Weight: 107 kg (234 lb 12.8 oz)   Height: 180  "cm (70.87\")  Comment: new ht   PainSc: 0-No pain     Current Status 5/14/2019   ECOG score 0      PHYSICAL EXAM:    CONSTITUTIONAL:  Vital signs reviewed.  No distress, looks comfortable.  Overweight  EYES:  Conjunctiva and lids unremarkable.  PERRLA  EARS,NOSE,MOUTH,THROAT:  Ears and nose appear unremarkable.  Lips, teeth, gums appear unremarkable.  RESPIRATORY:  Normal respiratory effort.  Lungs clear to auscultation bilaterally.  CARDIOVASCULAR:  Normal S1, S2.  No murmurs rubs or gallops.  No significant lower extremity edema.  GASTROINTESTINAL: Abdomen appears unremarkable.  Nontender.  No hepatomegaly.  No splenomegaly.  LYMPHATIC:  No cervical, supraclavicular, axillary lymphadenopathy.  SKIN:  Warm.  No rashes.  PSYCHIATRIC:  Normal judgment and insight.  Normal mood and affect.    RECENT LABS:        WBC   Date/Time Value Ref Range Status   05/14/2019 10:31 AM 6.91 3.40 - 10.80 10*3/mm3 Final   06/01/2018 10:55 AM 5.78 4.50 - 10.70 10*3/mm3 Final     Hemoglobin   Date/Time Value Ref Range Status   05/14/2019 10:31 AM 13.7 13.0 - 17.7 g/dL Final     Platelets   Date/Time Value Ref Range Status   05/14/2019 10:31  140 - 450 10*3/mm3 Final       Assessment/Plan     ASSESSMENT:  *Stage III colon cancer in 2011.  Appears to remain in remission.  Await today's CEA .    * Mild nonspecific urinary bladder wall thickening on CAT scan 5/6/14 which was similar to 4/30/13 CAT scan.  He saw Dr. Garcia of urology and a cystoscopy was unremarkable.  On CAT scan 4/13/16 he still has the mild nonspecific urinary bladder wall thickening of uncertain significance.  Since this is been worked up in the past, I did not send him to Dr. Garcia again.    *Obesity.  We have discussed the benefits of weight loss such as reducing risk of future cancers as well as helping with other things such as cardiovascular health and blood sugar control.  We discussed again today the importance of weight loss.  I reminded him obesity is a " risk factor for malignancies.    He weighed 250 pounds may have 2018.  He is down to 234 pounds today.  Hopefully he can keep up his success.    PLAN:  · M.D. 1 year.  One week prior:  · CBC, CMP, CEA  · Patient wants to maintain annual follow-up  · Colonoscopy 7/25/16, Dr. Cage: Unremarkable.  Next colonoscopy planned 5 years later.  · Planning no more scheduled CT scans.

## 2019-05-21 ENCOUNTER — TREATMENT (OUTPATIENT)
Dept: PHYSICAL THERAPY | Facility: CLINIC | Age: 69
End: 2019-05-21

## 2019-05-21 DIAGNOSIS — M25.551 RIGHT HIP PAIN: Primary | ICD-10-CM

## 2019-05-21 PROCEDURE — 97140 MANUAL THERAPY 1/> REGIONS: CPT | Performed by: PHYSICAL THERAPIST

## 2019-05-21 PROCEDURE — 97110 THERAPEUTIC EXERCISES: CPT | Performed by: PHYSICAL THERAPIST

## 2019-05-21 PROCEDURE — 97161 PT EVAL LOW COMPLEX 20 MIN: CPT | Performed by: PHYSICAL THERAPIST

## 2019-05-21 NOTE — PROGRESS NOTES
Physical Therapy Initial Evaluation and Plan of Care        Patient: Marco Antonio Kendrick   : 1950  Diagnosis/ICD-10 Code:  Right hip pain [M25.551]  Referring practitioner: SADAF Zamora  Date of Initial Visit: 2019  Today's Date: 2019  Patient seen for 1 sessions           Subjective Questionnaire: 43/80      Subjective Evaluation    History of Present Illness  Date of onset: 2/10/2017  Mechanism of injury: Pt reports R hip pain for 2 years. Difficulty with squatting, stairs, sleeping.  The pain is at the lateral hip and above the illiac crest. Pain is worst when lying on the hip. Uses no heat/ice or medication. Xray showed mild arthritis and MD gave dx of bursitis. Has difficulty getting objects off floor and he feels pain and weakness and putting on his shoes are difficult.    Subjective comment: R hip pain  Patient Occupation: retired Quality of life: good    Pain  Current pain ratin  At best pain ratin  At worst pain ratin  Location: R hip  Quality: sharp, dull ache, throbbing and pressure  Aggravating factors: squatting, prolonged positioning, stairs and sleeping    Diagnostic Tests  X-ray: abnormal    Treatments  Current treatment: physical therapy  Patient Goals  Patient goals for therapy: increased strength, decreased pain and independence with ADLs/IADLs             Objective       Static Posture     Comments  B knee and hip flex, R illiac crest low    Postural Observations  Seated posture: fair  Correction of posture: has no consistent effect        Palpation     Additional Palpation Details  R ASIS low, med malleolus even, R knee lower than L in hooklying   R leg 89 cm   L leg 92 cm ASIS to med malleolus    Neurological Testing     Sensation     Lumbar   Left   Intact: light touch    Right   Intact: light touch    Active Range of Motion     Additional Active Range of Motion Details  AROM Flexion 25% lumbar   Rotation R 50%, L 25% and pain R  Side bending R 50%  moderate pain, L 50% mild pain    Strength/Myotome Testing     Left Hip   Planes of Motion   Flexion: 4-  Extension: 4-  Abduction: 4-  External rotation: 4-    Right Hip   Planes of Motion   Flexion: 3+  Extension: 3+  Abduction: 3+  External rotation: 3+    Additional Strength Details  Pain with MMT at R hip      Muscle Activation   Patient unable to activate left transverse abdominals, left multifidus, right transverse abdominals and right multifidus.     Tests     Right Hip   Positive FADIR, Glory and piriformis.   Grant: Positive.     Ambulation     Observational Gait     Additional Observational Gait Details  Slight antalgic gait with decreasd stance phase R, R hip upward translation    Functional Assessment   Squat   Pain, trunk lean left, left tibial anterior translation beyond toes and right tibial anterior translation beyond toes.     Comments  Bridge R hip sinks down and cannot complete the ROM    General Comments     Hip Comments   Tremoring R LE with passive stretching, pt states his entire family has this problem on his moms side but his is only in the R leg  HS 44 degrees R in supine 90/90         Assessment & Plan     Assessment  Impairments: abnormal gait, abnormal muscle firing, abnormal or restricted ROM, activity intolerance, impaired balance, impaired physical strength, lacks appropriate home exercise program, pain with function and weight-bearing intolerance  Assessment details: Pt presents with pain in the R hip with flexibility deficits in the R HS, IT band and hip flexor.  He has weakness in the hip and core, impaired lumbar ROM, abnormal gait and slight R LE leg length which I may address with heel lift but his R innominate is also anterior so I would like to reduce that with MET and manual work first.  He would benefit from skilled PT to address his deficits and reduce his pain.   Prognosis: good  Functional Limitations: sleeping, uncomfortable because of pain, stooping and unable to  perform repetitive tasks  Goals  Plan Goals: SHORT TERM GOALS:  3 weeks       1. Pt independent with HEP without increase in pain  2. Pt to demonstrate R hip strength 4-/5 or greater to improve stability with ambulation and reduced friction of the trochanteric bursa  3. Pt to report being able to navigate 4 stairs with pain < or equal to 6/10   4. Pt will have equal pelvic landmarks     LONG TERM GOALS:   6 weeks  1. Pt to demonstrate ability to perform full functional squat with good form and control of the hips and without increasing pain  2. Pt to demonstrate davis hip strength to 4/5 or greater to improve safety with ambulation on uneven surfaces  3. Pt to demonstrate improved HS flexibility to 35 degrees in supine 90/90 and negative Glory indicating reduced IT band tightness  4. Pt to tolerate shoe insert R to accommodate R leg length discrepancy and report decreased pain in the hip since wearing      Plan  Therapy options: will be seen for skilled physical therapy services  Planned modality interventions: microcurrent electrical stimulation, thermotherapy (hydrocollator packs), ultrasound, high voltage pulsed current (spasm management), high voltage pulsed current (pain management), electrical stimulation/Russian stimulation, cryotherapy, TENS and traction  Planned therapy interventions: manual therapy, neuromuscular re-education, soft tissue mobilization, strengthening, stretching, joint mobilization, home exercise program, gait training, flexibility, abdominal trunk stabilization, ADL retraining, balance/weight-bearing training, body mechanics training, therapeutic activities and transfer training  Frequency: 2x week  Duration in weeks: 8  Treatment plan discussed with: patient        Manual Therapy:    13     mins  [id number];  Therapeutic Exercise:    14     mins  60592;     Neuromuscular John Paul:    0    mins  60104;    Therapeutic Activity:     0     mins  28479;     Gait Trainin     mins  58170;      Ultrasound:     0     mins  30394;    Electrical Stimulation:    0     mins  49420 ( );  Dry Needling     0     mins self-pay    Timed Treatment:   27   mins   Total Treatment:     49   mins    PT SIGNATURE: Michelle Arias, PT   DATE TREATMENT INITIATED: 5/21/2019    Medicare Initial Certification  Certification Period: 8/19/2019  I certify that the therapy services are furnished while this patient is under my care.  The services outlined above are required by this patient, and will be reviewed every 90 days.     PHYSICIAN: Marivel Chavira, APRN      DATE:     Please sign and return via fax to [phone].. Thank you, [facility].

## 2019-08-27 ENCOUNTER — CLINICAL SUPPORT (OUTPATIENT)
Dept: ORTHOPEDIC SURGERY | Facility: CLINIC | Age: 69
End: 2019-08-27

## 2019-08-27 VITALS — BODY MASS INDEX: 36.08 KG/M2 | HEIGHT: 70 IN | WEIGHT: 252 LBS

## 2019-08-27 DIAGNOSIS — M70.61 TROCHANTERIC BURSITIS OF RIGHT HIP: Primary | ICD-10-CM

## 2019-08-27 PROCEDURE — 20610 DRAIN/INJ JOINT/BURSA W/O US: CPT | Performed by: NURSE PRACTITIONER

## 2019-08-27 RX ORDER — AMLODIPINE BESYLATE 5 MG/1
TABLET ORAL
COMMUNITY
Start: 2019-07-07 | End: 2020-02-04

## 2019-08-27 RX ORDER — METHYLPREDNISOLONE ACETATE 80 MG/ML
80 INJECTION, SUSPENSION INTRA-ARTICULAR; INTRALESIONAL; INTRAMUSCULAR; SOFT TISSUE
Status: COMPLETED | OUTPATIENT
Start: 2019-08-27 | End: 2019-08-27

## 2019-08-27 RX ADMIN — METHYLPREDNISOLONE ACETATE 80 MG: 80 INJECTION, SUSPENSION INTRA-ARTICULAR; INTRALESIONAL; INTRAMUSCULAR; SOFT TISSUE at 08:32

## 2019-08-27 NOTE — PROGRESS NOTES
8/27/2019    Marco Antonio DOMINIQUE Maupin is here today for lateral hip pain. Pt has undergone injection of the hip bursa in the past with good resolution of symptoms. Pt is requesting a repeat injection.     Hip Bursa Injection Procedure Note:    Large Joint Arthrocentesis: R greater trochanteric bursa  Date/Time: 8/27/2019 8:32 AM  Consent given by: patient  Site marked: site marked  Timeout: Immediately prior to procedure a time out was called to verify the correct patient, procedure, equipment, support staff and site/side marked as required   Supporting Documentation  Indications: pain and joint swelling   Procedure Details  Location: hip - R greater trochanteric bursa  Preparation: Patient was prepped and draped in the usual sterile fashion  Needle size: 22 G (21)  Approach: anterolateral  Medications administered: 4 mL lidocaine (cardiac); 80 mg methylPREDNISolone acetate 80 MG/ML  Patient tolerance: patient tolerated the procedure well with no immediate complications      Prior to injection risks were discussed including pain, infection, elevated blood sugar.  Patient verbalized understanding would like to proceed with injection    At the conclusion of the injection I discussed the importance of continued fascial stretching on a daily basis. I will see the patient on a PRN basis or if the symptoms should fail to improve or worsen.    Marivel Chavira APRN  8/27/2019

## 2019-09-09 RX ORDER — METOPROLOL TARTRATE 50 MG/1
TABLET, FILM COATED ORAL
Qty: 180 TABLET | Refills: 3 | Status: SHIPPED | OUTPATIENT
Start: 2019-09-09 | End: 2020-01-27 | Stop reason: SDUPTHER

## 2019-11-07 ENCOUNTER — CLINICAL SUPPORT NO REQUIREMENTS (OUTPATIENT)
Dept: CARDIOLOGY | Facility: CLINIC | Age: 69
End: 2019-11-07

## 2019-11-07 ENCOUNTER — OFFICE VISIT (OUTPATIENT)
Dept: CARDIOLOGY | Facility: CLINIC | Age: 69
End: 2019-11-07

## 2019-11-07 VITALS
HEART RATE: 61 BPM | HEIGHT: 70 IN | WEIGHT: 229 LBS | BODY MASS INDEX: 32.78 KG/M2 | SYSTOLIC BLOOD PRESSURE: 120 MMHG | OXYGEN SATURATION: 95 % | DIASTOLIC BLOOD PRESSURE: 78 MMHG

## 2019-11-07 DIAGNOSIS — E07.9 DISEASE OF THYROID GLAND: ICD-10-CM

## 2019-11-07 DIAGNOSIS — I49.5 SICK SINUS SYNDROME (HCC): Primary | ICD-10-CM

## 2019-11-07 DIAGNOSIS — E66.9 OBESITY (BMI 30.0-34.9): ICD-10-CM

## 2019-11-07 DIAGNOSIS — E11.9 TYPE 2 DIABETES MELLITUS WITHOUT COMPLICATION, WITHOUT LONG-TERM CURRENT USE OF INSULIN (HCC): ICD-10-CM

## 2019-11-07 DIAGNOSIS — E78.5 HYPERLIPIDEMIA, UNSPECIFIED HYPERLIPIDEMIA TYPE: ICD-10-CM

## 2019-11-07 DIAGNOSIS — Z95.0 HISTORY OF PERMANENT CARDIAC PACEMAKER PLACEMENT: ICD-10-CM

## 2019-11-07 DIAGNOSIS — I10 ESSENTIAL HYPERTENSION: ICD-10-CM

## 2019-11-07 PROCEDURE — 93280 PM DEVICE PROGR EVAL DUAL: CPT | Performed by: INTERNAL MEDICINE

## 2019-11-07 PROCEDURE — 93000 ELECTROCARDIOGRAM COMPLETE: CPT | Performed by: NURSE PRACTITIONER

## 2019-11-07 PROCEDURE — 99213 OFFICE O/P EST LOW 20 MIN: CPT | Performed by: NURSE PRACTITIONER

## 2019-11-07 NOTE — PROGRESS NOTES
Date of Office Visit: 2019  Encounter Provider: SADAF Holbrook  Place of Service: [facility] CARDIOLOGY  Patient Name: Marco Antonio Kendrick  :1950  Primary Cardiologist: Dr. Bazan    Subjective:     Chief Complaint:  6 month follow up visit Sick Sinus Syndrome with PPM    History of Present Illness:  Marco Antonio Kendrick is a pleasant 69 y.o. male who I have seen once before.  Outside records have been obtained and reviewed by me. The patient has a past medical history of sick sinus syndrome with left chest pacemaker, hypertension, hyperlipidemia, colon cancer, bipolar disorder, thyroid disease, pneumonia, renal insufficiency and obesity.     10/24/2018 I saw the patient for the first time for a follow-up visit.  He been feeling really well since his last visit without chest pain, shortness breath, edema, palpitations, dizziness, lightness, syncope, near syncope, falls, PND, orthopnea.  He had some mild fatigue but was not limiting to him.  He had been tested for sleep apnea but did not have the diagnosis.  He was walking every other night at least a mile and a half.  His pacemaker was functioning appropriately at that day.  Some mild renal insufficiency that was being monitored by his PCP Dr. Duenas I recommended possible increase in medication titration of his Crestor based on his lipid panel but deferred to his PCP.  I encouraged continued exercising and may no other changes and recommended he follow-up with Dr. Bazan in 6 months.    2019 was last in the office to see Dr. Bazan who reported his device was working properly.  He was predominantly atrial paced and not much ventricular pacing.  He felt he was doing well and made no changes.    He is presenting today for a six-month follow-up visit and device check.  He has been doing well since his last visit.  He denies any cardiac complaints.  He denies any chest pain, shortness breath, PND, orthopnea, lower  extremity edema, dizziness, lightness, syncope, near syncope, palpitations or significant fatigue.  His blood pressure is good today.  He had a device check today that shows one fast AMB episode recorded on 6/12/19 with a rate of 187 beats per minute lasting 9 beats and appeared to be one-to-one conduction.  Otherwise testing was normal and estimated battery life of 5-1/2 years.  He does not recall any palpitations.         Past Medical History:   Diagnosis Date   • Arthritis    • Bipolar disorder (CMS/HCC)    • Bladder wall thickening     MILD NON-SPECIFIC URINARY BLADDER....FOLLOWED BY DR COBURN   • Bone marrow suppression     SIGNIFICANT MYELOSUPRESSION FROM FOLFOX-4 CHEMOTHERAPY   • Cancer of sigmoid colon (CMS/HCC)     STAGE III (T3N1M0)   • Depression    • Disease of thyroid gland     HYPOTHYROIDISM   • Diverticula of colon    • Enlarged prostate    • H/O allergy to eggs    • Headache    • Hyperlipidemia    • Hypertension    • Hyperthyroidism    • Obesity    • Pneumonia 11/2015   • PONV (postoperative nausea and vomiting)    • Sick sinus syndrome (CMS/HCC)     S/P LEFT CHEST PACEMAKER IMPLANTATION   • Splenomegaly      Past Surgical History:   Procedure Laterality Date   • APPENDECTOMY     • COLON SURGERY  2011    RESECTION   • COLONOSCOPY  01/2015    DR. CAGE   • COLONOSCOPY N/A 7/25/2016    Procedure: COLONOSCOPY to cecum and ti;  Surgeon: Leonel Cage MD;  Location: [facility] ENDOSCOPY;  Service:    • CYSTOSCOPY      BY DR COBURN   • PACEMAKER IMPLANTATION Left      Outpatient Medications Prior to Visit   Medication Sig Dispense Refill   • amLODIPine (NORVASC) 5 MG tablet      • aspirin 81 MG chewable tablet Chew 81 mg daily.     • benazepril (LOTENSIN) 10 MG tablet Take 1 tablet by mouth Daily. 90 tablet 3   • Cholecalciferol (VITAMIN D PO) Take  by mouth.     • levothyroxine (SYNTHROID, LEVOTHROID) 175 MCG tablet Take 1 tablet by mouth Daily. 90 tablet 3   • metFORMIN (GLUCOPHAGE) 500 MG tablet  Take 1 tablet by mouth Daily With Breakfast. 90 tablet 3   • metoprolol tartrate (LOPRESSOR) 50 MG tablet TAKE ONE TABLET BY MOUTH TWICE A  tablet 3   • potassium chloride (KLOR-CON) 20 MEQ packet Take 20 mEq by mouth 2 (Two) Times a Day. 180 packet 3   • rosuvastatin (CRESTOR) 10 MG tablet Take 1 tablet by mouth Daily. 90 tablet 3   • sertraline (ZOLOFT) 100 MG tablet Take 1.5 tablets by mouth Daily. 135 tablet 3     No facility-administered medications prior to visit.        Allergies as of 11/07/2019 - Reviewed 11/07/2019   Allergen Reaction Noted   • Eggs or egg-derived products Nausea And Vomiting 03/03/2016     Social History     Socioeconomic History   • Marital status:      Spouse name: Carmelita   • Number of children: 2   • Years of education: Not on file   • Highest education level: Not on file   Occupational History     Employer: RETIRED   Tobacco Use   • Smoking status: Never Smoker   • Smokeless tobacco: Never Used   • Tobacco comment: caffeine use   Substance and Sexual Activity   • Alcohol use: Yes     Comment: occasional   • Drug use: No   • Sexual activity: Defer     Family History   Problem Relation Age of Onset   • Colon cancer Father    • Melanoma Brother    • Heart disease Other    • Hypertension Other    • Diabetes Other    • Cancer Other    • Stroke Other      Review of Systems   Constitution: Positive for malaise/fatigue. Negative for chills and fever.   HENT: Negative for ear pain, hearing loss, nosebleeds and sore throat.    Eyes: Negative for double vision, pain, vision loss in left eye and vision loss in right eye.   Cardiovascular: Negative for chest pain, claudication, dyspnea on exertion, irregular heartbeat, leg swelling, near-syncope, orthopnea, palpitations, paroxysmal nocturnal dyspnea and syncope.   Respiratory: Negative for cough, shortness of breath, snoring and wheezing.    Endocrine: Negative for cold intolerance and heat intolerance.   Hematologic/Lymphatic:  "Negative for bleeding problem.   Skin: Negative for color change, itching, rash and unusual hair distribution.   Musculoskeletal: Positive for joint pain. Negative for joint swelling.   Gastrointestinal: Negative for abdominal pain, diarrhea, hematochezia, melena, nausea and vomiting.   Genitourinary: Negative for decreased libido, frequency, hematuria, hesitancy and incomplete emptying.   Neurological: Negative for excessive daytime sleepiness, dizziness, headaches, light-headedness, loss of balance, numbness, paresthesias and seizures.   Psychiatric/Behavioral: Positive for depression.          Objective:     Vitals:    11/07/19 1001 11/07/19 1033   BP: 120/78    BP Location: Left arm    Patient Position: Sitting    Pulse:  61   SpO2:  95%   Weight: 104 kg (229 lb)    Height: 177.8 cm (70\")      Body mass index is 32.86 kg/m².    PHYSICAL EXAM:  Physical Exam   Constitutional: He is oriented to person, place, and time. He appears well-developed and well-nourished. No distress.   HENT:   Head: Normocephalic and atraumatic.   Hearing aids   Eyes: Conjunctivae and EOM are normal. Pupils are equal, round, and reactive to light.   Neck: Neck supple. No JVD present. Carotid bruit is not present.   Cardiovascular: Normal rate, regular rhythm, normal heart sounds and intact distal pulses.   No murmur heard.  Pulses:       Radial pulses are 2+ on the right side, and 2+ on the left side.        Dorsalis pedis pulses are 2+ on the right side, and 2+ on the left side.        Posterior tibial pulses are 2+ on the right side, and 2+ on the left side.   Pulmonary/Chest: Effort normal and breath sounds normal. No accessory muscle usage. No tachypnea. No respiratory distress. He has no decreased breath sounds. He has no wheezes. He has no rhonchi. He has no rales. He exhibits no tenderness.   Left chest permanent pacemaker presence.    Abdominal: Soft. Bowel sounds are normal. He exhibits no distension. There is no splenomegaly or " hepatomegaly. There is no tenderness. There is no rebound and no guarding.   Obese   Musculoskeletal: Normal range of motion. He exhibits no edema.   Neurological: He is alert and oriented to person, place, and time.   Skin: Skin is warm, dry and intact. He is not diaphoretic. No erythema.   Psychiatric: He has a normal mood and affect. His speech is normal and behavior is normal. Judgment and thought content normal. Cognition and memory are normal.   Nursing note and vitals reviewed.        ECG 12 Lead  Date/Time: 11/7/2019 10:05 AM  Performed by: Emerita Ramirez APRN  Authorized by: Emerita Ramirez APRN   Comparison: compared with previous ECG from 10/24/2018  Similar to previous ECG  Comparison to previous ECG: Reviewed current and prior ECG with Dr. Estrada  Rhythm: paced  Rate: normal  BPM: 70  Conduction: non-specific intraventricular conduction delay  QRS axis: left    Clinical impression: abnormal EKG  Comments: Intermittently atrial paced  Indication: SSS with PPM            Assessment:       Diagnosis Plan   1. Sick sinus syndrome (CMS/HCC)     2. History of permanent cardiac pacemaker placement     3. Essential hypertension     4. Hyperlipidemia, unspecified hyperlipidemia type     5. Obesity (BMI 30.0-34.9)     6. Type 2 diabetes mellitus without complication, without long-term current use of insulin (CMS/HCC)     7. Disease of thyroid gland         Plan:     1. Sick sinus syndrome: s/p permament pacemaker implantation.  Reviewed device check today.  Left chest PPM site w/o s/s of infection.   2. Hypertension: On Benazepril 10 mg daily, Metoprolol tartrate  50 mg BID.  Blood pressure is 120/78 and good today.  Recommend he continue his current regimen.  3. Hyperlipidemia: 1/18/2019 lipid panel showed improvement of his LDL to 60, VLDL was high at 57.4, HDL was 40, triglycerides elevated to 87. Statin therapy managed by PCP  4. Obesity: We discussed healthy diet and weight loss.  He is going to  continue to walk with his wife and tried to increase his activity a little bit.             5. Diabetes: 1/2019 hgbA1c 6.89%. On oral therapy per PCP             6. Renal insufficiency: 5/14/2019 CMP showed normal renal function, electrolytes and LFTs             7. 6/12/19 with a rate of 187 beats per minute lasting 9 beats and appeared to be one-to-one conduction.  Continue to monitor on device checks  I advised on the importance of good blood pressure, blood sugar and cholesterol control, as well as avoidance of tobacco and regular exercise and weight loss for cardiovascular disease risk factor modification.       He is doing well from a cardiac standpoint.  No changes.  We will continue with routine device checks.  Follow up with Dr. Bazan in 6 months, unless otherwise needed sooner.  I advised the patient to contact our office with any questions or concerns.      Emerita Ramirez, APRN  11/07/2019       Your medication list           Accurate as of 11/7/19 10:37 AM. If you have any questions, ask your nurse or doctor.               CONTINUE taking these medications      Instructions Last Dose Given Next Dose Due   amLODIPine 5 MG tablet  Commonly known as:  NORVASC           aspirin 81 MG chewable tablet      Chew 81 mg daily.       benazepril 10 MG tablet  Commonly known as:  LOTENSIN      Take 1 tablet by mouth Daily.       levothyroxine 175 MCG tablet  Commonly known as:  SYNTHROID, LEVOTHROID      Take 1 tablet by mouth Daily.       metFORMIN 500 MG tablet  Commonly known as:  GLUCOPHAGE      Take 1 tablet by mouth Daily With Breakfast.       metoprolol tartrate 50 MG tablet  Commonly known as:  LOPRESSOR      TAKE ONE TABLET BY MOUTH TWICE A DAY       potassium chloride 20 MEQ packet  Commonly known as:  KLOR-CON      Take 20 mEq by mouth 2 (Two) Times a Day.       rosuvastatin 10 MG tablet  Commonly known as:  CRESTOR      Take 1 tablet by mouth Daily.       sertraline 100 MG tablet  Commonly known as:   ZOLOFT      Take 1.5 tablets by mouth Daily.       VITAMIN D PO      Take  by mouth.              The above medication changes may not have been made by this provider.  Medication list was updated to reflect medications patient is currently taking including medication changes and discontinuations made by other healthcare providers.     It has been a pleasure to participate in this patient's care. Please feel free to contact me with any questions or concerns.           Dictated utilizing Dragon Dictation System.

## 2019-12-10 ENCOUNTER — CLINICAL SUPPORT (OUTPATIENT)
Dept: ORTHOPEDIC SURGERY | Facility: CLINIC | Age: 69
End: 2019-12-10

## 2019-12-10 VITALS — WEIGHT: 231.8 LBS | HEIGHT: 70 IN | BODY MASS INDEX: 33.18 KG/M2 | TEMPERATURE: 97.8 F

## 2019-12-10 DIAGNOSIS — M70.61 TROCHANTERIC BURSITIS OF RIGHT HIP: Primary | ICD-10-CM

## 2019-12-10 PROCEDURE — 20610 DRAIN/INJ JOINT/BURSA W/O US: CPT | Performed by: NURSE PRACTITIONER

## 2019-12-10 RX ORDER — METHYLPREDNISOLONE ACETATE 80 MG/ML
80 INJECTION, SUSPENSION INTRA-ARTICULAR; INTRALESIONAL; INTRAMUSCULAR; SOFT TISSUE
Status: COMPLETED | OUTPATIENT
Start: 2019-12-10 | End: 2019-12-10

## 2019-12-10 RX ADMIN — METHYLPREDNISOLONE ACETATE 80 MG: 80 INJECTION, SUSPENSION INTRA-ARTICULAR; INTRALESIONAL; INTRAMUSCULAR; SOFT TISSUE at 08:10

## 2019-12-10 NOTE — PROGRESS NOTES
12/10/2019    Marco Antonio Kendrick is here today for lateral hip pain. Pt has undergone injection of the hip bursa in the past with good resolution of symptoms. Pt is requesting a repeat injection.     Hip Bursa Injection Procedure Note:    Large Joint Arthrocentesis: R greater trochanteric bursa  Date/Time: 12/10/2019 8:10 AM  Consent given by: patient  Site marked: site marked  Timeout: Immediately prior to procedure a time out was called to verify the correct patient, procedure, equipment, support staff and site/side marked as required   Supporting Documentation  Indications: pain and joint swelling   Procedure Details  Location: hip - R greater trochanteric bursa  Preparation: Patient was prepped and draped in the usual sterile fashion  Needle gauge: 21.  Approach: anterolateral  Medications administered: 2 mL lidocaine (cardiac); 80 mg methylPREDNISolone acetate 80 MG/ML  Patient tolerance: patient tolerated the procedure well with no immediate complications          At the conclusion of the injection I discussed the importance of continued fascial stretching on a daily basis. I will see the patient on a PRN basis or if the symptoms should fail to improve or worsen.    Marivel Chavira, APRN    12/10/2019

## 2020-01-27 RX ORDER — METOPROLOL TARTRATE 50 MG/1
50 TABLET, FILM COATED ORAL 2 TIMES DAILY
Qty: 180 TABLET | Refills: 3 | Status: SHIPPED | OUTPATIENT
Start: 2020-01-27 | End: 2021-10-21

## 2020-02-04 RX ORDER — AMLODIPINE BESYLATE 5 MG/1
TABLET ORAL
Qty: 30 TABLET | Refills: 0 | Status: SHIPPED | OUTPATIENT
Start: 2020-02-04 | End: 2020-02-28 | Stop reason: SDUPTHER

## 2020-02-21 DIAGNOSIS — E11.9 TYPE 2 DIABETES MELLITUS WITHOUT COMPLICATION, WITHOUT LONG-TERM CURRENT USE OF INSULIN (HCC): ICD-10-CM

## 2020-02-21 DIAGNOSIS — E78.5 HYPERLIPIDEMIA, UNSPECIFIED HYPERLIPIDEMIA TYPE: ICD-10-CM

## 2020-02-21 DIAGNOSIS — E07.9 DISEASE OF THYROID GLAND: ICD-10-CM

## 2020-02-21 DIAGNOSIS — Z12.5 ENCOUNTER FOR SCREENING FOR MALIGNANT NEOPLASM OF PROSTATE: ICD-10-CM

## 2020-02-25 ENCOUNTER — TELEPHONE (OUTPATIENT)
Dept: FAMILY MEDICINE CLINIC | Facility: CLINIC | Age: 70
End: 2020-02-25

## 2020-02-25 LAB
ALBUMIN SERPL-MCNC: 3.9 G/DL (ref 3.5–5.2)
ALBUMIN/GLOB SERPL: 1.3 G/DL
ALP SERPL-CCNC: 51 U/L (ref 39–117)
ALT SERPL-CCNC: 18 U/L (ref 1–41)
AST SERPL-CCNC: 14 U/L (ref 1–40)
BASOPHILS # BLD AUTO: 0.11 10*3/MM3 (ref 0–0.2)
BASOPHILS NFR BLD AUTO: 1.7 % (ref 0–1.5)
BILIRUB SERPL-MCNC: 0.4 MG/DL (ref 0.2–1.2)
BUN SERPL-MCNC: 14 MG/DL (ref 8–23)
BUN/CREAT SERPL: 11 (ref 7–25)
CALCIUM SERPL-MCNC: 9.1 MG/DL (ref 8.6–10.5)
CHLORIDE SERPL-SCNC: 99 MMOL/L (ref 98–107)
CHOLEST SERPL-MCNC: 166 MG/DL (ref 0–200)
CO2 SERPL-SCNC: 29.1 MMOL/L (ref 22–29)
CREAT SERPL-MCNC: 1.27 MG/DL (ref 0.76–1.27)
EOSINOPHIL # BLD AUTO: 0.12 10*3/MM3 (ref 0–0.4)
EOSINOPHIL NFR BLD AUTO: 1.9 % (ref 0.3–6.2)
ERYTHROCYTE [DISTWIDTH] IN BLOOD BY AUTOMATED COUNT: 13.3 % (ref 12.3–15.4)
GLOBULIN SER CALC-MCNC: 2.9 GM/DL
GLUCOSE SERPL-MCNC: 150 MG/DL (ref 65–99)
HBA1C MFR BLD: 6.9 % (ref 4.8–5.6)
HCT VFR BLD AUTO: 42.4 % (ref 37.5–51)
HDLC SERPL-MCNC: 44 MG/DL (ref 40–60)
HGB BLD-MCNC: 14.2 G/DL (ref 13–17.7)
IMM GRANULOCYTES # BLD AUTO: 0.01 10*3/MM3 (ref 0–0.05)
IMM GRANULOCYTES NFR BLD AUTO: 0.2 % (ref 0–0.5)
LDLC SERPL CALC-MCNC: 89 MG/DL (ref 0–100)
LDLC/HDLC SERPL: 2.01 {RATIO}
LYMPHOCYTES # BLD AUTO: 3.02 10*3/MM3 (ref 0.7–3.1)
LYMPHOCYTES NFR BLD AUTO: 47.8 % (ref 19.6–45.3)
MCH RBC QN AUTO: 30.1 PG (ref 26.6–33)
MCHC RBC AUTO-ENTMCNC: 33.5 G/DL (ref 31.5–35.7)
MCV RBC AUTO: 90 FL (ref 79–97)
MICROALBUMIN UR-MCNC: 19.6 UG/ML
MONOCYTES # BLD AUTO: 0.37 10*3/MM3 (ref 0.1–0.9)
MONOCYTES NFR BLD AUTO: 5.9 % (ref 5–12)
NEUTROPHILS # BLD AUTO: 2.69 10*3/MM3 (ref 1.7–7)
NEUTROPHILS NFR BLD AUTO: 42.5 % (ref 42.7–76)
NRBC BLD AUTO-RTO: 0.2 /100 WBC (ref 0–0.2)
PLATELET # BLD AUTO: 201 10*3/MM3 (ref 140–450)
POTASSIUM SERPL-SCNC: 4.4 MMOL/L (ref 3.5–5.2)
PROT SERPL-MCNC: 6.8 G/DL (ref 6–8.5)
PSA SERPL-MCNC: 3.48 NG/ML (ref 0–4)
RBC # BLD AUTO: 4.71 10*6/MM3 (ref 4.14–5.8)
SODIUM SERPL-SCNC: 137 MMOL/L (ref 136–145)
TRIGL SERPL-MCNC: 167 MG/DL (ref 0–150)
TSH SERPL DL<=0.005 MIU/L-ACNC: 6.86 UIU/ML (ref 0.27–4.2)
VLDLC SERPL CALC-MCNC: 33.4 MG/DL
WBC # BLD AUTO: 6.32 10*3/MM3 (ref 3.4–10.8)

## 2020-02-25 NOTE — TELEPHONE ENCOUNTER
amLODIPine (NORVASC) 5 MG tablet          benazepril (LOTENSIN) 10 MG tablet     Cholecalciferol (VITAMIN D PO)     levothyroxine (SYNTHROID, LEVOTHROID) 175 MCG tablet     metFORMIN (GLUCOPHAGE) 500 MG tablet     metoprolol tartrate (LOPRESSOR) 50 MG tablet          rosuvastatin (CRESTOR) 10 MG tablet     sertraline (ZOLOFT) 100 MG tablet      [facility] - [facility] [address] - [phone] [facility] [phone] FX [[id number]]    NEGRITO

## 2020-02-28 ENCOUNTER — OFFICE VISIT (OUTPATIENT)
Dept: FAMILY MEDICINE CLINIC | Facility: CLINIC | Age: 70
End: 2020-02-28

## 2020-02-28 VITALS
HEIGHT: 70 IN | BODY MASS INDEX: 33.93 KG/M2 | TEMPERATURE: 97.6 F | RESPIRATION RATE: 16 BRPM | HEART RATE: 89 BPM | WEIGHT: 237 LBS | SYSTOLIC BLOOD PRESSURE: 132 MMHG | OXYGEN SATURATION: 97 % | DIASTOLIC BLOOD PRESSURE: 96 MMHG

## 2020-02-28 DIAGNOSIS — C18.7 CANCER OF SIGMOID COLON (HCC): ICD-10-CM

## 2020-02-28 DIAGNOSIS — E11.9 TYPE 2 DIABETES MELLITUS WITHOUT COMPLICATION, WITHOUT LONG-TERM CURRENT USE OF INSULIN (HCC): ICD-10-CM

## 2020-02-28 DIAGNOSIS — E07.9 DISEASE OF THYROID GLAND: ICD-10-CM

## 2020-02-28 DIAGNOSIS — E78.5 HYPERLIPIDEMIA, UNSPECIFIED HYPERLIPIDEMIA TYPE: ICD-10-CM

## 2020-02-28 DIAGNOSIS — E66.9 OBESITY (BMI 30.0-34.9): ICD-10-CM

## 2020-02-28 DIAGNOSIS — I10 ESSENTIAL HYPERTENSION: Primary | ICD-10-CM

## 2020-02-28 DIAGNOSIS — Z79.01 LONG TERM CURRENT USE OF ANTICOAGULANT THERAPY: ICD-10-CM

## 2020-02-28 PROCEDURE — 99214 OFFICE O/P EST MOD 30 MIN: CPT | Performed by: INTERNAL MEDICINE

## 2020-02-28 PROCEDURE — 90732 PPSV23 VACC 2 YRS+ SUBQ/IM: CPT | Performed by: INTERNAL MEDICINE

## 2020-02-28 PROCEDURE — G0009 ADMIN PNEUMOCOCCAL VACCINE: HCPCS | Performed by: INTERNAL MEDICINE

## 2020-02-28 RX ORDER — SERTRALINE HYDROCHLORIDE 100 MG/1
150 TABLET, FILM COATED ORAL DAILY
Qty: 135 TABLET | Refills: 3 | Status: SHIPPED | OUTPATIENT
Start: 2020-02-28 | End: 2020-04-07

## 2020-02-28 RX ORDER — AMLODIPINE BESYLATE 5 MG/1
5 TABLET ORAL DAILY
Qty: 90 TABLET | Refills: 3 | Status: SHIPPED | OUTPATIENT
Start: 2020-02-28 | End: 2021-07-02 | Stop reason: SDUPTHER

## 2020-02-28 RX ORDER — LEVOTHYROXINE SODIUM 175 UG/1
175 TABLET ORAL DAILY
Qty: 90 TABLET | Refills: 3 | Status: SHIPPED | OUTPATIENT
Start: 2020-02-28 | End: 2020-03-16

## 2020-02-28 RX ORDER — ROSUVASTATIN CALCIUM 10 MG/1
10 TABLET, COATED ORAL DAILY
Qty: 90 TABLET | Refills: 3 | Status: SHIPPED | OUTPATIENT
Start: 2020-02-28 | End: 2020-03-09

## 2020-02-28 RX ORDER — BENAZEPRIL HYDROCHLORIDE 10 MG/1
10 TABLET ORAL DAILY
Qty: 90 TABLET | Refills: 3 | Status: SHIPPED | OUTPATIENT
Start: 2020-02-28 | End: 2021-07-02 | Stop reason: SDUPTHER

## 2020-02-28 NOTE — PROGRESS NOTES
Subjective   Marco Antonio Kendrick is a 69 y.o. male. Patient is here today for follow-up on his hypertension, hyperlipidemia, history of colon cancer, diabetes mellitus type 2 and hypothyroidism.  He is generally been feeling okay and denies any chest pain, shortness of breath, edema or myalgias.  He does have regular follow-up with cardiology and oncology and seems to be doing well.  He does admit to possibly missing his thyroid on occasion.  Chief Complaint   Patient presents with   • Hypertension          Vitals:    02/28/20 1521   BP: 132/96   Pulse: 89   Resp: 16   Temp: 97.6 °F (36.4 °C)   SpO2: 97%     Body mass index is 34.01 kg/m².  The following portions of the patient's history were reviewed and updated as appropriate: allergies, current medications, past family history, past medical history, past social history, past surgical history and problem list.    Past Medical History:   Diagnosis Date   • Arthritis    • Bipolar disorder (CMS/HCC)    • Bladder wall thickening     MILD NON-SPECIFIC URINARY BLADDER....FOLLOWED BY DR COBURN   • Bone marrow suppression     SIGNIFICANT MYELOSUPRESSION FROM FOLFOX-4 CHEMOTHERAPY   • Cancer of sigmoid colon (CMS/HCC)     STAGE III (T3N1M0)   • Depression    • Disease of thyroid gland     HYPOTHYROIDISM   • Diverticula of colon    • Enlarged prostate    • H/O allergy to eggs    • Headache    • Hyperlipidemia    • Hypertension    • Hyperthyroidism    • Obesity    • Pneumonia 11/2015   • PONV (postoperative nausea and vomiting)    • Sick sinus syndrome (CMS/HCC)     S/P LEFT CHEST PACEMAKER IMPLANTATION   • Splenomegaly       Allergies   Allergen Reactions   • Eggs Or Egg-Derived Products Nausea And Vomiting      Social History     Socioeconomic History   • Marital status:      Spouse name: Carmelita   • Number of children: 2   • Years of education: Not on file   • Highest education level: Not on file   Occupational History     Employer: RETIRED   Tobacco Use   • Smoking  status: Never Smoker   • Smokeless tobacco: Never Used   • Tobacco comment: caffeine use   Substance and Sexual Activity   • Alcohol use: Yes     Comment: occasional   • Drug use: No   • Sexual activity: Defer        Current Outpatient Medications:   •  amLODIPine (NORVASC) 5 MG tablet, Take 1 tablet by mouth Daily., Disp: 90 tablet, Rfl: 3  •  aspirin 81 MG chewable tablet, Chew 81 mg daily., Disp: , Rfl:   •  benazepril (LOTENSIN) 10 MG tablet, Take 1 tablet by mouth Daily., Disp: 90 tablet, Rfl: 3  •  Cholecalciferol (VITAMIN D PO), Take  by mouth., Disp: , Rfl:   •  levothyroxine (SYNTHROID, LEVOTHROID) 175 MCG tablet, Take 1 tablet by mouth Daily., Disp: 90 tablet, Rfl: 3  •  metFORMIN (GLUCOPHAGE) 500 MG tablet, Take 1 tablet by mouth Daily With Breakfast., Disp: 90 tablet, Rfl: 3  •  metoprolol tartrate (LOPRESSOR) 50 MG tablet, Take 1 tablet by mouth 2 (Two) Times a Day., Disp: 180 tablet, Rfl: 3  •  potassium chloride (KLOR-CON) 20 MEQ packet, Take 20 mEq by mouth 2 (Two) Times a Day., Disp: 180 packet, Rfl: 3  •  rosuvastatin (CRESTOR) 10 MG tablet, Take 1 tablet by mouth Daily., Disp: 90 tablet, Rfl: 3  •  sertraline (ZOLOFT) 100 MG tablet, Take 1.5 tablets by mouth Daily., Disp: 135 tablet, Rfl: 3     Objective     History of Present Illness     Review of Systems   Constitutional: Negative.    HENT: Negative.    Eyes: Negative.    Respiratory: Negative.    Cardiovascular: Negative.    Gastrointestinal: Negative.    Genitourinary: Negative.    Musculoskeletal: Negative.    Skin: Negative.    Neurological: Negative.    Psychiatric/Behavioral: Negative.        Physical Exam   Constitutional: He is oriented to person, place, and time. He appears well-developed and well-nourished.   Pleasant, cooperative no acute distress, blood pressure 130/80   HENT:   Head: Normocephalic and atraumatic.   Eyes: Pupils are equal, round, and reactive to light. Conjunctivae are normal. No scleral icterus.   Neck: Normal range  of motion. Neck supple.   Cardiovascular: Normal rate, regular rhythm and normal heart sounds.   Pulmonary/Chest: Effort normal and breath sounds normal. No respiratory distress. He has no wheezes. He has no rales.   Musculoskeletal: Normal range of motion. He exhibits no edema.   Neurological: He is alert and oriented to person, place, and time.   Skin: Skin is warm and dry.   Psychiatric: He has a normal mood and affect. His behavior is normal.   Nursing note and vitals reviewed.      ASSESSMENT CBC is completely normal.  CMP had an elevated sugar of 150 and was otherwise essentially normal and hemoglobin A1c is stable at 6.9.  Urine microalbumin was quite acceptable.  PSA was in the normal range.  Lipid panel is controlled with a total cholesterol of 166, HDL 44 and LDL 89.  TSH was elevated at 6.86.  #1-hypertension, controlled  #2-hyperlipidemia, controlled  #3-diabetes mellitus type 2, overall adequate control  #4-hypothyroidism, may be slightly undertreated or possibly the patient's missed medicines  #5-history of colon cancer, asymptomatic     Problem List Items Addressed This Visit        Cardiovascular and Mediastinum    BP (high blood pressure) - Primary    Hyperlipidemia       Digestive    Cancer of sigmoid colon (CMS/HCC)       Endocrine    Disease of thyroid gland    Type 2 diabetes mellitus without complication (CMS/HCC)       Other    Long-term (current) use of anticoagulants    Obesity (BMI 30.0-34.9)          PLAN the patient will continue current medicines as now and he will try and make sure he gets the thyroid in regularly every day.  I would like to recheck him in 3 months with a CMP, hemoglobin A1c, TSH and free T4    There are no Patient Instructions on file for this visit.  Return in about 3 months (around 5/28/2020) for with labs.

## 2020-03-09 RX ORDER — ROSUVASTATIN CALCIUM 10 MG/1
TABLET, COATED ORAL
Qty: 90 TABLET | Refills: 2 | Status: SHIPPED | OUTPATIENT
Start: 2020-03-09 | End: 2021-07-02 | Stop reason: SDUPTHER

## 2020-03-10 ENCOUNTER — CLINICAL SUPPORT (OUTPATIENT)
Dept: ORTHOPEDIC SURGERY | Facility: CLINIC | Age: 70
End: 2020-03-10

## 2020-03-10 VITALS — HEIGHT: 71 IN | TEMPERATURE: 98.3 F | WEIGHT: 243.4 LBS | BODY MASS INDEX: 34.07 KG/M2

## 2020-03-10 DIAGNOSIS — M70.61 TROCHANTERIC BURSITIS OF RIGHT HIP: Primary | ICD-10-CM

## 2020-03-10 PROCEDURE — 20610 DRAIN/INJ JOINT/BURSA W/O US: CPT | Performed by: NURSE PRACTITIONER

## 2020-03-10 RX ORDER — METHYLPREDNISOLONE ACETATE 80 MG/ML
80 INJECTION, SUSPENSION INTRA-ARTICULAR; INTRALESIONAL; INTRAMUSCULAR; SOFT TISSUE
Status: COMPLETED | OUTPATIENT
Start: 2020-03-10 | End: 2020-03-10

## 2020-03-10 RX ADMIN — METHYLPREDNISOLONE ACETATE 80 MG: 80 INJECTION, SUSPENSION INTRA-ARTICULAR; INTRALESIONAL; INTRAMUSCULAR; SOFT TISSUE at 16:02

## 2020-03-10 NOTE — PROGRESS NOTES
3/10/2020    Marco Antonio Kendrick is here today for lateral hip pain. Pt has undergone injection of the hip bursa in the past with good resolution of symptoms. Pt is requesting a repeat injection.     Hip Bursa Injection Procedure Note:    Large Joint Arthrocentesis: R greater trochanteric bursa  Date/Time: 3/10/2020 4:02 PM  Consent given by: patient  Site marked: site marked  Timeout: Immediately prior to procedure a time out was called to verify the correct patient, procedure, equipment, support staff and site/side marked as required   Supporting Documentation  Indications: pain   Procedure Details  Location: hip - R greater trochanteric bursa  Preparation: Patient was prepped and draped in the usual sterile fashion  Needle size: 18 G  Approach: lateral  Medications administered: 80 mg methylPREDNISolone acetate 80 MG/ML; 2 mL lidocaine (cardiac)            At the conclusion of the injection I discussed the importance of continued fascial stretching on a daily basis. I will see the patient on a PRN basis or if the symptoms should fail to improve or worsen.    Marivel Chavira, APRN  3/10/2020

## 2020-03-16 RX ORDER — LEVOTHYROXINE SODIUM 175 UG/1
TABLET ORAL
Qty: 90 TABLET | Refills: 2 | Status: SHIPPED | OUTPATIENT
Start: 2020-03-16 | End: 2021-07-02 | Stop reason: SDUPTHER

## 2020-04-07 ENCOUNTER — DOCUMENTATION (OUTPATIENT)
Dept: PHYSICAL THERAPY | Facility: CLINIC | Age: 70
End: 2020-04-07

## 2020-04-07 DIAGNOSIS — M25.551 RIGHT HIP PAIN: Primary | ICD-10-CM

## 2020-04-07 RX ORDER — SERTRALINE HYDROCHLORIDE 100 MG/1
TABLET, FILM COATED ORAL
Qty: 135 TABLET | Refills: 2 | Status: SHIPPED | OUTPATIENT
Start: 2020-04-07 | End: 2021-02-03

## 2020-04-28 ENCOUNTER — TELEPHONE (OUTPATIENT)
Dept: CARDIOLOGY | Facility: CLINIC | Age: 70
End: 2020-04-28

## 2020-04-28 NOTE — TELEPHONE ENCOUNTER
Pacemaker - Patient has an in-office pacemaker check on 5/7/2020. Can this be converted to a remote check please. Thanks.

## 2020-05-12 ENCOUNTER — OFFICE VISIT (OUTPATIENT)
Dept: CARDIOLOGY | Facility: CLINIC | Age: 70
End: 2020-05-12

## 2020-05-12 VITALS
HEIGHT: 71 IN | DIASTOLIC BLOOD PRESSURE: 82 MMHG | HEART RATE: 90 BPM | BODY MASS INDEX: 33.4 KG/M2 | WEIGHT: 238.6 LBS | OXYGEN SATURATION: 98 % | SYSTOLIC BLOOD PRESSURE: 128 MMHG

## 2020-05-12 DIAGNOSIS — I49.5 SICK SINUS SYNDROME (HCC): Primary | ICD-10-CM

## 2020-05-12 DIAGNOSIS — I10 ESSENTIAL HYPERTENSION: ICD-10-CM

## 2020-05-12 PROCEDURE — 99213 OFFICE O/P EST LOW 20 MIN: CPT | Performed by: INTERNAL MEDICINE

## 2020-05-12 PROCEDURE — 93000 ELECTROCARDIOGRAM COMPLETE: CPT | Performed by: INTERNAL MEDICINE

## 2020-05-12 NOTE — PROGRESS NOTES
Date of Office Visit: 2020    Patient Name: Marco Antonio Kendrick  : 1950    Encounter Provider: Taz Bazan MD  Referring Provider: No ref. provider found  Place of Service: [facility] CARDIOLOGY  Patient Care Team:  Henry Duenas MD as PCP - General (Internal Medicine)  Emerita Ramirez APRN as PCP - Claims Attributed  Leonel Cage MD as Consulting Physician (Gastroenterology)  Leonel Cage MD as Referring Physician (Gastroenterology)  Kev Latham II, MD as Consulting Physician (Hematology and Oncology)      Chief Complaint   Patient presents with   • Irregular Heart Beat     History of Present Illness    The patient is a 69-year-old white male with a history of sick sinus syndrome.  He is status post permanent pacemaker implant.  Patient returns to the office today for follow-up.  His device was checked recently with no recurrence of any atrial fibrillation.  His device is working well.  He reports his blood pressures been under good control.  Is not having any cardiac issues that he is aware of.    Past Medical History:   Diagnosis Date   • Arthritis    • Bipolar disorder (CMS/HCC)    • Bladder wall thickening     MILD NON-SPECIFIC URINARY BLADDER....FOLLOWED BY DR COBURN   • Bone marrow suppression     SIGNIFICANT MYELOSUPRESSION FROM FOLFOX-4 CHEMOTHERAPY   • Cancer of sigmoid colon (CMS/HCC)     STAGE III (T3N1M0)   • Depression    • Disease of thyroid gland     HYPOTHYROIDISM   • Diverticula of colon    • Enlarged prostate    • H/O allergy to eggs    • Headache    • Hyperlipidemia    • Hypertension    • Hyperthyroidism    • Obesity    • Pneumonia 2015   • PONV (postoperative nausea and vomiting)    • Sick sinus syndrome (CMS/HCC)     S/P LEFT CHEST PACEMAKER IMPLANTATION   • Splenomegaly          Past Surgical History:   Procedure Laterality Date   • APPENDECTOMY     • COLON SURGERY  2011    RESECTION   • COLONOSCOPY   01/2015    DR. CAGE   • COLONOSCOPY N/A 7/25/2016    Procedure: COLONOSCOPY to cecum and ti;  Surgeon: Leonel Cage MD;  Location: [facility] ENDOSCOPY;  Service:    • CYSTOSCOPY      BY DR COBURN   • PACEMAKER IMPLANTATION Left            Current Outpatient Medications:   •  amLODIPine (NORVASC) 5 MG tablet, Take 1 tablet by mouth Daily., Disp: 90 tablet, Rfl: 3  •  aspirin 81 MG chewable tablet, Chew 81 mg daily., Disp: , Rfl:   •  benazepril (LOTENSIN) 10 MG tablet, Take 1 tablet by mouth Daily., Disp: 90 tablet, Rfl: 3  •  Cholecalciferol (VITAMIN D PO), Take  by mouth., Disp: , Rfl:   •  levothyroxine (SYNTHROID, LEVOTHROID) 175 MCG tablet, TAKE ONE TABLET BY MOUTH DAILY, Disp: 90 tablet, Rfl: 2  •  metFORMIN (GLUCOPHAGE) 500 MG tablet, TAKE ONE TABLET BY MOUTH EVERY MORNING WITH BREAKFAST, Disp: 90 tablet, Rfl: 2  •  metoprolol tartrate (LOPRESSOR) 50 MG tablet, Take 1 tablet by mouth 2 (Two) Times a Day., Disp: 180 tablet, Rfl: 3  •  potassium chloride (KLOR-CON) 20 MEQ packet, Take 20 mEq by mouth 2 (Two) Times a Day., Disp: 180 packet, Rfl: 3  •  rosuvastatin (CRESTOR) 10 MG tablet, TAKE ONE TABLET BY MOUTH DAILY, Disp: 90 tablet, Rfl: 2  •  sertraline (ZOLOFT) 100 MG tablet, TAKE 1 AND 1/2 TABLETS BY MOUTH DAILY, Disp: 135 tablet, Rfl: 2      Social History     Socioeconomic History   • Marital status:      Spouse name: Carmelita   • Number of children: 2   • Years of education: Not on file   • Highest education level: Not on file   Occupational History     Employer: RETIRED   Tobacco Use   • Smoking status: Never Smoker   • Smokeless tobacco: Never Used   • Tobacco comment: caffeine use   Substance and Sexual Activity   • Alcohol use: Yes     Comment: occasional   • Drug use: No   • Sexual activity: Defer         Review of Systems   Constitution: Negative.   HENT: Negative.    Eyes: Negative.    Cardiovascular: Negative.    Respiratory: Negative.    Endocrine: Negative.    Skin: Negative.     "  Musculoskeletal: Negative.    Gastrointestinal: Negative.    Neurological: Negative.    Psychiatric/Behavioral: Negative.        Procedures      ECG 12 Lead  Date/Time: 5/12/2020 2:15 PM  Performed by: Taz Bazan MD  Authorized by: Taz Bazan MD   Comparison: compared with previous ECG from 11/7/2019  Comparison to previous ECG: Atrial paced rhythm  Conduction: non-specific intraventricular conduction delay                Objective:    /82 (BP Location: Left arm, Patient Position: Sitting, Cuff Size: Adult)   Pulse 90   Ht 180.3 cm (71\")   Wt 108 kg (238 lb 9.6 oz)   SpO2 98%   BMI 33.28 kg/m²         Physical Exam   Constitutional: He is oriented to person, place, and time. He appears well-developed and well-nourished.   HENT:   Head: Normocephalic.   Eyes: Pupils are equal, round, and reactive to light.   Neck: Normal range of motion. No JVD present. Carotid bruit is not present. No thyromegaly present.   Cardiovascular: Normal rate, regular rhythm, S1 normal, S2 normal, normal heart sounds and intact distal pulses. Exam reveals no gallop and no friction rub.   No murmur heard.  Pulmonary/Chest: Effort normal and breath sounds normal.   Abdominal: Soft. Bowel sounds are normal.   Musculoskeletal: He exhibits no edema.   Neurological: He is alert and oriented to person, place, and time.   Skin: Skin is warm, dry and intact. No erythema.   Psychiatric: He has a normal mood and affect.   Vitals reviewed.          Assessment:       Diagnosis Plan   1. Sick sinus syndrome (CMS/HCC)     2. Essential hypertension         1.  Sick sinus syndrome: Status post pacemaker implant normal function  2.  Hypertension: Controlled  3.  History of hyperlipidemia: On statin therapy.     Plan:   No change in medication.  Follow-up in 1 year    "

## 2020-05-27 DIAGNOSIS — E11.9 TYPE 2 DIABETES MELLITUS WITHOUT COMPLICATION, WITHOUT LONG-TERM CURRENT USE OF INSULIN (HCC): Primary | ICD-10-CM

## 2020-05-27 DIAGNOSIS — E07.9 DISEASE OF THYROID GLAND: ICD-10-CM

## 2020-05-29 LAB
ALBUMIN SERPL-MCNC: 4.3 G/DL (ref 3.5–5.2)
ALBUMIN/GLOB SERPL: 1.7 G/DL
ALP SERPL-CCNC: 60 U/L (ref 39–117)
ALT SERPL-CCNC: 14 U/L (ref 1–41)
AST SERPL-CCNC: 13 U/L (ref 1–40)
BILIRUB SERPL-MCNC: 0.3 MG/DL (ref 0.2–1.2)
BUN SERPL-MCNC: 18 MG/DL (ref 8–23)
BUN/CREAT SERPL: 14.8 (ref 7–25)
CALCIUM SERPL-MCNC: 9.3 MG/DL (ref 8.6–10.5)
CHLORIDE SERPL-SCNC: 103 MMOL/L (ref 98–107)
CO2 SERPL-SCNC: 27.8 MMOL/L (ref 22–29)
CREAT SERPL-MCNC: 1.22 MG/DL (ref 0.76–1.27)
GLOBULIN SER CALC-MCNC: 2.6 GM/DL
GLUCOSE SERPL-MCNC: 120 MG/DL (ref 65–99)
HBA1C MFR BLD: 6.8 % (ref 4.8–5.6)
POTASSIUM SERPL-SCNC: 4.2 MMOL/L (ref 3.5–5.2)
PROT SERPL-MCNC: 6.9 G/DL (ref 6–8.5)
SODIUM SERPL-SCNC: 141 MMOL/L (ref 136–145)
T4 FREE SERPL-MCNC: 1.37 NG/DL (ref 0.93–1.7)
TSH SERPL DL<=0.005 MIU/L-ACNC: 0.09 UIU/ML (ref 0.27–4.2)

## 2020-06-02 ENCOUNTER — OFFICE VISIT (OUTPATIENT)
Dept: FAMILY MEDICINE CLINIC | Facility: CLINIC | Age: 70
End: 2020-06-02

## 2020-06-02 VITALS
HEIGHT: 71 IN | DIASTOLIC BLOOD PRESSURE: 80 MMHG | HEART RATE: 87 BPM | OXYGEN SATURATION: 98 % | WEIGHT: 235 LBS | RESPIRATION RATE: 18 BRPM | SYSTOLIC BLOOD PRESSURE: 120 MMHG | BODY MASS INDEX: 32.9 KG/M2 | TEMPERATURE: 97.1 F

## 2020-06-02 DIAGNOSIS — E78.5 HYPERLIPIDEMIA, UNSPECIFIED HYPERLIPIDEMIA TYPE: ICD-10-CM

## 2020-06-02 DIAGNOSIS — E07.9 DISEASE OF THYROID GLAND: ICD-10-CM

## 2020-06-02 DIAGNOSIS — I10 ESSENTIAL HYPERTENSION: Primary | ICD-10-CM

## 2020-06-02 DIAGNOSIS — E11.9 TYPE 2 DIABETES MELLITUS WITHOUT COMPLICATION, WITHOUT LONG-TERM CURRENT USE OF INSULIN (HCC): ICD-10-CM

## 2020-06-02 PROCEDURE — 99214 OFFICE O/P EST MOD 30 MIN: CPT | Performed by: INTERNAL MEDICINE

## 2020-06-02 NOTE — PROGRESS NOTES
Subjective   Marco Antonio Kendrick is a 69 y.o. male. Patient is here today for follow-up on his hypertension, hyperlipidemia, diabetes mellitus type 2 and hypothyroidism.  He is generally been feeling well.  At the last visit his TSH was elevated and I asked him to be sure he is getting his thyroid dose regularly which she has been doing.  He feels well.  Chief Complaint   Patient presents with   • Hypertension   • Hypothyroidism   • Diabetes   • Hyperlipidemia          Vitals:    06/02/20 0916   BP: 120/80   Pulse: 87   Resp: 18   Temp: 97.1 °F (36.2 °C)   SpO2: 98%     Body mass index is 32.78 kg/m².  The following portions of the patient's history were reviewed and updated as appropriate: allergies, current medications, past family history, past medical history, past social history, past surgical history and problem list.    Past Medical History:   Diagnosis Date   • Arthritis    • Bipolar disorder (CMS/HCC)    • Bladder wall thickening     MILD NON-SPECIFIC URINARY BLADDER....FOLLOWED BY DR COBURN   • Bone marrow suppression     SIGNIFICANT MYELOSUPRESSION FROM FOLFOX-4 CHEMOTHERAPY   • Cancer of sigmoid colon (CMS/HCC)     STAGE III (T3N1M0)   • Depression    • Disease of thyroid gland     HYPOTHYROIDISM   • Diverticula of colon    • Enlarged prostate    • H/O allergy to eggs    • Headache    • Hyperlipidemia    • Hypertension    • Hyperthyroidism    • Obesity    • Pneumonia 11/2015   • PONV (postoperative nausea and vomiting)    • Sick sinus syndrome (CMS/HCC)     S/P LEFT CHEST PACEMAKER IMPLANTATION   • Splenomegaly       Allergies   Allergen Reactions   • Eggs Or Egg-Derived Products Nausea And Vomiting      Social History     Socioeconomic History   • Marital status:      Spouse name: Carmelita   • Number of children: 2   • Years of education: Not on file   • Highest education level: Not on file   Occupational History     Employer: RETIRED   Tobacco Use   • Smoking status: Never Smoker   • Smokeless  tobacco: Never Used   • Tobacco comment: caffeine use   Substance and Sexual Activity   • Alcohol use: Yes     Comment: occasional   • Drug use: No   • Sexual activity: Defer        Current Outpatient Medications:   •  amLODIPine (NORVASC) 5 MG tablet, Take 1 tablet by mouth Daily., Disp: 90 tablet, Rfl: 3  •  aspirin 81 MG chewable tablet, Chew 81 mg daily., Disp: , Rfl:   •  benazepril (LOTENSIN) 10 MG tablet, Take 1 tablet by mouth Daily., Disp: 90 tablet, Rfl: 3  •  Cholecalciferol (VITAMIN D PO), Take  by mouth., Disp: , Rfl:   •  levothyroxine (SYNTHROID, LEVOTHROID) 175 MCG tablet, TAKE ONE TABLET BY MOUTH DAILY, Disp: 90 tablet, Rfl: 2  •  metFORMIN (GLUCOPHAGE) 500 MG tablet, TAKE ONE TABLET BY MOUTH EVERY MORNING WITH BREAKFAST, Disp: 90 tablet, Rfl: 2  •  metoprolol tartrate (LOPRESSOR) 50 MG tablet, Take 1 tablet by mouth 2 (Two) Times a Day., Disp: 180 tablet, Rfl: 3  •  potassium chloride (KLOR-CON) 20 MEQ packet, Take 20 mEq by mouth 2 (Two) Times a Day., Disp: 180 packet, Rfl: 3  •  rosuvastatin (CRESTOR) 10 MG tablet, TAKE ONE TABLET BY MOUTH DAILY, Disp: 90 tablet, Rfl: 2  •  sertraline (ZOLOFT) 100 MG tablet, TAKE 1 AND 1/2 TABLETS BY MOUTH DAILY, Disp: 135 tablet, Rfl: 2     Objective     History of Present Illness     Review of Systems   Constitutional: Negative.    HENT: Negative.    Respiratory: Negative.    Cardiovascular: Negative.    Gastrointestinal: Negative.    Genitourinary: Negative.    Musculoskeletal: Negative.    Skin: Negative.    Neurological: Negative.    Psychiatric/Behavioral: Negative.        Physical Exam   Constitutional: He is oriented to person, place, and time. He appears well-developed and well-nourished.   Pleasant, cooperative no distress   HENT:   Head: Normocephalic and atraumatic.   Eyes: Conjunctivae are normal. No scleral icterus.   Neck: Normal range of motion. Neck supple.   Cardiovascular: Normal rate, regular rhythm and normal heart sounds.   Pulmonary/Chest:  Effort normal and breath sounds normal. No respiratory distress. He has no wheezes. He has no rales.   Musculoskeletal: Normal range of motion. He exhibits no edema.   Neurological: He is alert and oriented to person, place, and time.   Skin: Skin is warm and dry.   Psychiatric: He has a normal mood and affect. His behavior is normal.   Nursing note and vitals reviewed.      ASSESSMENT CMP has a sugar of 120 and is otherwise normal and hemoglobin A1c is stable at 6.8.  TSH which was elevated is now just minimally low at 0.092 and free T4 is quite normal at 1.37.  #1-hypertension controlled  #2-hyperlipidemia controlled  #3-diabetes mellitus type 2, adequate control  #4-hypothyroidism, euthyroid on replacement     Problem List Items Addressed This Visit        Cardiovascular and Mediastinum    BP (high blood pressure) - Primary    Hyperlipidemia       Endocrine    Disease of thyroid gland    Type 2 diabetes mellitus without complication (CMS/[facility])          PLAN the patient will continue current medicines as now and I would like to recheck him in 6 months with a CMP, lipid panel, hemoglobin A1c, urine microalbumin, TSH and free T4    There are no Patient Instructions on file for this visit.  Return in about 6 months (around 12/2/2020) for with labs.

## 2020-06-09 ENCOUNTER — CLINICAL SUPPORT (OUTPATIENT)
Dept: ORTHOPEDIC SURGERY | Facility: CLINIC | Age: 70
End: 2020-06-09

## 2020-06-09 VITALS — BODY MASS INDEX: 34.3 KG/M2 | HEIGHT: 70 IN | WEIGHT: 239.6 LBS | TEMPERATURE: 98.3 F

## 2020-06-09 DIAGNOSIS — M70.61 TROCHANTERIC BURSITIS OF RIGHT HIP: Primary | ICD-10-CM

## 2020-06-09 PROCEDURE — 99213 OFFICE O/P EST LOW 20 MIN: CPT | Performed by: NURSE PRACTITIONER

## 2020-06-09 PROCEDURE — 20610 DRAIN/INJ JOINT/BURSA W/O US: CPT | Performed by: NURSE PRACTITIONER

## 2020-06-09 RX ORDER — METHYLPREDNISOLONE ACETATE 80 MG/ML
80 INJECTION, SUSPENSION INTRA-ARTICULAR; INTRALESIONAL; INTRAMUSCULAR; SOFT TISSUE
Status: COMPLETED | OUTPATIENT
Start: 2020-06-09 | End: 2020-06-09

## 2020-06-09 RX ADMIN — METHYLPREDNISOLONE ACETATE 80 MG: 80 INJECTION, SUSPENSION INTRA-ARTICULAR; INTRALESIONAL; INTRAMUSCULAR; SOFT TISSUE at 08:10

## 2020-06-09 NOTE — PROGRESS NOTES
Patient: Marco Antonio Kendrick  YOB: 1950 69 y.o. male  Medical Record Number: [id number]    Chief Complaints:   Chief Complaint   Patient presents with   • Right Hip - Follow-up, Pain       History of Present Illness:Marco Antonio Kendrick is a 69 y.o. male who presents with complaints of right lateral hip pain.  He denies any injury.  Reports he has had soreness off and on for the last month.  Describes the hip soreness as a moderate constant ache worse at night when he lies on his side, better with change in position.    Allergies:   Allergies   Allergen Reactions   • Eggs Or Egg-Derived Products Nausea And Vomiting       Medications:   Current Outpatient Medications   Medication Sig Dispense Refill   • amLODIPine (NORVASC) 5 MG tablet Take 1 tablet by mouth Daily. 90 tablet 3   • aspirin 81 MG chewable tablet Chew 81 mg daily.     • benazepril (LOTENSIN) 10 MG tablet Take 1 tablet by mouth Daily. 90 tablet 3   • Cholecalciferol (VITAMIN D PO) Take  by mouth.     • levothyroxine (SYNTHROID, LEVOTHROID) 175 MCG tablet TAKE ONE TABLET BY MOUTH DAILY 90 tablet 2   • metFORMIN (GLUCOPHAGE) 500 MG tablet TAKE ONE TABLET BY MOUTH EVERY MORNING WITH BREAKFAST 90 tablet 2   • metoprolol tartrate (LOPRESSOR) 50 MG tablet Take 1 tablet by mouth 2 (Two) Times a Day. 180 tablet 3   • potassium chloride (KLOR-CON) 20 MEQ packet Take 20 mEq by mouth 2 (Two) Times a Day. 180 packet 3   • rosuvastatin (CRESTOR) 10 MG tablet TAKE ONE TABLET BY MOUTH DAILY 90 tablet 2   • sertraline (ZOLOFT) 100 MG tablet TAKE 1 AND 1/2 TABLETS BY MOUTH DAILY 135 tablet 2     No current facility-administered medications for this visit.          The following portions of the patient's history were reviewed and updated as appropriate: allergies, current medications, past family history, past medical history, past social history, past surgical history and problem list.    Review of Systems:   A 14 point review of systems was performed. All  "systems negative except pertinent positives/negative listed in HPI above    Physical Exam:   Vitals:    06/09/20 1107   Temp: 98.3 °F (36.8 °C)   Weight: 109 kg (239 lb 9.6 oz)   Height: 177.8 cm (70\")   PainSc:   2       General: A and O x 3, ASA, NAD    SCLERA:    Normal    DENTITION:   Normal  Skin clear no unusual lesions noted  Right hip the patient is tender over right hip greater trochanteric bursa otherwise has normal range of motion    Radiology:  Xrays right hip were reviewed show minimal arthritic changes     assessment/Plan:  Right hip greater trochanteric bursitis    Patient discussed treatment options.  He would like to proceed with right hip bursa injection.  Prior to injection risks were discussed including pain and infection.  Patient verbalized understanding would like to proceed he will continue with routine stretching and we will see him back as needed    Large Joint Arthrocentesis: R greater trochanteric bursa  Date/Time: 6/9/2020 8:10 AM  Consent given by: patient  Site marked: site marked  Timeout: Immediately prior to procedure a time out was called to verify the correct patient, procedure, equipment, support staff and site/side marked as required   Supporting Documentation  Indications: pain   Procedure Details  Location: hip - R greater trochanteric bursa  Preparation: Patient was prepped and draped in the usual sterile fashion  Needle gauge: 21g.  Approach: lateral  Medications administered: 2 mL lidocaine (cardiac); 80 mg methylPREDNISolone acetate 80 MG/ML  Patient tolerance: patient tolerated the procedure well with no immediate complications          "

## 2021-01-05 ENCOUNTER — OFFICE VISIT (OUTPATIENT)
Dept: FAMILY MEDICINE CLINIC | Facility: CLINIC | Age: 71
End: 2021-01-05

## 2021-01-05 DIAGNOSIS — Z86.16 HISTORY OF COVID-19: ICD-10-CM

## 2021-01-05 DIAGNOSIS — R05.9 COUGH: Primary | ICD-10-CM

## 2021-01-05 PROCEDURE — 99442 PR PHYS/QHP TELEPHONE EVALUATION 11-20 MIN: CPT | Performed by: NURSE PRACTITIONER

## 2021-01-05 RX ORDER — BENZONATATE 100 MG/1
100 CAPSULE ORAL 3 TIMES DAILY PRN
Qty: 30 CAPSULE | Refills: 0 | Status: SHIPPED | OUTPATIENT
Start: 2021-01-05 | End: 2021-09-24

## 2021-01-05 NOTE — PATIENT INSTRUCTIONS
Discussed dx and symptoms of Covid19. Pt to drink plenty of fluids, eat a healthy diet and rest. If start having SOA or fever goes above 102.0 and not responding to fever reducer should be seen by medical provider for further evaluation. If additional symptoms develop call office for further discussion. Pt verb. Understanding.   COVID-19  COVID-19 is a respiratory infection that is caused by a virus called severe acute respiratory syndrome coronavirus 2 (SARS-CoV-2). The disease is also known as coronavirus disease or novel coronavirus. In some people, the virus may not cause any symptoms. In others, it may cause a serious infection. The infection can get worse quickly and can lead to complications, such as:  · Pneumonia, or infection of the lungs.  · Acute respiratory distress syndrome or ARDS. This is a condition in which fluid build-up in the lungs prevents the lungs from filling with air and passing oxygen into the blood.  · Acute respiratory failure. This is a condition in which there is not enough oxygen passing from the lungs to the body or when carbon dioxide is not passing from the lungs out of the body.  · Sepsis or septic shock. This is a serious bodily reaction to an infection.  · Blood clotting problems.  · Secondary infections due to bacteria or fungus.  · Organ failure. This is when your body's organs stop working.  The virus that causes COVID-19 is contagious. This means that it can spread from person to person through droplets from coughs and sneezes (respiratory secretions).  What are the causes?  This illness is caused by a virus. You may catch the virus by:  · Breathing in droplets from an infected person. Droplets can be spread by a person breathing, speaking, singing, coughing, or sneezing.  · Touching something, like a table or a doorknob, that was exposed to the virus (contaminated) and then touching your mouth, nose, or eyes.  What increases the risk?  Risk for infection  You are more likely  "to be infected with this virus if you:  · Are within 6 feet (2 meters) of a person with COVID-19.  · Provide care for or live with a person who is infected with COVID-19.  · Spend time in crowded indoor spaces or live in shared housing.  Risk for serious illness  You are more likely to become seriously ill from the virus if you:  · Are 50 years of age or older. The higher your age, the more you are at risk for serious illness.  · Live in a nursing home or long-term care facility.  · Have cancer.  · Have a long-term (chronic) disease such as:  ? Chronic lung disease, including chronic obstructive pulmonary disease or asthma.  ? A long-term disease that lowers your body's ability to fight infection (immunocompromised).  ? Heart disease, including heart failure, a condition in which the arteries that lead to the heart become narrow or blocked (coronary artery disease), a disease which makes the heart muscle thick, weak, or stiff (cardiomyopathy).  ? Diabetes.  ? Chronic kidney disease.  ? Sickle cell disease, a condition in which red blood cells have an abnormal \"sickle\" shape.  ? Liver disease.  · Are obese.  What are the signs or symptoms?  Symptoms of this condition can range from mild to severe. Symptoms may appear any time from 2 to 14 days after being exposed to the virus. They include:  · A fever or chills.  · A cough.  · Difficulty breathing.  · Headaches, body aches, or muscle aches.  · Runny or stuffy (congested) nose.  · A sore throat.  · New loss of taste or smell.  Some people may also have stomach problems, such as nausea, vomiting, or diarrhea.  Other people may not have any symptoms of COVID-19.  How is this diagnosed?  This condition may be diagnosed based on:  · Your signs and symptoms, especially if:  ? You live in an area with a COVID-19 outbreak.  ? You recently traveled to or from an area where the virus is common.  ? You provide care for or live with a person who was diagnosed with " COVID-19.  ? You were exposed to a person who was diagnosed with COVID-19.  · A physical exam.  · Lab tests, which may include:  ? Taking a sample of fluid from the back of your nose and throat (nasopharyngeal fluid), your nose, or your throat using a swab.  ? A sample of mucus from your lungs (sputum).  ? Blood tests.  · Imaging tests, which may include, X-rays, CT scan, or ultrasound.  How is this treated?  At present, there is no medicine to treat COVID-19. Medicines that treat other diseases are being used on a trial basis to see if they are effective against COVID-19.  Your health care provider will talk with you about ways to treat your symptoms. For most people, the infection is mild and can be managed at home with rest, fluids, and over-the-counter medicines.  Treatment for a serious infection usually takes places in a hospital intensive care unit (ICU). It may include one or more of the following treatments. These treatments are given until your symptoms improve.  · Receiving fluids and medicines through an IV.  · Supplemental oxygen. Extra oxygen is given through a tube in the nose, a face mask, or a kauffman.  · Positioning you to lie on your stomach (prone position). This makes it easier for oxygen to get into the lungs.  · Continuous positive airway pressure (CPAP) or bi-level positive airway pressure (BPAP) machine. This treatment uses mild air pressure to keep the airways open. A tube that is connected to a motor delivers oxygen to the body.  · Ventilator. This treatment moves air into and out of the lungs by using a tube that is placed in your windpipe.  · Tracheostomy. This is a procedure to create a hole in the neck so that a breathing tube can be inserted.  · Extracorporeal membrane oxygenation (ECMO). This procedure gives the lungs a chance to recover by taking over the functions of the heart and lungs. It supplies oxygen to the body and removes carbon dioxide.  Follow these instructions at  home:  Lifestyle  · If you are sick, stay home except to get medical care. Your health care provider will tell you how long to stay home. Call your health care provider before you go for medical care.  · Rest at home as told by your health care provider.  · Do not use any products that contain nicotine or tobacco, such as cigarettes, e-cigarettes, and chewing tobacco. If you need help quitting, ask your health care provider.  · Return to your normal activities as told by your health care provider. Ask your health care provider what activities are safe for you.  General instructions  · Take over-the-counter and prescription medicines only as told by your health care provider.  · Drink enough fluid to keep your urine pale yellow.  · Keep all follow-up visits as told by your health care provider. This is important.  How is this prevented?    There is no vaccine to help prevent COVID-19 infection. However, there are steps you can take to protect yourself and others from this virus.  To protect yourself:   · Do not travel to areas where COVID-19 is a risk. The areas where COVID-19 is reported change often. To identify high-risk areas and travel restrictions, check the CDC travel website: wwwnc.cdc.gov/travel/notices  · If you live in, or must travel to, an area where COVID-19 is a risk, take precautions to avoid infection.  ? Stay away from people who are sick.  ? Wash your hands often with soap and water for 20 seconds. If soap and water are not available, use an alcohol-based hand .  ? Avoid touching your mouth, face, eyes, or nose.  ? Avoid going out in public, follow guidance from your state and local health authorities.  ? If you must go out in public, wear a cloth face covering or face mask. Make sure your mask covers your nose and mouth.  ? Avoid crowded indoor spaces. Stay at least 6 feet (2 meters) away from others.  ? Disinfect objects and surfaces that are frequently touched every day. This may  include:  § Counters and tables.  § Doorknobs and light switches.  § Sinks and faucets.  § Electronics, such as phones, remote controls, keyboards, computers, and tablets.  To protect others:  If you have symptoms of COVID-19, take steps to prevent the virus from spreading to others.  · If you think you have a COVID-19 infection, contact your health care provider right away. Tell your health care team that you think you may have a COVID-19 infection.  · Stay home. Leave your house only to seek medical care. Do not use public transport.  · Do not travel while you are sick.  · Wash your hands often with soap and water for 20 seconds. If soap and water are not available, use alcohol-based hand .  · Stay away from other members of your household. Let healthy household members care for children and pets, if possible. If you have to care for children or pets, wash your hands often and wear a mask. If possible, stay in your own room, separate from others. Use a different bathroom.  · Make sure that all people in your household wash their hands well and often.  · Cough or sneeze into a tissue or your sleeve or elbow. Do not cough or sneeze into your hand or into the air.  · Wear a cloth face covering or face mask. Make sure your mask covers your nose and mouth.  Where to find more information  · Centers for Disease Control and Prevention: www.cdc.gov/coronavirus/2019-ncov/index.html  · World Health Organization: www.who.int/health-topics/coronavirus  Contact a health care provider if:  · You live in or have traveled to an area where COVID-19 is a risk and you have symptoms of the infection.  · You have had contact with someone who has COVID-19 and you have symptoms of the infection.  Get help right away if:  · You have trouble breathing.  · You have pain or pressure in your chest.  · You have confusion.  · You have bluish lips and fingernails.  · You have difficulty waking from sleep.  · You have symptoms that get  worse.  These symptoms may represent a serious problem that is an emergency. Do not wait to see if the symptoms will go away. Get medical help right away. Call your local emergency services (911 in the [place] Do not drive yourself to the hospital. Let the emergency medical personnel know if you think you have COVID-19.  Summary  · COVID-19 is a respiratory infection that is caused by a virus. It is also known as coronavirus disease or novel coronavirus. It can cause serious infections, such as pneumonia, acute respiratory distress syndrome, acute respiratory failure, or sepsis.  · The virus that causes COVID-19 is contagious. This means that it can spread from person to person through droplets from breathing, speaking, singing, coughing, or sneezing.  · You are more likely to develop a serious illness if you are 50 years of age or older, have a weak immune system, live in a nursing home, or have chronic disease.  · There is no medicine to treat COVID-19. Your health care provider will talk with you about ways to treat your symptoms.  · Take steps to protect yourself and others from infection. Wash your hands often and disinfect objects and surfaces that are frequently touched every day. Stay away from people who are sick and wear a mask if you are sick.  This information is not intended to replace advice given to you by your health care provider. Make sure you discuss any questions you have with your health care provider.  Document Revised: 10/16/2020 Document Reviewed: 01/23/2020  [employer] Education © 2020 [employer]

## 2021-01-05 NOTE — PROGRESS NOTES
Subjective     Marco Antonio Kendrick is a 70 y.o.. male.     This was an audio enabled telemedicine encounter.  You have chosen to receive care through a telephone visit. Do you consent to use a telephone visit for your medical care today? yes    Pt's visit today for c/o cough with low grade fever. Pt stating he was diagnosed with Covid19 last Wednesday (12/30/20). Pt stating he started having cough on Saturday (1/2/21) and fever on Sunday (1/3/21). Pt requesting cough suppressant script.     Cough  Associated symptoms include a fever and rhinorrhea. Pertinent negatives include no shortness of breath.       The following portions of the patient's history were reviewed and updated as appropriate: allergies, current medications, past family history, past medical history, past social history, past surgical history and problem list.    Past Medical History:   Diagnosis Date   • Arthritis    • Bipolar disorder (CMS/HCC)    • Bladder wall thickening     MILD NON-SPECIFIC URINARY BLADDER....FOLLOWED BY DR COBURN   • Bone marrow suppression     SIGNIFICANT MYELOSUPRESSION FROM FOLFOX-4 CHEMOTHERAPY   • Cancer of sigmoid colon (CMS/HCC)     STAGE III (T3N1M0)   • Depression    • Disease of thyroid gland     HYPOTHYROIDISM   • Diverticula of colon    • Enlarged prostate    • H/O allergy to eggs    • Headache    • Hyperlipidemia    • Hypertension    • Hyperthyroidism    • Obesity    • Pneumonia 11/2015   • PONV (postoperative nausea and vomiting)    • Sick sinus syndrome (CMS/HCC)     S/P LEFT CHEST PACEMAKER IMPLANTATION   • Splenomegaly        Past Surgical History:   Procedure Laterality Date   • APPENDECTOMY     • COLON SURGERY  2011    RESECTION   • COLONOSCOPY  01/2015    DR. CAGE   • COLONOSCOPY N/A 7/25/2016    Procedure: COLONOSCOPY to cecum and ti;  Surgeon: Leonel Cage MD;  Location: [facility] ENDOSCOPY;  Service:    • CYSTOSCOPY      BY DR COBURN   • PACEMAKER IMPLANTATION Left        Review of Systems    Constitutional: Positive for fatigue and fever.   HENT: Positive for rhinorrhea.    Respiratory: Positive for cough. Negative for shortness of breath.    Cardiovascular: Negative.    Gastrointestinal: Negative.    Neurological: Negative.        Allergies   Allergen Reactions   • Eggs Or Egg-Derived Products Nausea And Vomiting       Objective     There were no vitals filed for this visit.; telephone visit  There is no height or weight on file to calculate BMI.; telephone visit    Physical Exam; telephone visit      Current Outpatient Medications:   •  amLODIPine (NORVASC) 5 MG tablet, Take 1 tablet by mouth Daily., Disp: 90 tablet, Rfl: 3  •  aspirin 81 MG chewable tablet, Chew 81 mg daily., Disp: , Rfl:   •  benazepril (LOTENSIN) 10 MG tablet, Take 1 tablet by mouth Daily., Disp: 90 tablet, Rfl: 3  •  benzonatate (Tessalon Perles) 100 MG capsule, Take 1 capsule by mouth 3 (Three) Times a Day As Needed for Cough., Disp: 30 capsule, Rfl: 0  •  Cholecalciferol (VITAMIN D PO), Take  by mouth., Disp: , Rfl:   •  levothyroxine (SYNTHROID, LEVOTHROID) 175 MCG tablet, TAKE ONE TABLET BY MOUTH DAILY, Disp: 90 tablet, Rfl: 2  •  metFORMIN (GLUCOPHAGE) 500 MG tablet, TAKE ONE TABLET BY MOUTH EVERY MORNING WITH BREAKFAST, Disp: 90 tablet, Rfl: 2  •  metoprolol tartrate (LOPRESSOR) 50 MG tablet, Take 1 tablet by mouth 2 (Two) Times a Day., Disp: 180 tablet, Rfl: 3  •  potassium chloride (KLOR-CON) 20 MEQ packet, Take 20 mEq by mouth 2 (Two) Times a Day., Disp: 180 packet, Rfl: 3  •  rosuvastatin (CRESTOR) 10 MG tablet, TAKE ONE TABLET BY MOUTH DAILY, Disp: 90 tablet, Rfl: 2  •  sertraline (ZOLOFT) 100 MG tablet, TAKE 1 AND 1/2 TABLETS BY MOUTH DAILY, Disp: 135 tablet, Rfl: 2    Time: 15 minutes    Assessment/Plan   Diagnoses and all orders for this visit:    1. Cough (Primary)  -     benzonatate (Tessalon Perles) 100 MG capsule; Take 1 capsule by mouth 3 (Three) Times a Day As Needed for Cough.  Dispense: 30 capsule; Refill:  0    2. History of COVID-19        Patient Instructions   Discussed dx and symptoms of Covid19. Pt to drink plenty of fluids, eat a healthy diet and rest. If start having SOA or fever goes above 102.0 and not responding to fever reducer should be seen by medical provider for further evaluation. If additional symptoms develop call office for further discussion. Pt verb. Understanding.   COVID-19  COVID-19 is a respiratory infection that is caused by a virus called severe acute respiratory syndrome coronavirus 2 (SARS-CoV-2). The disease is also known as coronavirus disease or novel coronavirus. In some people, the virus may not cause any symptoms. In others, it may cause a serious infection. The infection can get worse quickly and can lead to complications, such as:  · Pneumonia, or infection of the lungs.  · Acute respiratory distress syndrome or ARDS. This is a condition in which fluid build-up in the lungs prevents the lungs from filling with air and passing oxygen into the blood.  · Acute respiratory failure. This is a condition in which there is not enough oxygen passing from the lungs to the body or when carbon dioxide is not passing from the lungs out of the body.  · Sepsis or septic shock. This is a serious bodily reaction to an infection.  · Blood clotting problems.  · Secondary infections due to bacteria or fungus.  · Organ failure. This is when your body's organs stop working.  The virus that causes COVID-19 is contagious. This means that it can spread from person to person through droplets from coughs and sneezes (respiratory secretions).  What are the causes?  This illness is caused by a virus. You may catch the virus by:  · Breathing in droplets from an infected person. Droplets can be spread by a person breathing, speaking, singing, coughing, or sneezing.  · Touching something, like a table or a doorknob, that was exposed to the virus (contaminated) and then touching your mouth, nose, or eyes.  What  "increases the risk?  Risk for infection  You are more likely to be infected with this virus if you:  · Are within 6 feet (2 meters) of a person with COVID-19.  · Provide care for or live with a person who is infected with COVID-19.  · Spend time in crowded indoor spaces or live in shared housing.  Risk for serious illness  You are more likely to become seriously ill from the virus if you:  · Are 50 years of age or older. The higher your age, the more you are at risk for serious illness.  · Live in a nursing home or long-term care facility.  · Have cancer.  · Have a long-term (chronic) disease such as:  ? Chronic lung disease, including chronic obstructive pulmonary disease or asthma.  ? A long-term disease that lowers your body's ability to fight infection (immunocompromised).  ? Heart disease, including heart failure, a condition in which the arteries that lead to the heart become narrow or blocked (coronary artery disease), a disease which makes the heart muscle thick, weak, or stiff (cardiomyopathy).  ? Diabetes.  ? Chronic kidney disease.  ? Sickle cell disease, a condition in which red blood cells have an abnormal \"sickle\" shape.  ? Liver disease.  · Are obese.  What are the signs or symptoms?  Symptoms of this condition can range from mild to severe. Symptoms may appear any time from 2 to 14 days after being exposed to the virus. They include:  · A fever or chills.  · A cough.  · Difficulty breathing.  · Headaches, body aches, or muscle aches.  · Runny or stuffy (congested) nose.  · A sore throat.  · New loss of taste or smell.  Some people may also have stomach problems, such as nausea, vomiting, or diarrhea.  Other people may not have any symptoms of COVID-19.  How is this diagnosed?  This condition may be diagnosed based on:  · Your signs and symptoms, especially if:  ? You live in an area with a COVID-19 outbreak.  ? You recently traveled to or from an area where the virus is common.  ? You provide care " for or live with a person who was diagnosed with COVID-19.  ? You were exposed to a person who was diagnosed with COVID-19.  · A physical exam.  · Lab tests, which may include:  ? Taking a sample of fluid from the back of your nose and throat (nasopharyngeal fluid), your nose, or your throat using a swab.  ? A sample of mucus from your lungs (sputum).  ? Blood tests.  · Imaging tests, which may include, X-rays, CT scan, or ultrasound.  How is this treated?  At present, there is no medicine to treat COVID-19. Medicines that treat other diseases are being used on a trial basis to see if they are effective against COVID-19.  Your health care provider will talk with you about ways to treat your symptoms. For most people, the infection is mild and can be managed at home with rest, fluids, and over-the-counter medicines.  Treatment for a serious infection usually takes places in a hospital intensive care unit (ICU). It may include one or more of the following treatments. These treatments are given until your symptoms improve.  · Receiving fluids and medicines through an IV.  · Supplemental oxygen. Extra oxygen is given through a tube in the nose, a face mask, or a kauffman.  · Positioning you to lie on your stomach (prone position). This makes it easier for oxygen to get into the lungs.  · Continuous positive airway pressure (CPAP) or bi-level positive airway pressure (BPAP) machine. This treatment uses mild air pressure to keep the airways open. A tube that is connected to a motor delivers oxygen to the body.  · Ventilator. This treatment moves air into and out of the lungs by using a tube that is placed in your windpipe.  · Tracheostomy. This is a procedure to create a hole in the neck so that a breathing tube can be inserted.  · Extracorporeal membrane oxygenation (ECMO). This procedure gives the lungs a chance to recover by taking over the functions of the heart and lungs. It supplies oxygen to the body and removes carbon  dioxide.  Follow these instructions at home:  Lifestyle  · If you are sick, stay home except to get medical care. Your health care provider will tell you how long to stay home. Call your health care provider before you go for medical care.  · Rest at home as told by your health care provider.  · Do not use any products that contain nicotine or tobacco, such as cigarettes, e-cigarettes, and chewing tobacco. If you need help quitting, ask your health care provider.  · Return to your normal activities as told by your health care provider. Ask your health care provider what activities are safe for you.  General instructions  · Take over-the-counter and prescription medicines only as told by your health care provider.  · Drink enough fluid to keep your urine pale yellow.  · Keep all follow-up visits as told by your health care provider. This is important.  How is this prevented?    There is no vaccine to help prevent COVID-19 infection. However, there are steps you can take to protect yourself and others from this virus.  To protect yourself:   · Do not travel to areas where COVID-19 is a risk. The areas where COVID-19 is reported change often. To identify high-risk areas and travel restrictions, check the CDC travel website: wwwnc.cdc.gov/travel/notices  · If you live in, or must travel to, an area where COVID-19 is a risk, take precautions to avoid infection.  ? Stay away from people who are sick.  ? Wash your hands often with soap and water for 20 seconds. If soap and water are not available, use an alcohol-based hand .  ? Avoid touching your mouth, face, eyes, or nose.  ? Avoid going out in public, follow guidance from your state and local health authorities.  ? If you must go out in public, wear a cloth face covering or face mask. Make sure your mask covers your nose and mouth.  ? Avoid crowded indoor spaces. Stay at least 6 feet (2 meters) away from others.  ? Disinfect objects and surfaces that are  frequently touched every day. This may include:  § Counters and tables.  § Doorknobs and light switches.  § Sinks and faucets.  § Electronics, such as phones, remote controls, keyboards, computers, and tablets.  To protect others:  If you have symptoms of COVID-19, take steps to prevent the virus from spreading to others.  · If you think you have a COVID-19 infection, contact your health care provider right away. Tell your health care team that you think you may have a COVID-19 infection.  · Stay home. Leave your house only to seek medical care. Do not use public transport.  · Do not travel while you are sick.  · Wash your hands often with soap and water for 20 seconds. If soap and water are not available, use alcohol-based hand .  · Stay away from other members of your household. Let healthy household members care for children and pets, if possible. If you have to care for children or pets, wash your hands often and wear a mask. If possible, stay in your own room, separate from others. Use a different bathroom.  · Make sure that all people in your household wash their hands well and often.  · Cough or sneeze into a tissue or your sleeve or elbow. Do not cough or sneeze into your hand or into the air.  · Wear a cloth face covering or face mask. Make sure your mask covers your nose and mouth.  Where to find more information  · Centers for Disease Control and Prevention: www.cdc.gov/coronavirus/2019-ncov/index.html  · World Health Organization: www.who.int/health-topics/coronavirus  Contact a health care provider if:  · You live in or have traveled to an area where COVID-19 is a risk and you have symptoms of the infection.  · You have had contact with someone who has COVID-19 and you have symptoms of the infection.  Get help right away if:  · You have trouble breathing.  · You have pain or pressure in your chest.  · You have confusion.  · You have bluish lips and fingernails.  · You have difficulty waking  from sleep.  · You have symptoms that get worse.  These symptoms may represent a serious problem that is an emergency. Do not wait to see if the symptoms will go away. Get medical help right away. Call your local emergency services (911 in the [place] Do not drive yourself to the hospital. Let the emergency medical personnel know if you think you have COVID-19.  Summary  · COVID-19 is a respiratory infection that is caused by a virus. It is also known as coronavirus disease or novel coronavirus. It can cause serious infections, such as pneumonia, acute respiratory distress syndrome, acute respiratory failure, or sepsis.  · The virus that causes COVID-19 is contagious. This means that it can spread from person to person through droplets from breathing, speaking, singing, coughing, or sneezing.  · You are more likely to develop a serious illness if you are 50 years of age or older, have a weak immune system, live in a nursing home, or have chronic disease.  · There is no medicine to treat COVID-19. Your health care provider will talk with you about ways to treat your symptoms.  · Take steps to protect yourself and others from infection. Wash your hands often and disinfect objects and surfaces that are frequently touched every day. Stay away from people who are sick and wear a mask if you are sick.  This information is not intended to replace advice given to you by your health care provider. Make sure you discuss any questions you have with your health care provider.  Document Revised: 10/16/2020 Document Reviewed: 01/23/2020  [employer] Patient Education © 2020 [employer]        Return if symptoms worsen or fail to improve.

## 2021-01-11 ENCOUNTER — OFFICE VISIT (OUTPATIENT)
Dept: FAMILY MEDICINE CLINIC | Facility: CLINIC | Age: 71
End: 2021-01-11

## 2021-01-11 DIAGNOSIS — Z71.89 ADVICE GIVEN ABOUT COVID-19 VIRUS INFECTION: ICD-10-CM

## 2021-01-11 DIAGNOSIS — Z86.16 HISTORY OF COVID-19: Primary | ICD-10-CM

## 2021-01-11 PROCEDURE — 99442 PR PHYS/QHP TELEPHONE EVALUATION 11-20 MIN: CPT | Performed by: NURSE PRACTITIONER

## 2021-01-11 NOTE — PROGRESS NOTES
Subjective      Marco Antonio Kendrick is a 70 y.o.. male.     This was an audio enabled telemedicine encounter.  You have chosen to receive care through a telephone visit. Do you consent to use a telephone visit for your medical care today? Yes    Pt dx with covid on 12/30/20 with symptoms of fever, cough, fatigue, and/or rhinorrhea. Pt denies fever for 24 hours, denies cough at this time and denies fatigue at this time. Pt stating he is having congestion and rhinorrhea. Pt stating he is feeling better.       The following portions of the patient's history were reviewed and updated as appropriate: allergies, current medications, past family history, past medical history, past social history, past surgical history and problem list.    Past Medical History:   Diagnosis Date   • Arthritis    • Bipolar disorder (CMS/HCC)    • Bladder wall thickening     MILD NON-SPECIFIC URINARY BLADDER....FOLLOWED BY DR COBURN   • Bone marrow suppression     SIGNIFICANT MYELOSUPRESSION FROM FOLFOX-4 CHEMOTHERAPY   • Cancer of sigmoid colon (CMS/HCC)     STAGE III (T3N1M0)   • Depression    • Disease of thyroid gland     HYPOTHYROIDISM   • Diverticula of colon    • Enlarged prostate    • H/O allergy to eggs    • Headache    • Hyperlipidemia    • Hypertension    • Hyperthyroidism    • Obesity    • Pneumonia 11/2015   • PONV (postoperative nausea and vomiting)    • Sick sinus syndrome (CMS/HCC)     S/P LEFT CHEST PACEMAKER IMPLANTATION   • Splenomegaly        Past Surgical History:   Procedure Laterality Date   • APPENDECTOMY     • COLON SURGERY  2011    RESECTION   • COLONOSCOPY  01/2015    DR. CAGE   • COLONOSCOPY N/A 7/25/2016    Procedure: COLONOSCOPY to cecum and ti;  Surgeon: Leonel Cage MD;  Location: [facility] ENDOSCOPY;  Service:    • CYSTOSCOPY      BY DR COBURN   • PACEMAKER IMPLANTATION Left        Review of Systems   Constitutional: Negative.  Negative for chills and fever (none for past 24 hours).   HENT: Positive for  congestion and rhinorrhea. Negative for ear pain and sore throat.    Respiratory: Negative.  Negative for cough and shortness of breath.    Cardiovascular: Negative.  Negative for chest pain and palpitations.   Gastrointestinal: Negative.  Negative for abdominal pain, diarrhea, nausea and vomiting.   Musculoskeletal: Negative.  Negative for arthralgias.   Neurological: Negative.  Negative for dizziness, light-headedness and headaches.       Allergies   Allergen Reactions   • Eggs Or Egg-Derived Products Nausea And Vomiting       Objective     There were no vitals filed for this visit.;telephone visit  There is no height or weight on file to calculate BMI.;telephone visit    Physical Exam; telephone visit      Current Outpatient Medications:   •  amLODIPine (NORVASC) 5 MG tablet, Take 1 tablet by mouth Daily., Disp: 90 tablet, Rfl: 3  •  aspirin 81 MG chewable tablet, Chew 81 mg daily., Disp: , Rfl:   •  benazepril (LOTENSIN) 10 MG tablet, Take 1 tablet by mouth Daily., Disp: 90 tablet, Rfl: 3  •  benzonatate (Tessalon Perles) 100 MG capsule, Take 1 capsule by mouth 3 (Three) Times a Day As Needed for Cough., Disp: 30 capsule, Rfl: 0  •  Cholecalciferol (VITAMIN D PO), Take  by mouth., Disp: , Rfl:   •  levothyroxine (SYNTHROID, LEVOTHROID) 175 MCG tablet, TAKE ONE TABLET BY MOUTH DAILY, Disp: 90 tablet, Rfl: 2  •  metFORMIN (GLUCOPHAGE) 500 MG tablet, TAKE ONE TABLET BY MOUTH EVERY MORNING WITH BREAKFAST, Disp: 90 tablet, Rfl: 2  •  metoprolol tartrate (LOPRESSOR) 50 MG tablet, Take 1 tablet by mouth 2 (Two) Times a Day., Disp: 180 tablet, Rfl: 3  •  potassium chloride (KLOR-CON) 20 MEQ packet, Take 20 mEq by mouth 2 (Two) Times a Day., Disp: 180 packet, Rfl: 3  •  rosuvastatin (CRESTOR) 10 MG tablet, TAKE ONE TABLET BY MOUTH DAILY, Disp: 90 tablet, Rfl: 2  •  sertraline (ZOLOFT) 100 MG tablet, TAKE 1 AND 1/2 TABLETS BY MOUTH DAILY, Disp: 135 tablet, Rfl: 2    Time: 15 minutes    Assessment/Plan   Diagnoses and all  orders for this visit:    1. History of COVID-19 (Primary)    2. Advice given about COVID-19 virus infection        Patient Instructions   Complete quarantine through 1/13/21 and then if continue fever free, no chills without use of fever reducer may be out of quarantine; if any resurge of symptoms call office. Pt verb. Understanding.       Return for Dr. Duenas as needed/as recommended.

## 2021-01-11 NOTE — PATIENT INSTRUCTIONS
Complete quarantine through 1/13/21 and then if continue fever free, no chills without use of fever reducer may be out of quarantine; if any resurge of symptoms call office. Pt verb. Understanding.

## 2021-02-03 ENCOUNTER — TELEPHONE (OUTPATIENT)
Dept: FAMILY MEDICINE CLINIC | Facility: CLINIC | Age: 71
End: 2021-02-03

## 2021-02-03 RX ORDER — SERTRALINE HYDROCHLORIDE 100 MG/1
TABLET, FILM COATED ORAL
Qty: 135 TABLET | Refills: 2 | Status: SHIPPED | OUTPATIENT
Start: 2021-02-03 | End: 2021-11-29

## 2021-02-03 NOTE — TELEPHONE ENCOUNTER
Caller: Carmelita Kendrick    Relationship to patient: Emergency Contact-WIFE    Best call back number: [phone]    Patient is needing: PATIENT'S WIFE CALLED TO SCHEDULE WELLNESS AND LAB APPOINTMENTS FOR PATIENT. PLEASE CALL PATIENT BACK TO SCHEDULE LABS. UPCOMING APPT 2/23/21

## 2021-02-08 DIAGNOSIS — E78.5 HYPERLIPIDEMIA, UNSPECIFIED HYPERLIPIDEMIA TYPE: ICD-10-CM

## 2021-02-08 DIAGNOSIS — E11.9 TYPE 2 DIABETES MELLITUS WITHOUT COMPLICATION, WITHOUT LONG-TERM CURRENT USE OF INSULIN (HCC): ICD-10-CM

## 2021-02-08 DIAGNOSIS — E07.9 DISEASE OF THYROID GLAND: ICD-10-CM

## 2021-02-26 ENCOUNTER — OFFICE VISIT (OUTPATIENT)
Dept: FAMILY MEDICINE CLINIC | Facility: CLINIC | Age: 71
End: 2021-02-26

## 2021-02-26 VITALS
RESPIRATION RATE: 18 BRPM | TEMPERATURE: 96.9 F | HEIGHT: 70 IN | SYSTOLIC BLOOD PRESSURE: 122 MMHG | HEART RATE: 67 BPM | OXYGEN SATURATION: 100 % | DIASTOLIC BLOOD PRESSURE: 80 MMHG | WEIGHT: 240.6 LBS | BODY MASS INDEX: 34.44 KG/M2

## 2021-02-26 DIAGNOSIS — Z95.0 HISTORY OF PERMANENT CARDIAC PACEMAKER PLACEMENT: ICD-10-CM

## 2021-02-26 DIAGNOSIS — E07.9 DISEASE OF THYROID GLAND: ICD-10-CM

## 2021-02-26 DIAGNOSIS — I10 ESSENTIAL HYPERTENSION: Primary | ICD-10-CM

## 2021-02-26 DIAGNOSIS — E66.9 OBESITY (BMI 30.0-34.9): ICD-10-CM

## 2021-02-26 DIAGNOSIS — E11.9 TYPE 2 DIABETES MELLITUS WITHOUT COMPLICATION, WITHOUT LONG-TERM CURRENT USE OF INSULIN (HCC): ICD-10-CM

## 2021-02-26 DIAGNOSIS — E78.5 HYPERLIPIDEMIA, UNSPECIFIED HYPERLIPIDEMIA TYPE: ICD-10-CM

## 2021-02-26 PROCEDURE — G0438 PPPS, INITIAL VISIT: HCPCS | Performed by: INTERNAL MEDICINE

## 2021-02-26 PROCEDURE — 99214 OFFICE O/P EST MOD 30 MIN: CPT | Performed by: INTERNAL MEDICINE

## 2021-02-26 NOTE — PROGRESS NOTES
The ABCs of the Annual Wellness Visit  Initial Medicare Wellness Visit    Chief Complaint   Patient presents with   • Medicare Wellness-Initial Visit     PT HERE FOR AWV   • Annual Exam     PT HERE FOR CPE       Subjective   History of Present Illness:  Marco Antonio Kendrick is a 70 y.o. male who presents for an Initial Medicare Wellness Visit.  He also has hypertension, hyperlipidemia, history of cardiac pacemaker, mild obesity, diabetes mellitus type 2, hypothyroidism and history of colon cancer.  He generally has been stable and has no acute complaints.    HEALTH RISK ASSESSMENT    Recent Hospitalizations:  No hospitalization(s) within the last year.    Current Medical Providers:  Patient Care Team:  Henry Duenas MD as PCP - General (Internal Medicine)  Leonel Cage MD as Consulting Physician (Gastroenterology)  Leonel Cage MD as Referring Physician (Gastroenterology)  Kev Latham II, MD as Consulting Physician (Hematology and Oncology)    Smoking Status:  Social History     Tobacco Use   Smoking Status Never Smoker   Smokeless Tobacco Never Used   Tobacco Comment    caffeine use       Alcohol Consumption:  Social History     Substance and Sexual Activity   Alcohol Use Yes    Comment: occasional       Depression Screen:   PHQ-2/PHQ-9 Depression Screening 2/26/2021   Little interest or pleasure in doing things 0   Feeling down, depressed, or hopeless 0   Total Score 0       Fall Risk Screen:  STEADI Fall Risk Assessment was completed, and patient is at LOW risk for falls.Assessment completed on:2/26/2021    Health Habits and Functional and Cognitive Screening:  Functional & Cognitive Status 2/26/2021   Do you have difficulty preparing food and eating? No   Do you have difficulty bathing yourself, getting dressed or grooming yourself? No   Do you have difficulty using the toilet? No   Do you have difficulty moving around from place to place? No   Do you have trouble with steps or getting out  of a bed or a chair? No   Current Diet Well Balanced Diet   Dental Exam Up to date   Eye Exam Up to date   Exercise (times per week) 5 times per week   Current Exercise Activities Include Walking   Do you need help using the phone?  No   Are you deaf or do you have serious difficulty hearing?  Yes   Do you need help with transportation? No   Do you need help shopping? No   Do you need help preparing meals?  No   Do you need help with housework?  No   Do you need help with laundry? No   Do you need help taking your medications? No   Do you need help managing money? No   Do you ever drive or ride in a car without wearing a seat belt? No   Have you felt unusual stress, anger or loneliness in the last month? No   Who do you live with? Spouse   If you need help, do you have trouble finding someone available to you? No   Have you been bothered in the last four weeks by sexual problems? No   Do you have difficulty concentrating, remembering or making decisions? No         Does the patient have evidence of cognitive impairment? Yes    Asprin use counseling:Taking ASA appropriately as indicated    Age-appropriate Screening Schedule:  Refer to the list below for future screening recommendations based on patient's age, sex and/or medical conditions. Orders for these recommended tests are listed in the plan section. The patient has been provided with a written plan.    Health Maintenance   Topic Date Due   • TDAP/TD VACCINES (1 - Tdap) 11/04/1967   • ZOSTER VACCINE (1 of 2) 11/04/1998   • DIABETIC EYE EXAM  04/15/2017   • INFLUENZA VACCINE  08/01/2020   • DIABETIC FOOT EXAM  02/28/2021   • HEMOGLOBIN A1C  08/22/2021   • LIPID PANEL  02/22/2022   • URINE MICROALBUMIN  02/22/2022   • COLONOSCOPY  07/25/2026          The following portions of the patient's history were reviewed and updated as appropriate: allergies, current medications, past family history, past medical history, past social history, past surgical history and  problem list.    Outpatient Medications Prior to Visit   Medication Sig Dispense Refill   • amLODIPine (NORVASC) 5 MG tablet Take 1 tablet by mouth Daily. 90 tablet 3   • aspirin 81 MG chewable tablet Chew 81 mg daily.     • benazepril (LOTENSIN) 10 MG tablet Take 1 tablet by mouth Daily. 90 tablet 3   • benzonatate (Tessalon Perles) 100 MG capsule Take 1 capsule by mouth 3 (Three) Times a Day As Needed for Cough. 30 capsule 0   • Cholecalciferol (VITAMIN D PO) Take  by mouth.     • levothyroxine (SYNTHROID, LEVOTHROID) 175 MCG tablet TAKE ONE TABLET BY MOUTH DAILY 90 tablet 2   • metFORMIN (GLUCOPHAGE) 500 MG tablet TAKE ONE TABLET BY MOUTH EVERY MORNING WITH BREAKFAST 90 tablet 2   • metoprolol tartrate (LOPRESSOR) 50 MG tablet Take 1 tablet by mouth 2 (Two) Times a Day. 180 tablet 3   • potassium chloride (KLOR-CON) 20 MEQ packet Take 20 mEq by mouth 2 (Two) Times a Day. 180 packet 3   • rosuvastatin (CRESTOR) 10 MG tablet TAKE ONE TABLET BY MOUTH DAILY 90 tablet 2   • sertraline (ZOLOFT) 100 MG tablet TAKE 1 AND 1/2 TABLETS EVERY  tablet 2     No facility-administered medications prior to visit.        Patient Active Problem List   Diagnosis   • Long-term (current) use of anticoagulants   • Cancer of sigmoid colon (CMS/HCC)   • Sick sinus syndrome (CMS/HCC)   • BP (high blood pressure)   • History of permanent cardiac pacemaker placement   • Disease of thyroid gland   • Type 2 diabetes mellitus without complication (CMS/HCC)   • Hyperlipidemia   • Obesity (BMI 30.0-34.9)       Advanced Care Planning:  ACP discussion was held with the patient during this visit. Patient has an advance directive in EMR which is still valid.     Review of Systems   Constitutional: Negative.    HENT: Negative.    Respiratory: Negative.    Cardiovascular: Negative.    Gastrointestinal: Negative.    Endocrine: Negative.    Genitourinary: Negative.    Musculoskeletal: Negative.    Skin: Negative.    Allergic/Immunologic: Positive  "for food allergies.        Significant egg allergy   Neurological: Negative.    Hematological: Negative.    Psychiatric/Behavioral: Negative.        Compared to one year ago, the patient feels his physical health is the same.  Compared to one year ago, the patient feels his mental health is the same.    Reviewed chart for potential of high risk medication in the elderly: yes  Reviewed chart for potential of harmful drug interactions in the elderly:yes    Objective         Vitals:    02/26/21 1106   BP: 122/80   Pulse: 67   Resp: 18   Temp: 96.9 °F (36.1 °C)   TempSrc: Oral   SpO2: 100%   Weight: 109 kg (240 lb 9.6 oz)   Height: 177.8 cm (70\")       Body mass index is 34.52 kg/m².  Discussed the patient's BMI with him. The BMI is above average; BMI management plan is completed.    Physical Exam  Vitals signs and nursing note reviewed.   Constitutional:       General: He is not in acute distress.     Appearance: Normal appearance. He is not ill-appearing.   HENT:      Head: Normocephalic and atraumatic.   Eyes:      General: No scleral icterus.     Conjunctiva/sclera: Conjunctivae normal.   Neck:      Musculoskeletal: Normal range of motion and neck supple.   Cardiovascular:      Rate and Rhythm: Normal rate and regular rhythm.      Heart sounds: Normal heart sounds.   Pulmonary:      Effort: Pulmonary effort is normal. No respiratory distress.      Breath sounds: Normal breath sounds. No wheezing or rales.   Musculoskeletal: Normal range of motion.   Skin:     General: Skin is warm and dry.   Neurological:      General: No focal deficit present.      Mental Status: He is alert and oriented to person, place, and time.   Psychiatric:         Mood and Affect: Mood normal.         Behavior: Behavior normal.         Lab Results   Component Value Date     (H) 02/22/2021    CHLPL 147 02/22/2021    TRIG 150 (H) 02/22/2021    HDL 42 02/22/2021    LDL 79 02/22/2021    VLDL 26 02/22/2021    HGBA1C 6.9 (H) 02/22/2021    "     Assessment/Plan CMP has a sugar of 148, minimally high creatinine of 1.34 and is otherwise normal and hemoglobin A1c is high normal at 6.9.  Urine microalbumin was acceptable at 40.  TSH and free T4 were both normal.  Lipid panel has a total cholesterol 147, HDL 42 and LDL 79.  #1-hypertension, controlled on medication  #2-hyperlipidemia controlled on medication  #3-diabetes mellitus type 2 with elevated but acceptable hemoglobin A1c  #4-hypothyroidism, euthyroid on replacement  #5-history of colon cancer, asymptomatic    Medicare Risks and Personalized Health Plan  CMS Preventative Services Quick Reference  Cardiovascular risk  Diabetic Lab Screening   Immunizations Discussed/Encouraged (specific immunizations; adacel Tdap and Shingrix )    The above risks/problems have been discussed with the patient.  Pertinent information has been shared with the patient in the After Visit Summary.  Follow up plans and orders are seen below in the Assessment/Plan Section.    There are no diagnoses linked to this encounter.  Follow Up: I encouraged the patient to get a Tdap immunization as well as the Shingrix immunizations.  I am going to increase his Metformin to 500 mg, 2 tablets with breakfast and he will continue other medicines as now.  I plan on rechecking him in about 4 months with a CMP, hemoglobin A1c and PSA    No follow-ups on file.     An After Visit Summary and PPPS were given to the patient.

## 2021-03-15 ENCOUNTER — BULK ORDERING (OUTPATIENT)
Dept: CASE MANAGEMENT | Facility: OTHER | Age: 71
End: 2021-03-15

## 2021-03-15 DIAGNOSIS — Z23 IMMUNIZATION DUE: ICD-10-CM

## 2021-05-07 ENCOUNTER — TELEPHONE (OUTPATIENT)
Dept: GASTROENTEROLOGY | Facility: CLINIC | Age: 71
End: 2021-05-07

## 2021-05-07 NOTE — TELEPHONE ENCOUNTER
Last scope 07/25/2016 in epic-- no personal hx of polyps-- family hx of polyps and colon ca--ASA-- medications:    amLODIPine (NORVASC) 5 MG tablet  aspirin 81 MG chewable tablet  benazepril (LOTENSIN) 10 MG tablet  Cholecalciferol (VITAMIN D PO)  levothyroxine (SYNTHROID, LEVOTHROID) 175 MCG tablet  metoprolol tartrate (LOPRESSOR) 50 MG tablet  rosuvastatin (CRESTOR) 10 MG tablet  sertraline (ZOLOFT) 100 MG tablet      OA form scanned into media

## 2021-05-18 ENCOUNTER — PREP FOR SURGERY (OUTPATIENT)
Dept: OTHER | Facility: HOSPITAL | Age: 71
End: 2021-05-18

## 2021-05-18 ENCOUNTER — TELEPHONE (OUTPATIENT)
Dept: GASTROENTEROLOGY | Facility: CLINIC | Age: 71
End: 2021-05-18

## 2021-05-18 DIAGNOSIS — Z80.0 FAMILY HISTORY OF COLON CANCER: Primary | ICD-10-CM

## 2021-06-04 ENCOUNTER — CLINICAL SUPPORT NO REQUIREMENTS (OUTPATIENT)
Dept: CARDIOLOGY | Facility: CLINIC | Age: 71
End: 2021-06-04

## 2021-06-04 DIAGNOSIS — I49.5 SICK SINUS SYNDROME (HCC): Primary | ICD-10-CM

## 2021-06-04 PROCEDURE — 93280 PM DEVICE PROGR EVAL DUAL: CPT | Performed by: INTERNAL MEDICINE

## 2021-06-10 DIAGNOSIS — Z12.5 ENCOUNTER FOR SCREENING FOR MALIGNANT NEOPLASM OF PROSTATE: ICD-10-CM

## 2021-06-10 DIAGNOSIS — E11.9 TYPE 2 DIABETES MELLITUS WITHOUT COMPLICATION, WITHOUT LONG-TERM CURRENT USE OF INSULIN (HCC): ICD-10-CM

## 2021-06-16 ENCOUNTER — ANESTHESIA EVENT (OUTPATIENT)
Dept: GASTROENTEROLOGY | Facility: HOSPITAL | Age: 71
End: 2021-06-16

## 2021-06-16 ENCOUNTER — ANESTHESIA (OUTPATIENT)
Dept: GASTROENTEROLOGY | Facility: HOSPITAL | Age: 71
End: 2021-06-16

## 2021-06-16 ENCOUNTER — HOSPITAL ENCOUNTER (OUTPATIENT)
Facility: HOSPITAL | Age: 71
Setting detail: HOSPITAL OUTPATIENT SURGERY
Discharge: HOME | End: 2021-06-16
Attending: INTERNAL MEDICINE | Admitting: INTERNAL MEDICINE

## 2021-06-16 VITALS
OXYGEN SATURATION: 97 % | WEIGHT: 238 LBS | DIASTOLIC BLOOD PRESSURE: 88 MMHG | SYSTOLIC BLOOD PRESSURE: 130 MMHG | HEIGHT: 70 IN | BODY MASS INDEX: 34.07 KG/M2 | RESPIRATION RATE: 14 BRPM | HEART RATE: 64 BPM

## 2021-06-16 PROCEDURE — 25010000002 PROPOFOL 10 MG/ML EMULSION: Performed by: NURSE ANESTHETIST, CERTIFIED REGISTERED

## 2021-06-16 PROCEDURE — S0260 H&P FOR SURGERY: HCPCS | Performed by: INTERNAL MEDICINE

## 2021-06-16 PROCEDURE — G0105 COLORECTAL SCRN; HI RISK IND: HCPCS | Performed by: INTERNAL MEDICINE

## 2021-06-16 RX ORDER — PROPOFOL 10 MG/ML
VIAL (ML) INTRAVENOUS AS NEEDED
Status: DISCONTINUED | OUTPATIENT
Start: 2021-06-16 | End: 2021-06-16 | Stop reason: SURG

## 2021-06-16 RX ORDER — LIDOCAINE HYDROCHLORIDE 20 MG/ML
INJECTION, SOLUTION INFILTRATION; PERINEURAL AS NEEDED
Status: DISCONTINUED | OUTPATIENT
Start: 2021-06-16 | End: 2021-06-16 | Stop reason: SURG

## 2021-06-16 RX ORDER — LIDOCAINE HYDROCHLORIDE 10 MG/ML
0.5 INJECTION, SOLUTION INFILTRATION; PERINEURAL ONCE AS NEEDED
Status: DISCONTINUED | OUTPATIENT
Start: 2021-06-16 | End: 2021-06-16 | Stop reason: HOSPADM

## 2021-06-16 RX ORDER — SODIUM CHLORIDE 0.9 % (FLUSH) 0.9 %
10 SYRINGE (ML) INJECTION AS NEEDED
Status: DISCONTINUED | OUTPATIENT
Start: 2021-06-16 | End: 2021-06-16 | Stop reason: HOSPADM

## 2021-06-16 RX ORDER — SODIUM CHLORIDE, SODIUM LACTATE, POTASSIUM CHLORIDE, CALCIUM CHLORIDE 600; 310; 30; 20 MG/100ML; MG/100ML; MG/100ML; MG/100ML
1000 INJECTION, SOLUTION INTRAVENOUS CONTINUOUS
Status: DISCONTINUED | OUTPATIENT
Start: 2021-06-16 | End: 2021-06-16 | Stop reason: HOSPADM

## 2021-06-16 RX ORDER — PROPOFOL 10 MG/ML
VIAL (ML) INTRAVENOUS CONTINUOUS PRN
Status: DISCONTINUED | OUTPATIENT
Start: 2021-06-16 | End: 2021-06-16 | Stop reason: SURG

## 2021-06-16 RX ADMIN — SODIUM CHLORIDE, POTASSIUM CHLORIDE, SODIUM LACTATE AND CALCIUM CHLORIDE 1000 ML: 600; 310; 30; 20 INJECTION, SOLUTION INTRAVENOUS at 12:26

## 2021-06-16 RX ADMIN — PROPOFOL 90 MG: 10 INJECTION, EMULSION INTRAVENOUS at 12:44

## 2021-06-16 RX ADMIN — LIDOCAINE HYDROCHLORIDE 60 MG: 20 INJECTION, SOLUTION INFILTRATION; PERINEURAL at 12:44

## 2021-06-16 RX ADMIN — PROPOFOL 140 MCG/KG/MIN: 10 INJECTION, EMULSION INTRAVENOUS at 12:44

## 2021-06-16 NOTE — BRIEF OP NOTE
COLONOSCOPY  Progress Note    Marco Antonio Kendrick  6/16/2021    Pre-op Diagnosis:   Family history of colon cancer [Z80.0]       Post-Op Diagnosis Codes:     * Encounter for colonoscopy due to history of colon cancer [Z12.11, Z85.038]     * Diverticulosis [K57.90]     * Internal hemorrhoids [K64.8]    Procedure/CPT® Codes:        Procedure(s):  COLONOSCOPY TO CECUM (SIGMOID RESECTION)    Surgeon(s):  Leonel Cage MD    Anesthesia: Monitored Anesthesia Care    Staff:   Endo Technician: Mikey Mullins PCT  Endo Nurse: Janessa Lizama RN         Estimated Blood Loss: none    Urine Voided: * No values recorded between 6/16/2021 12:37 PM and 6/16/2021  1:03 PM *    Specimens:                None          Drains: * No LDAs found *    Findings: Colonoscopy the terminal ileum with normal ileum mucosa.  Sigmoid resection with diverticulosis and internal hemorrhoids seen.    Complications: None          Leonel Cage MD     Date: 6/16/2021  Time: 13:05 EDT

## 2021-06-16 NOTE — ANESTHESIA PREPROCEDURE EVALUATION
Anesthesia Evaluation     Patient summary reviewed   history of anesthetic complications: PONV               Airway   Dental      Pulmonary    Cardiovascular     ECG reviewed  Rhythm: regular    (+) pacemaker pacemaker, hypertension, dysrhythmias (SSS),       Neuro/Psych  GI/Hepatic/Renal/Endo    (+) obesity,   diabetes mellitus,     Musculoskeletal     Abdominal    Substance History      OB/GYN          Other                        Anesthesia Plan    ASA 3     MAC       Anesthetic plan, all risks, benefits, and alternatives have been provided, discussed and informed consent has been obtained with: patient.

## 2021-06-16 NOTE — DISCHARGE INSTRUCTIONS
For the next 24 hours patient needs to be with a responsible adult.    For 24 hours DO NOT drive, operate machinery, appliances, drink alcohol, make important decisions or sign legal documents.    Start with a light or bland diet if you are feeling sick to your stomach otherwise advance to regular diet as tolerated.    Follow recommendations on procedure report if provided by your doctor.    Call Dr. Cage for problems [phone]    Problems may include but not limited to: large amounts of bleeding, trouble breathing, repeated vomiting, severe unrelieved pain, fever or chills.

## 2021-06-16 NOTE — H&P
[facility]  Pre Procedure History & Physical    Chief Complaint:   History colon cancer and colon polyps    Subjective     HPI:   Patient 70-year-old male with history of hypertension, hyperlipidemia and colon cancer as well as a history of colon polyps presenting for colonoscopy surveillance.    Past Medical History:   Past Medical History:   Diagnosis Date   • Arthritis    • Bipolar disorder (CMS/HCC)    • Bladder wall thickening     MILD NON-SPECIFIC URINARY BLADDER....FOLLOWED BY DR COBURN   • Bone marrow suppression     SIGNIFICANT MYELOSUPRESSION FROM FOLFOX-4 CHEMOTHERAPY   • Cancer of sigmoid colon (CMS/HCC)     STAGE III (T3N1M0)   • Depression    • Disease of thyroid gland     HYPOTHYROIDISM   • Diverticula of colon    • Enlarged prostate    • H/O allergy to eggs    • Headache    • Hyperlipidemia    • Hypertension    • Hyperthyroidism    • Obesity    • Pneumonia 11/2015   • PONV (postoperative nausea and vomiting)    • Sick sinus syndrome (CMS/HCC)     S/P LEFT CHEST PACEMAKER IMPLANTATION   • Splenomegaly        Past Surgical History:  Past Surgical History:   Procedure Laterality Date   • APPENDECTOMY     • COLON SURGERY  2011    RESECTION   • COLONOSCOPY  01/2015    DR. CAGE   • COLONOSCOPY N/A 7/25/2016    Procedure: COLONOSCOPY to cecum and ti;  Surgeon: Leonel Cage MD;  Location: [facility] ENDOSCOPY;  Service:    • CYSTOSCOPY      BY DR COBURN   • PACEMAKER IMPLANTATION Left        Family History:  Family History   Problem Relation Age of Onset   • Colon cancer Father    • Melanoma Brother    • Heart disease Other    • Hypertension Other    • Diabetes Other    • Cancer Other    • Stroke Other        Social History:   reports that he has never smoked. He has never used smokeless tobacco. He reports current alcohol use. He reports that he does not use drugs.    Medications:   Medications Prior to Admission   Medication Sig Dispense Refill Last Dose   • amLODIPine  "(NORVASC) 5 MG tablet Take 1 tablet by mouth Daily. 90 tablet 3 Past Week at Unknown time   • benazepril (LOTENSIN) 10 MG tablet Take 1 tablet by mouth Daily. 90 tablet 3 Past Week at Unknown time   • benzonatate (Tessalon Perles) 100 MG capsule Take 1 capsule by mouth 3 (Three) Times a Day As Needed for Cough. 30 capsule 0 Past Week at Unknown time   • Cholecalciferol (VITAMIN D PO) Take  by mouth.   Past Week at Unknown time   • levothyroxine (SYNTHROID, LEVOTHROID) 175 MCG tablet TAKE ONE TABLET BY MOUTH DAILY 90 tablet 2 Past Week at Unknown time   • metFORMIN (GLUCOPHAGE) 500 MG tablet Take 2 tablets by mouth Every Morning Before Breakfast. 180 tablet 3 Past Week at Unknown time   • metoprolol tartrate (LOPRESSOR) 50 MG tablet Take 1 tablet by mouth 2 (Two) Times a Day. 180 tablet 3 Past Week at Unknown time   • potassium chloride (KLOR-CON) 20 MEQ packet Take 20 mEq by mouth 2 (Two) Times a Day. 180 packet 3 Past Week at Unknown time   • rosuvastatin (CRESTOR) 10 MG tablet TAKE ONE TABLET BY MOUTH DAILY 90 tablet 2 Past Week at Unknown time   • sertraline (ZOLOFT) 100 MG tablet TAKE 1 AND 1/2 TABLETS EVERY  tablet 2 Past Week at Unknown time   • aspirin 81 MG chewable tablet Chew 81 mg daily.   6/14/2021       Allergies:  Eggs or egg-derived products    ROS:    Pertinent items are noted in HPI     Objective     Blood pressure (!) 126/113, pulse 63, resp. rate 8, height 177.8 cm (70\"), weight 108 kg (238 lb), SpO2 97 %.    Physical Exam   Constitutional: Pt is oriented to person, place, and time and well-developed, well-nourished, and in no distress.   Mouth/Throat: Oropharynx is clear and moist.   Neck: Normal range of motion.   Cardiovascular: Normal rate, regular rhythm and normal heart sounds.    Pulmonary/Chest: Effort normal and breath sounds normal.   Abdominal: Soft. Nontender  Skin: Skin is warm and dry.   Psychiatric: Mood, memory, affect and judgment normal.     Assessment/Plan "     Diagnosis:  History of colon cancer and colon polyps    Anticipated Surgical Procedure:  Colonoscopy    The risks, benefits, and alternatives of this procedure have been discussed with the patient or the responsible party- the patient understands and agrees to proceed.

## 2021-06-16 NOTE — ANESTHESIA POSTPROCEDURE EVALUATION
Patient: Marco Antonio Kendrick    Procedure Summary     Date: 06/16/21 Room / Location:  [facility] /  [facility]    Anesthesia Start: 1239 Anesthesia Stop: 1306    Procedure: COLONOSCOPY TO CECUM (SIGMOID RESECTION) (N/A ) Diagnosis:       Encounter for colonoscopy due to history of colon cancer      Diverticulosis      Internal hemorrhoids      (Family history of colon cancer [Z80.0])    Surgeons: Leonel Cage MD Provider: Be Aquino MD    Anesthesia Type: MAC ASA Status: 3          Anesthesia Type: MAC    Vitals  Vitals Value Taken Time   /88 06/16/21 1328   Temp     Pulse 64 06/16/21 1328   Resp 14 06/16/21 1328   SpO2 97 % 06/16/21 1328           Post Anesthesia Care and Evaluation    Patient location during evaluation: PACU  Patient participation: complete - patient participated  Level of consciousness: awake  Pain score: 1  Pain management: adequate  Airway patency: patent  Anesthetic complications: No anesthetic complications  PONV Status: none  Cardiovascular status: acceptable  Respiratory status: acceptable  Hydration status: acceptable

## 2021-07-02 ENCOUNTER — OFFICE VISIT (OUTPATIENT)
Dept: FAMILY MEDICINE CLINIC | Facility: CLINIC | Age: 71
End: 2021-07-02

## 2021-07-02 VITALS
HEART RATE: 83 BPM | OXYGEN SATURATION: 95 % | TEMPERATURE: 97.3 F | DIASTOLIC BLOOD PRESSURE: 80 MMHG | RESPIRATION RATE: 18 BRPM | SYSTOLIC BLOOD PRESSURE: 140 MMHG | HEIGHT: 70 IN | WEIGHT: 240 LBS | BODY MASS INDEX: 34.36 KG/M2

## 2021-07-02 DIAGNOSIS — E66.9 OBESITY (BMI 30.0-34.9): ICD-10-CM

## 2021-07-02 DIAGNOSIS — E11.9 TYPE 2 DIABETES MELLITUS WITHOUT COMPLICATION, WITHOUT LONG-TERM CURRENT USE OF INSULIN (HCC): ICD-10-CM

## 2021-07-02 DIAGNOSIS — Z95.0 HISTORY OF PERMANENT CARDIAC PACEMAKER PLACEMENT: ICD-10-CM

## 2021-07-02 DIAGNOSIS — E78.5 HYPERLIPIDEMIA, UNSPECIFIED HYPERLIPIDEMIA TYPE: ICD-10-CM

## 2021-07-02 DIAGNOSIS — I10 ESSENTIAL HYPERTENSION: Primary | ICD-10-CM

## 2021-07-02 PROCEDURE — 99214 OFFICE O/P EST MOD 30 MIN: CPT | Performed by: INTERNAL MEDICINE

## 2021-07-02 RX ORDER — ROSUVASTATIN CALCIUM 10 MG/1
10 TABLET, COATED ORAL DAILY
Qty: 90 TABLET | Refills: 3 | Status: SHIPPED | OUTPATIENT
Start: 2021-07-02 | End: 2022-11-10 | Stop reason: SDUPTHER

## 2021-07-02 RX ORDER — AMLODIPINE BESYLATE 5 MG/1
5 TABLET ORAL DAILY
Qty: 90 TABLET | Refills: 3 | Status: SHIPPED | OUTPATIENT
Start: 2021-07-02 | End: 2022-06-24

## 2021-07-02 RX ORDER — BENAZEPRIL HYDROCHLORIDE 10 MG/1
10 TABLET ORAL DAILY
Qty: 90 TABLET | Refills: 3 | Status: SHIPPED | OUTPATIENT
Start: 2021-07-02 | End: 2022-11-10 | Stop reason: SDUPTHER

## 2021-07-02 RX ORDER — LEVOTHYROXINE SODIUM 175 UG/1
175 TABLET ORAL DAILY
Qty: 90 TABLET | Refills: 3 | Status: SHIPPED | OUTPATIENT
Start: 2021-07-02 | End: 2022-06-24

## 2021-07-02 NOTE — PROGRESS NOTES
Subjective   Marco Antonio Kendrick is a 70 y.o. male. Patient is here today for follow-up on his hypertension, hyperlipidemia, history of cardiac pacemaker, obesity and diabetes mellitus type 2. He unfortunately has gained a little bit of weight. He otherwise is feeling well and has had no chest pain, shortness of breath, edema or myalgias  Chief Complaint   Patient presents with   • Diabetes     HYPERLIPIDEMIA, HYPERTENSION- F/U LABS          Vitals:    07/02/21 1357   BP: 140/80   Pulse: 83   Resp: 18   Temp: 97.3 °F (36.3 °C)   SpO2: 95%     Body mass index is 34.44 kg/m².  The following portions of the patient's history were reviewed and updated as appropriate: allergies, current medications, past family history, past medical history, past social history, past surgical history and problem list.    Past Medical History:   Diagnosis Date   • Arthritis    • Bipolar disorder (CMS/HCC)    • Bladder wall thickening     MILD NON-SPECIFIC URINARY BLADDER....FOLLOWED BY DR COUBRN   • Bone marrow suppression     SIGNIFICANT MYELOSUPRESSION FROM FOLFOX-4 CHEMOTHERAPY   • Cancer of sigmoid colon (CMS/HCC)     STAGE III (T3N1M0)   • Depression    • Disease of thyroid gland     HYPOTHYROIDISM   • Diverticula of colon    • Enlarged prostate    • H/O allergy to eggs    • Headache    • Hyperlipidemia    • Hypertension    • Hyperthyroidism    • Obesity    • Pneumonia 11/2015   • PONV (postoperative nausea and vomiting)    • Sick sinus syndrome (CMS/HCC)     S/P LEFT CHEST PACEMAKER IMPLANTATION   • Splenomegaly       Allergies   Allergen Reactions   • Eggs Or Egg-Derived Products Nausea And Vomiting      Social History     Socioeconomic History   • Marital status:      Spouse name: Carmelita   • Number of children: 2   • Years of education: Not on file   • Highest education level: Not on file   Tobacco Use   • Smoking status: Never Smoker   • Smokeless tobacco: Never Used   • Tobacco comment: caffeine use   Substance and Sexual  Activity   • Alcohol use: Yes     Comment: occasional   • Drug use: No   • Sexual activity: Defer        Current Outpatient Medications:   •  amLODIPine (NORVASC) 5 MG tablet, Take 1 tablet by mouth Daily., Disp: 90 tablet, Rfl: 3  •  aspirin 81 MG chewable tablet, Chew 81 mg daily., Disp: , Rfl:   •  benazepril (LOTENSIN) 10 MG tablet, Take 1 tablet by mouth Daily., Disp: 90 tablet, Rfl: 3  •  benzonatate (Tessalon Perles) 100 MG capsule, Take 1 capsule by mouth 3 (Three) Times a Day As Needed for Cough., Disp: 30 capsule, Rfl: 0  •  Cholecalciferol (VITAMIN D PO), Take  by mouth., Disp: , Rfl:   •  levothyroxine (SYNTHROID, LEVOTHROID) 175 MCG tablet, Take 1 tablet by mouth Daily., Disp: 90 tablet, Rfl: 3  •  metFORMIN (GLUCOPHAGE) 500 MG tablet, Take 2 tablets by mouth 2 (Two) Times a Day With Meals., Disp: 360 tablet, Rfl: 3  •  metoprolol tartrate (LOPRESSOR) 50 MG tablet, Take 1 tablet by mouth 2 (Two) Times a Day., Disp: 180 tablet, Rfl: 3  •  rosuvastatin (CRESTOR) 10 MG tablet, Take 1 tablet by mouth Daily., Disp: 90 tablet, Rfl: 3  •  sertraline (ZOLOFT) 100 MG tablet, TAKE 1 AND 1/2 TABLETS EVERY DAY, Disp: 135 tablet, Rfl: 2     Objective     History of Present Illness     Review of Systems   Constitutional: Negative.    HENT: Negative.    Respiratory: Negative.    Cardiovascular: Negative.    Gastrointestinal: Negative.    Genitourinary: Negative.    Musculoskeletal: Negative.    Skin: Negative.    Neurological: Negative.    Hematological: Negative.    Psychiatric/Behavioral: Negative.        Physical Exam  Vitals and nursing note reviewed.   Constitutional:       General: He is not in acute distress.     Appearance: Normal appearance. He is obese. He is not ill-appearing.   HENT:      Head: Normocephalic and atraumatic.   Eyes:      General: No scleral icterus.     Conjunctiva/sclera: Conjunctivae normal.   Cardiovascular:      Rate and Rhythm: Normal rate and regular rhythm.      Heart sounds: Normal  heart sounds.   Pulmonary:      Effort: Pulmonary effort is normal. No respiratory distress.      Breath sounds: Normal breath sounds. No wheezing or rales.   Musculoskeletal:         General: Normal range of motion.      Cervical back: Normal range of motion and neck supple.   Skin:     General: Skin is warm and dry.   Neurological:      General: No focal deficit present.      Mental Status: He is alert and oriented to person, place, and time.   Psychiatric:         Mood and Affect: Mood normal.         Behavior: Behavior normal.         ASSESSMENT CMP has a sugar of 145 and was otherwise normal. PSA is normal and stable. Hemoglobin A1c has increased to 7.3.  #1-diabetes mellitus type 2, not optimally controlled  #2-hypertension, borderline control  #3-hyperlipidemia, asymptomatic  #4-obesity with no significant weight loss     Problems Addressed this Visit        Cardiac and Vasculature    BP (high blood pressure) - Primary    Relevant Medications    benazepril (LOTENSIN) 10 MG tablet    amLODIPine (NORVASC) 5 MG tablet    History of permanent cardiac pacemaker placement    Hyperlipidemia    Relevant Medications    rosuvastatin (CRESTOR) 10 MG tablet       Endocrine and Metabolic    Type 2 diabetes mellitus without complication (CMS/[facility])    Relevant Medications    metFORMIN (GLUCOPHAGE) 500 MG tablet    Obesity (BMI 30.0-34.9)      Diagnoses       Codes Comments    Essential hypertension    -  Primary ICD-10-CM: I10  ICD-9-CM: 401.9     Hyperlipidemia, unspecified hyperlipidemia type     ICD-10-CM: E78.5  ICD-9-CM: 272.4     History of permanent cardiac pacemaker placement     ICD-10-CM: Z95.0  ICD-9-CM: V45.01     Obesity (BMI 30.0-34.9)     ICD-10-CM: E66.9  ICD-9-CM: 278.00     Type 2 diabetes mellitus without complication, without long-term current use of insulin (CMS/HCC)     ICD-10-CM: E11.9  ICD-9-CM: 250.00           PLAN I recommended the patient get a Tdap immunization. I am increasing his Metformin  500mg to 2 tablets with breakfast and supper. He will continue other medicines as now and I would like to recheck him in 3 months with a CBC, CMP, lipid panel, hemoglobin A1c    There are no Patient Instructions on file for this visit.  Return in about 3 months (around 10/2/2021) for with labs.

## 2021-09-24 ENCOUNTER — HOSPITAL ENCOUNTER (OUTPATIENT)
Dept: GENERAL RADIOLOGY | Facility: HOSPITAL | Age: 71
Discharge: HOME | End: 2021-09-24
Admitting: STUDENT IN AN ORGANIZED HEALTH CARE EDUCATION/TRAINING PROGRAM

## 2021-09-24 ENCOUNTER — OFFICE VISIT (OUTPATIENT)
Dept: FAMILY MEDICINE CLINIC | Facility: CLINIC | Age: 71
End: 2021-09-24

## 2021-09-24 VITALS
OXYGEN SATURATION: 97 % | BODY MASS INDEX: 34.5 KG/M2 | SYSTOLIC BLOOD PRESSURE: 132 MMHG | DIASTOLIC BLOOD PRESSURE: 78 MMHG | WEIGHT: 241 LBS | TEMPERATURE: 97.8 F | HEART RATE: 80 BPM | HEIGHT: 70 IN

## 2021-09-24 DIAGNOSIS — M25.551 ACUTE RIGHT HIP PAIN: Primary | ICD-10-CM

## 2021-09-24 PROCEDURE — 99213 OFFICE O/P EST LOW 20 MIN: CPT | Performed by: STUDENT IN AN ORGANIZED HEALTH CARE EDUCATION/TRAINING PROGRAM

## 2021-09-24 PROCEDURE — 73502 X-RAY EXAM HIP UNI 2-3 VIEWS: CPT

## 2021-09-24 RX ORDER — METHYLPREDNISOLONE 4 MG/1
TABLET ORAL
Qty: 21 EACH | Refills: 0 | Status: SHIPPED | OUTPATIENT
Start: 2021-09-24 | End: 2022-11-10

## 2021-09-24 RX ORDER — IBUPROFEN 600 MG/1
600 TABLET ORAL EVERY 6 HOURS PRN
Qty: 30 TABLET | Refills: 0 | Status: SHIPPED | OUTPATIENT
Start: 2021-09-24

## 2021-09-27 NOTE — PROGRESS NOTES
"Chief Complaint  Hip Pain (Right side ) and Knee Pain    Subjective          Marco Antonio Kendrick presents to [facility]  History of Present Illness  For right hip pain for almost couple of week.  Patient reported he had right hip pain years ago and he also got steroid injections which helped him with his pain in the past.  Denies any trauma or any episode of recent fall.  Denies fever, joint swelling, nausea, vomiting.  Objective   Vital Signs:   /78   Pulse 80   Temp 97.8 °F (36.6 °C)   Ht 177.8 cm (70\")   Wt 109 kg (241 lb)   SpO2 97%   BMI 34.58 kg/m²     Physical Exam  HENT:      Head: Normocephalic and atraumatic.      Mouth/Throat:      Mouth: Mucous membranes are moist.   Eyes:      Extraocular Movements: Extraocular movements intact.      Conjunctiva/sclera: Conjunctivae normal.      Pupils: Pupils are equal, round, and reactive to light.   Cardiovascular:      Rate and Rhythm: Normal rate.      Pulses: Normal pulses.      Heart sounds: Normal heart sounds.   Pulmonary:      Effort: Pulmonary effort is normal.      Breath sounds: Normal breath sounds.   Abdominal:      General: Bowel sounds are normal.      Palpations: Abdomen is soft.   Musculoskeletal:      Cervical back: Normal range of motion.      Comments: Range of motion on right hip joint limited because of pain   Skin:     General: Skin is warm.      Capillary Refill: Capillary refill takes less than 2 seconds.   Neurological:      General: No focal deficit present.      Mental Status: He is alert.   Psychiatric:         Mood and Affect: Mood normal.        Result Review :     Common labs    Common Labsle 2/22/21 2/22/21 2/22/21 2/22/21 6/15/21 6/15/21 6/15/21    1309 1309 1309 1309 0911 0911 0911   Glucose 148 (A)    145 (A)     BUN 16    19     Creatinine 1.34 (A)    1.16     eGFR Non  Am 53 (A)    62     eGFR  Am 61    75     Sodium 141    139     Potassium 4.0    4.1     Chloride 104    102   "   Calcium 9.2    9.1     Total Protein 6.9    6.4     Albumin 4.0    4.30     Total Bilirubin 0.5    0.4     Alkaline Phosphatase 56    55     AST (SGOT) 17    16     ALT (SGPT) 17    17     Total Cholesterol  147        Triglycerides  150 (A)        HDL Cholesterol  42        LDL Cholesterol   79        Hemoglobin A1C   6.9 (A)   7.30 (A)    Microalbumin, Urine    40.1      PSA       2.800   (A) Abnormal value       Comments are available for some flowsheets but are not being displayed.                     Assessment and Plan    Diagnoses and all orders for this visit:    1. Acute right hip pain (Primary)  -     methylPREDNISolone (MEDROL) 4 MG dose pack; Take as directed on package instructions.  Dispense: 21 each; Refill: 0  -     ibuprofen (ADVIL,MOTRIN) 600 MG tablet; Take 1 tablet by mouth Every 6 (Six) Hours As Needed for Moderate Pain .  Dispense: 30 tablet; Refill: 0  -     XR Hip With or Without Pelvis 2 - 3 View Right  -     Ambulatory Referral to Orthopedic Surgery    X-ray ordered , will follow up on the results.  Patient is given referral to orthopedic for further treatment options.  Given Medrol Dosepak and Advil as needed.    Follow Up   Return if symptoms worsen or fail to improve.  Patient was given instructions and counseling regarding his condition or for health maintenance advice. Please see specific information pulled into the AVS if appropriate.

## 2021-10-21 ENCOUNTER — OFFICE VISIT (OUTPATIENT)
Dept: ORTHOPEDIC SURGERY | Facility: CLINIC | Age: 71
End: 2021-10-21

## 2021-10-21 VITALS — WEIGHT: 243 LBS | TEMPERATURE: 97.7 F | HEIGHT: 70 IN | BODY MASS INDEX: 34.79 KG/M2

## 2021-10-21 DIAGNOSIS — M70.61 TROCHANTERIC BURSITIS OF RIGHT HIP: Primary | ICD-10-CM

## 2021-10-21 PROCEDURE — 99213 OFFICE O/P EST LOW 20 MIN: CPT | Performed by: ORTHOPAEDIC SURGERY

## 2021-10-21 PROCEDURE — 20610 DRAIN/INJ JOINT/BURSA W/O US: CPT | Performed by: ORTHOPAEDIC SURGERY

## 2021-10-21 RX ORDER — METHYLPREDNISOLONE ACETATE 80 MG/ML
80 INJECTION, SUSPENSION INTRA-ARTICULAR; INTRALESIONAL; INTRAMUSCULAR; SOFT TISSUE
Status: COMPLETED | OUTPATIENT
Start: 2021-10-21 | End: 2021-10-21

## 2021-10-21 RX ADMIN — METHYLPREDNISOLONE ACETATE 80 MG: 80 INJECTION, SUSPENSION INTRA-ARTICULAR; INTRALESIONAL; INTRAMUSCULAR; SOFT TISSUE at 10:05

## 2021-10-21 NOTE — PROGRESS NOTES
Patient: Marco Antonio Kendrick  YOB: 1950 70 y.o. male  Medical Record Number: [id number]    Chief Complaints:   Chief Complaint   Patient presents with   • Right Hip - Initial Evaluation       History of Present Illness:Marco Antonio Kendrick is a 70 y.o. male who presents with right lateral hip pain.  He is a new patient to me.  He reports lateral hip pain which is much worse over the last 6 months.  He states he has difficulty laying on his side.  Has had previous injections by other providers but no other treatment.  He states the pain is worse over the last 6 months and now limits his sleep and other activities.    Allergies:   Allergies   Allergen Reactions   • Eggs Or Egg-Derived Products Nausea And Vomiting       Medications:   Current Outpatient Medications   Medication Sig Dispense Refill   • amLODIPine (NORVASC) 5 MG tablet Take 1 tablet by mouth Daily. 90 tablet 3   • aspirin 81 MG chewable tablet Chew 81 mg daily.     • benazepril (LOTENSIN) 10 MG tablet Take 1 tablet by mouth Daily. 90 tablet 3   • Cholecalciferol (VITAMIN D PO) Take  by mouth.     • ibuprofen (ADVIL,MOTRIN) 600 MG tablet Take 1 tablet by mouth Every 6 (Six) Hours As Needed for Moderate Pain . 30 tablet 0   • levothyroxine (SYNTHROID, LEVOTHROID) 175 MCG tablet Take 1 tablet by mouth Daily. 90 tablet 3   • metFORMIN (GLUCOPHAGE) 500 MG tablet Take 2 tablets by mouth 2 (Two) Times a Day With Meals. 360 tablet 3   • methylPREDNISolone (MEDROL) 4 MG dose pack Take as directed on package instructions. 21 each 0   • rosuvastatin (CRESTOR) 10 MG tablet Take 1 tablet by mouth Daily. 90 tablet 3   • sertraline (ZOLOFT) 100 MG tablet TAKE 1 AND 1/2 TABLETS EVERY  tablet 2     No current facility-administered medications for this visit.         The following portions of the patient's history were reviewed and updated as appropriate: allergies, current medications, past family history, past medical history, past social history, past  "surgical history and problem list.    Review of Systems:   A 14 point review of systems was performed. All systems negative except pertinent positives/negative listed in HPI above    Physical Exam:   Vitals:    10/21/21 0917   Temp: 97.7 °F (36.5 °C)   TempSrc: Temporal   Weight: 110 kg (243 lb)   Height: 177.8 cm (70\")       General: A and O x 3, ASA, NAD    SCLERA:    Normal    DENTITION:   Normal   Hip:  right    LEG ALIGNMENT:     Neutral   ,    equal leg lengths    GAIT:     Nonantalgic    SKIN:     No abnormality    RANGE OF MOTION:      Full without joint irritability    STRENGTH:     5 / 5    hip flexion and abduction    DISTAL PULSES:    Paplable    DISTAL SENSATION :   Intact    LYMPHATICS:     No   lymphadenopathy    OTHER:          - Negative Stinchfeld test      - Negative log roll      +Tenderness to palpation trochanteric bursa       Radiology:  Xrays 2views right hip (AP bilateral hips, and lateral of the hip) taken at the hospital were reviewed  demonstrating  minimal arthritic findings but significant lateral trochanteric spurring    Assessment/Plan: Right hip trochanteric bursitis.  I injected the hip bursa as below.  I am going to send him to physical therapy to work on hip fascia stretching as previous interventions have not been successful.  I explained him that he will need to work on a daily stretching exercise regimen in order to prevent this from recurrence.    Large Joint Arthrocentesis: R greater trochanteric bursa  Date/Time: 10/21/2021 10:05 AM  Consent given by: patient  Site marked: site marked  Timeout: Immediately prior to procedure a time out was called to verify the correct patient, procedure, equipment, support staff and site/side marked as required   Supporting Documentation  Indications: pain   Procedure Details  Location: hip - R greater trochanteric bursa  Preparation: Patient was prepped and draped in the usual sterile fashion  Needle size: 18 G  Approach: " lateral  Medications administered: 80 mg methylPREDNISolone acetate 80 MG/ML; 2 mL lidocaine (cardiac)            Vincent Garcia MD  10/21/2021

## 2021-11-22 ENCOUNTER — TREATMENT (OUTPATIENT)
Dept: PHYSICAL THERAPY | Facility: CLINIC | Age: 71
End: 2021-11-22

## 2021-11-22 DIAGNOSIS — M25.551 RIGHT HIP PAIN: Primary | ICD-10-CM

## 2021-11-22 DIAGNOSIS — Z74.09 IMPAIRED FUNCTIONAL MOBILITY, BALANCE, GAIT, AND ENDURANCE: ICD-10-CM

## 2021-11-22 PROCEDURE — 97110 THERAPEUTIC EXERCISES: CPT | Performed by: PHYSICAL THERAPIST

## 2021-11-22 PROCEDURE — 97161 PT EVAL LOW COMPLEX 20 MIN: CPT | Performed by: PHYSICAL THERAPIST

## 2021-11-22 NOTE — PROGRESS NOTES
Physical Therapy Initial Evaluation and Plan of Care      Patient: Marco Antonio Kendrick   : 1950  Diagnosis/ICD-10 Code:  Right hip pain [M25.551]  Referring practitioner: Vincent Garcia MD  Date of Initial Visit: 2021  Today's Date: 2021  Patient seen for 1 sessions           Subjective Questionnaire: LEFS: 61/80      Subjective Evaluation    History of Present Illness  Mechanism of injury: R hip pain x5 years. He reported pain at annual visit and was referred to Dr. Garcia. Injected 10/21/21 with some relief. Takes advil arthritis as needed. Pain is off and on. Increased with squatting, standing from low seat, and laying on R side. Sleeps on R . If I've really got it bad it shoots down my leg. Denies back pain. Pain in R knee getting up from kneeling or sitting in low chairs. No pain walking. Walks 5-6 blocks/day.      Patient Occupation: Retired Pain  Current pain ratin  At worst pain ratin  Location: R greater trochanter  Quality: sharp  Relieving factors: medications  Aggravating factors: squatting and sleeping  Progression: improved    Social Support  Lives in: multiple-level home  Lives with: spouse    Diagnostic Tests  X-ray: abnormal (See imaging)    Treatments  Previous treatment: injection treatment  Current treatment: physical therapy  Patient Goals  Patient goals for therapy: decreased pain, increased strength and independence with ADLs/IADLs             Objective          Tenderness     Right Hip   Tenderness in the greater trochanter (Tender to light touch).     Lumbar Screen  Lumbar range of motion within normal limits with the following exceptions:Flex: hands to mid shin  Ext: min to no lumbar ext  L SB: pain R lumbar, ROM WNL  R SB: WNL  Rotation: WNL B    Active Range of Motion   Left Hip   Flexion: 88 degrees     Right Hip   Flexion: 74 degrees with pain    Passive Range of Motion     Right Hip   External rotation (90/90): 58 degrees   Internal rotation (90/90): 0 degrees  with pain    Strength/Myotome Testing     Left Hip   Planes of Motion   Flexion: 5    Right Hip   Planes of Motion   Flexion: 4 (reports pop, mild pain in R lateral hip)    Left Knee   Flexion: 5  Extension: 5    Right Knee   Flexion: 5  Extension: 4 (pain R lateral hip)    Left Ankle/Foot   Dorsiflexion: 5    Right Ankle/Foot   Dorsiflexion: 5    Tests     Right Hip   Positive DANICA and FADIR.     Additional Tests Details  Slump: severe limitation in mobility B, pain free    Right Hip Flexibility Comments:   Severely limited hamstrings    Ambulation     Observational Gait   Gait: antalgic           Assessment & Plan     Assessment  Impairments: abnormal gait, abnormal muscle tone, abnormal or restricted ROM, activity intolerance, impaired physical strength, lacks appropriate home exercise program and pain with function  Assessment details: Pt c/o R hip pain off/on x5 years. Objective findings include tender R greater trochanter, painful/limited R hip and lumbar AROM, painful and weak R hip strength testing, and positive DANICA and FADIR R.  Pt will benefit from skilled PT services in order to address listed impairments and increase tolerance to normal daily activities including ADLs and recreational activities.    Prognosis: good  Functional Limitations: sleeping, walking, uncomfortable because of pain, standing and stooping  Goals  Plan Goals: Short Term Goals: 4-6 weeks. Patient will:  1. Be independent with initial HEP  2. Be instructed in posture and body mechanics  3. Report >/= 50% decrease in R hip tenderness  4. Report pain of </= 4/10 with daily activities    Long Term Goals: 6-12 weeks. Patient will:  1. Demonstrate improved Bilateral lower extremity/lower abdominal MMT of >/= 5/5 to allow for performance of daily activities without pain.  2. Demonstrate lower extremity flexibility and lumbar ROM WFL to allow for return to household & recreational activities w/o increased symptoms  3. Report pain of </=  1/10 with all daily activities.  4. Perceived disability </= 10% as measured by Oswestry Questionnaire.  5. Be independent with long term HEP    Plan  Therapy options: will be seen for skilled physical therapy services  Planned modality interventions: cryotherapy, electrical stimulation/Russian stimulation, thermotherapy (hydrocollator packs), traction, ultrasound and dry needling  Planned therapy interventions: abdominal trunk stabilization, ADL retraining, body mechanics training, balance/weight-bearing training, flexibility, functional ROM exercises, gait training, home exercise program, joint mobilization, manual therapy, neuromuscular re-education, soft tissue mobilization, spinal/joint mobilization, strengthening, stretching and therapeutic activities  Frequency: 2x week  Duration in weeks: 12  Treatment plan discussed with: patient        Manual Therapy:    0     mins  62183;  Therapeutic Exercise:    25     mins  43030;     Neuromuscular John Paul:    0    mins  26530;    Therapeutic Activity:     0     mins  07100;     Gait Trainin     mins  81630;     Ultrasound:     0     mins  20209;    Electrical Stimulation:    0     mins  58797 ( );  Dry Needling     0     mins self-pay    Timed Treatment:   25   mins   Total Treatment:     45   mins    PT SIGNATURE: Ceci Santos PT   DATE TREATMENT INITIATED: 2021    Initial Certification  Certification Period: 2022  I certify that the therapy services are furnished while this patient is under my care.  The services outlined above are required by this patient, and will be reviewed every 90 days.     PHYSICIAN: Vincent Garcia MD      DATE:     Please sign and return via fax to [phone].. Thank you, [facility].

## 2021-11-22 NOTE — PATIENT INSTRUCTIONS
Access Code: [email or web address]  URL: [email or web address]  Date: 11/22/2021  Prepared by: Ceci Santos    Exercises  Lower Trunk Rotation with Compression Garment - 1 x daily - 7 x weekly - 1 sets - 6 reps - 10s hold  Supine Hip Adduction Isometric with Ball - 1 x daily - 7 x weekly - 1 sets - 10 reps - 5s hold  Hooklying Isometric Hip Abduction with Belt - 1 x daily - 7 x weekly - 1 sets - 10 reps - 5s hold  Supine Figure 4 Piriformis Stretch - 1 x daily - 7 x weekly - 1 sets - 3 reps - 20s hold  Seated Hamstring Stretch - 1 x daily - 7 x weekly - 1 sets - 3 reps - 20s hold

## 2021-11-29 RX ORDER — SERTRALINE HYDROCHLORIDE 100 MG/1
TABLET, FILM COATED ORAL
Qty: 135 TABLET | Refills: 2 | Status: SHIPPED | OUTPATIENT
Start: 2021-11-29 | End: 2022-11-10 | Stop reason: SDUPTHER

## 2021-12-15 ENCOUNTER — TELEPHONE (OUTPATIENT)
Dept: PHYSICAL THERAPY | Facility: CLINIC | Age: 71
End: 2021-12-15

## 2021-12-23 ENCOUNTER — TELEPHONE (OUTPATIENT)
Dept: PHYSICAL THERAPY | Facility: CLINIC | Age: 71
End: 2021-12-23

## 2022-01-03 ENCOUNTER — TREATMENT (OUTPATIENT)
Dept: PHYSICAL THERAPY | Facility: CLINIC | Age: 72
End: 2022-01-03

## 2022-01-03 ENCOUNTER — TELEPHONE (OUTPATIENT)
Dept: ORTHOPEDIC SURGERY | Facility: CLINIC | Age: 72
End: 2022-01-03

## 2022-01-03 DIAGNOSIS — M25.551 RIGHT HIP PAIN: Primary | ICD-10-CM

## 2022-01-03 DIAGNOSIS — Z74.09 IMPAIRED FUNCTIONAL MOBILITY, BALANCE, GAIT, AND ENDURANCE: ICD-10-CM

## 2022-01-03 DIAGNOSIS — M70.61 TROCHANTERIC BURSITIS OF RIGHT HIP: Primary | ICD-10-CM

## 2022-01-03 PROCEDURE — 97164 PT RE-EVAL EST PLAN CARE: CPT | Performed by: PHYSICAL THERAPIST

## 2022-01-03 PROCEDURE — 97110 THERAPEUTIC EXERCISES: CPT | Performed by: PHYSICAL THERAPIST

## 2022-01-03 NOTE — PATIENT INSTRUCTIONS
Access Code: [email or web address]  URL: [email or web address]  Date: 01/03/2022  Prepared by: Ceci Santos    Exercises  Lower Trunk Rotation with Compression Garment - 1 x daily - 7 x weekly - 1 sets - 6 reps - 10s to the left only hold  Supine Hip Adduction Isometric with Ball - 1 x daily - 7 x weekly - 1 sets - 10 reps - 5s hold  Hooklying Isometric Hip Abduction with Belt - 1 x daily - 7 x weekly - 1 sets - 10 reps - 5s hold  Supine Figure 4 Piriformis Stretch - 1 x daily - 7 x weekly - 1 sets - 3 reps - 20s press away hold  Seated Hamstring Stretch - 1 x daily - 7 x weekly - 1 sets - 3 reps - 20s hold  Supine Hamstring Stretch - 1 x daily - 7 x weekly - 3 sets - 10 reps  Supine Hamstring Stretch - 1 x daily - 7 x weekly - 1 sets - 3 reps - 20s hold  Supine Sciatic Nerve Glide - 1 x daily - 7 x weekly - 1 sets - 20 reps  Supine Pelvic Tilt - 1 x daily - 7 x weekly - 1 sets - 10 reps - 5s hold

## 2022-01-03 NOTE — PROGRESS NOTES
Physical Therapy Re-Evaluation and Plan of Care    Patient: Marco Antonio Kendrick   : 1950  Diagnosis/ICD-10 Code:  Right hip pain [M25.551]  Referring practitioner: Vincent Garcia MD  Date of Initial Visit: 1/3/2022  Today's Date: 1/3/2022  Patient seen for 2 sessions           Subjective Questionnaire: LEFS: 63/80      Subjective Evaluation    History of Present Illness  Mechanism of injury: R hip pain reaching with R arm, laying on R side, squatting. Aches off/on. Has been doing home exercises daily for the past 6 weeks w/o relief. Walks ~1 mile daily. Takes tylenol arthritis with some relief. Did not attend PT due to the holidays.    Pain  Current pain ratin  At worst pain ratin  Location: R low back, hip  Quality: dull ache  Relieving factors: medications  Progression: no change    Social Support  Lives in: multiple-level home  Lives with: spouse    Diagnostic Tests  X-ray: abnormal (see imaging)    Treatments  Previous treatment: injection treatment  Current treatment: physical therapy  Patient Goals  Patient goals for therapy: decreased pain, improved balance, increased motion, increased strength, independence with ADLs/IADLs and return to sport/leisure activities             Objective          Palpation     Right Tenderness of the gluteus litzy, gluteus medius and lumbar paraspinals.     Tenderness     Right Hip   Tenderness in the greater trochanter and sacroiliac joint.     Additional Tenderness Details  TTP R ITB    Active Range of Motion     Lumbar   Flexion: WFL  Extension: with pain  Left lateral flexion: with pain  Right lateral flexion: with pain  Left rotation: WFL  Right rotation: WFL    Additional Active Range of Motion Details  Pain R lumbar    Strength/Myotome Testing     Left Hip   Planes of Motion   Flexion: 5    Right Hip   Planes of Motion   Flexion: 5  Extension: 4  Abduction: 4+    Left Knee   Flexion: 5  Extension: 5    Right Knee   Flexion: 5  Extension: 5    Left  Ankle/Foot   Dorsiflexion: 5    Right Ankle/Foot   Dorsiflexion: 5    Tests       Thoracic   Positive slump (R low back pain, limited TKE B).     Lumbar     Right   Positive passive SLR (R posterior hip pain @45deg).     Additional Tests Details  DANICA: R posterolateral hip pain    Lumbar Flexibility Comments:   Mod limitation hamstrings B          Assessment/Plan    Assessment  Impairments: abnormal gait, abnormal muscle tone, abnormal or restricted ROM, activity intolerance, impaired physical strength, lacks appropriate home exercise program and pain with function  Assessment details: Pt c/o chronic R hip pain. Objective findings include lumbar and R hip tenderness, painful/limited R hip and lumbar AROM, R hip weakness, and positive Slump, DANICA, and SLR.   Pt will benefit from skilled PT services in order to address listed impairments and increase tolerance to normal daily activities including ADLs and recreational activities.    Prognosis: good  Functional Limitations: sleeping, walking, uncomfortable because of pain, standing and stooping    Goals  Plan Goals: Short Term Goals: 4-6 weeks. Patient will:  1. Be independent with initial HEP  2. Be instructed in posture and body mechanics  3. Report >/= 50% decrease in R hip tenderness  4. Report pain of </= 4/10 with daily activities    Long Term Goals: 6-12 weeks. Patient will:  1. Demonstrate improved Bilateral lower extremity/lower abdominal MMT of >/= 5/5 to allow for performance of daily activities without pain.  2. Demonstrate lower extremity flexibility and lumbar ROM WFL to allow for return to household & recreational activities w/o increased symptoms  3. Report pain of </= 1/10 with all daily activities.  4. Perceived disability </= 10% as measured by Oswestry Questionnaire.  5. Be independent with long term HEP     Plan  Therapy options: will be seen for skilled physical therapy services  Planned modality interventions: cryotherapy, electrical  stimulation/Russian stimulation, thermotherapy (hydrocollator packs), traction, ultrasound and dry needling  Planned therapy interventions: abdominal trunk stabilization, ADL retraining, body mechanics training, balance/weight-bearing training, flexibility, functional ROM exercises, gait training, home exercise program, joint mobilization, manual therapy, neuromuscular re-education, soft tissue mobilization, spinal/joint mobilization, strengthening, stretching and therapeutic activities  Frequency: 2x week  Duration in weeks: 12  Treatment plan discussed with: patient    Manual Therapy:    0     mins  25167;  Therapeutic Exercise:    25     mins  98032;     Neuromuscular John Paul:    0    mins  30148;    Therapeutic Activity:     0     mins  70401;     Gait Trainin     mins  52802;     Ultrasound:     0     mins  76320;    Electrical Stimulation:    0     mins  52136 ( );  Dry Needling     0     mins self-pay    Timed Treatment:   25   mins   Total Treatment:     45   mins    PT SIGNATURE: Ceci Santos, CHRISTIE   DATE TREATMENT INITIATED: 1/3/2022    Initial Certification  Certification Period: 4/3/2022  I certify that the therapy services are furnished while this patient is under my care.  The services outlined above are required by this patient, and will be reviewed every 90 days.     PHYSICIAN: Vincent Garcia MD      DATE:     Please sign and return via fax to [phone].. Thank you, [facility].

## 2022-01-03 NOTE — TELEPHONE ENCOUNTER
HUB UNSUCCESSFULLY ATTEMPTED TO WARM TRANSFER CALL.    Caller: ROBERT    Relationship: [facility]    Best call back number: [phone]    What orders are you requesting (i.e. lab or imaging): PHYSICAL THERAPY    In what timeframe would the patient need to come in: PT IS CURRENTLY THERE    Where will you receive your lab/imaging services: [facility]    Additional notes: NEED UPDATED PHYSICAL THERAPY ORDERS FOR PT

## 2022-01-05 ENCOUNTER — TREATMENT (OUTPATIENT)
Dept: PHYSICAL THERAPY | Facility: CLINIC | Age: 72
End: 2022-01-05

## 2022-01-05 DIAGNOSIS — M25.551 RIGHT HIP PAIN: Primary | ICD-10-CM

## 2022-01-05 DIAGNOSIS — Z74.09 IMPAIRED FUNCTIONAL MOBILITY, BALANCE, GAIT, AND ENDURANCE: ICD-10-CM

## 2022-01-05 PROCEDURE — 97140 MANUAL THERAPY 1/> REGIONS: CPT | Performed by: PHYSICAL THERAPIST

## 2022-01-05 PROCEDURE — 97110 THERAPEUTIC EXERCISES: CPT | Performed by: PHYSICAL THERAPIST

## 2022-01-05 NOTE — PROGRESS NOTES
Physical Therapy Daily Treatment Note      Patient: Marco Antonio Kendrick   : 1950  Referring practitioner: Vincent Garcia MD  Date of Initial Visit: Type: THERAPY  Noted: 2021  Today's Date: 2022  Patient seen for 3 sessions       Visit Diagnoses:    ICD-10-CM ICD-9-CM   1. Right hip pain  M25.551 719.45   2. Impaired functional mobility, balance, gait, and endurance  Z74.09 V49.89       Subjective   No new changes.    Objective   See Exercise, Manual, and Modality Logs for complete treatment.     Assessment/Plan    around R hip and thigh. No c/o pain with exercise. Updated HEP.    Timed:         Manual Therapy:    10     mins  87247;     Therapeutic Exercise:    30     mins  72084;     Neuromuscular John Paul:    0    mins  99129;    Therapeutic Activity:     0     mins  18336;     Gait Trainin     mins  48314;     Ultrasound:     0     mins  35149;    Ionto                               0    mins   64898        Timed Treatment:   40   mins   Total Treatment:     40   mins    Ceci Santos, PT  [place] License: [id number]

## 2022-03-03 PROCEDURE — 93296 REM INTERROG EVL PM/IDS: CPT | Performed by: INTERNAL MEDICINE

## 2022-03-03 PROCEDURE — 93294 REM INTERROG EVL PM/LDLS PM: CPT | Performed by: INTERNAL MEDICINE

## 2022-06-01 DIAGNOSIS — E78.5 HYPERLIPIDEMIA, UNSPECIFIED HYPERLIPIDEMIA TYPE: Primary | ICD-10-CM

## 2022-06-01 DIAGNOSIS — E07.9 DISEASE OF THYROID GLAND: ICD-10-CM

## 2022-06-01 DIAGNOSIS — E11.9 TYPE 2 DIABETES MELLITUS WITHOUT COMPLICATION, WITHOUT LONG-TERM CURRENT USE OF INSULIN: ICD-10-CM

## 2022-06-06 ENCOUNTER — TELEPHONE (OUTPATIENT)
Dept: FAMILY MEDICINE CLINIC | Facility: CLINIC | Age: 72
End: 2022-06-06

## 2022-06-06 NOTE — TELEPHONE ENCOUNTER
Hub staff attempted to follow warm transfer process and was unsuccessful     Caller: Carmelita Kendrick    Relationship to patient: Emergency Contact    Best call back number: [phone]     Patient is needing: PATIENTS SPOUSE CALLING TO RESCHEDULE LAB APPOINTMENT

## 2022-06-07 ENCOUNTER — TELEPHONE (OUTPATIENT)
Dept: FAMILY MEDICINE CLINIC | Facility: CLINIC | Age: 72
End: 2022-06-07

## 2022-06-07 NOTE — TELEPHONE ENCOUNTER
OKAY FOR HUB TO READ :      LEFT MESSAGE FOR PATIENT, RESCHEDULED LAB APPOINTMENT FOR 7/1/2022 AT 9:15AM  FASTING LABS, 8 HOURS PLENTY OF WATER AND ONLY BLACK COFFEE

## 2022-06-20 ENCOUNTER — OFFICE VISIT (OUTPATIENT)
Dept: CARDIOLOGY | Facility: CLINIC | Age: 72
End: 2022-06-20

## 2022-06-20 VITALS
HEART RATE: 68 BPM | OXYGEN SATURATION: 93 % | BODY MASS INDEX: 34.7 KG/M2 | WEIGHT: 242.4 LBS | DIASTOLIC BLOOD PRESSURE: 90 MMHG | SYSTOLIC BLOOD PRESSURE: 146 MMHG | HEIGHT: 70 IN

## 2022-06-20 DIAGNOSIS — I49.5 SICK SINUS SYNDROME: Primary | ICD-10-CM

## 2022-06-20 DIAGNOSIS — I10 PRIMARY HYPERTENSION: ICD-10-CM

## 2022-06-20 PROCEDURE — 93000 ELECTROCARDIOGRAM COMPLETE: CPT | Performed by: INTERNAL MEDICINE

## 2022-06-20 PROCEDURE — 99213 OFFICE O/P EST LOW 20 MIN: CPT | Performed by: INTERNAL MEDICINE

## 2022-06-20 NOTE — PROGRESS NOTES
OFFICE VISIT      Date of Office Visit: 2022    Patient Name: Marco Antonio Kendrick  : 1950    Encounter Provider: Taz Bazan MD  Referring Provider: Taz Bazan MD  Primary Care Provider: Henry Duenas MD  Place of Service: [facility] CARDIOLOGY        Chief Complaint   Patient presents with   • Sick sinus syndrome   • Follow-up     History of Present Illness    The patient is a 71-year-old white male with sick sinus syndrome and hypertension who returns to the office today for follow-up.  The patient states that he is feeling well from a cardiac standpoint.  He is monitoring his blood pressure recently regularly.  I have noted that his pressure is at the upper limits of normal.    The patient has a permanent pacemaker implanted for sick sinus syndrome.  Function appears to be normal.    Past Medical History:   Diagnosis Date   • Arthritis    • Bipolar disorder (HCC)    • Bladder wall thickening     MILD NON-SPECIFIC URINARY BLADDER....FOLLOWED BY DR COBURN   • Bone marrow suppression     SIGNIFICANT MYELOSUPRESSION FROM FOLFOX-4 CHEMOTHERAPY   • Cancer of sigmoid colon (HCC)     STAGE III (T3N1M0)   • Depression    • Disease of thyroid gland     HYPOTHYROIDISM   • Diverticula of colon    • Enlarged prostate    • H/O allergy to eggs    • Headache    • Hyperlipidemia    • Hypertension    • Hyperthyroidism    • Obesity    • Pneumonia 2015   • PONV (postoperative nausea and vomiting)    • Sick sinus syndrome (HCC)     S/P LEFT CHEST PACEMAKER IMPLANTATION   • Splenomegaly          Past Surgical History:   Procedure Laterality Date   • APPENDECTOMY     • COLON SURGERY      RESECTION   • COLONOSCOPY  2015    DR. CAGE   • COLONOSCOPY N/A 2016    Procedure: COLONOSCOPY to cecum and ti;  Surgeon: Leonel Cage MD;  Location: [facility] ENDOSCOPY;  Service:    • COLONOSCOPY N/A 2021    Procedure: COLONOSCOPY TO CECUM (SIGMOID  RESECTION);  Surgeon: Leonel Cage MD;  Location: [facility] ENDOSCOPY;  Service: Gastroenterology;  Laterality: N/A;  pre:  personal hx of polyps and colon ca  post: DIVERTICULOSIS, HEMORRHOIDS   • CYSTOSCOPY      BY DR COBURN   • PACEMAKER IMPLANTATION Left            Current Outpatient Medications:   •  amLODIPine (NORVASC) 5 MG tablet, Take 1 tablet by mouth Daily., Disp: 90 tablet, Rfl: 3  •  aspirin 81 MG chewable tablet, Chew 81 mg daily., Disp: , Rfl:   •  benazepril (LOTENSIN) 10 MG tablet, Take 1 tablet by mouth Daily., Disp: 90 tablet, Rfl: 3  •  Cholecalciferol (VITAMIN D PO), Take  by mouth., Disp: , Rfl:   •  ibuprofen (ADVIL,MOTRIN) 600 MG tablet, Take 1 tablet by mouth Every 6 (Six) Hours As Needed for Moderate Pain ., Disp: 30 tablet, Rfl: 0  •  levothyroxine (SYNTHROID, LEVOTHROID) 175 MCG tablet, Take 1 tablet by mouth Daily., Disp: 90 tablet, Rfl: 3  •  metFORMIN (GLUCOPHAGE) 500 MG tablet, Take 2 tablets by mouth 2 (Two) Times a Day With Meals., Disp: 360 tablet, Rfl: 3  •  methylPREDNISolone (MEDROL) 4 MG dose pack, Take as directed on package instructions., Disp: 21 each, Rfl: 0  •  rosuvastatin (CRESTOR) 10 MG tablet, Take 1 tablet by mouth Daily., Disp: 90 tablet, Rfl: 3  •  sertraline (ZOLOFT) 100 MG tablet, TAKE 1 AND 1/2 TABLETS EVERY DAY, Disp: 135 tablet, Rfl: 2      Social History     Socioeconomic History   • Marital status:      Spouse name: Carmelita   • Number of children: 2   Tobacco Use   • Smoking status: Never Smoker   • Smokeless tobacco: Never Used   • Tobacco comment: caffeine use   Vaping Use   • Vaping Use: Never used   Substance and Sexual Activity   • Alcohol use: Yes     Comment: occasional   • Drug use: No   • Sexual activity: Defer         Review of Systems   Constitutional: Positive for malaise/fatigue.   HENT: Negative.    Eyes: Negative.    Cardiovascular: Negative.    Respiratory: Negative.    Endocrine: Negative.    Skin: Negative.    Musculoskeletal:  "Negative.    Gastrointestinal: Negative.    Neurological: Positive for light-headedness.   Psychiatric/Behavioral: Negative.        Procedures      ECG 12 Lead    Date/Time: 6/20/2022 4:24 PM  Performed by: Taz Bazan MD  Authorized by: Taz Bazan MD   Comparison: compared with previous ECG from 5/12/2020  Rate: normal  Conduction: conduction normal  QRS axis: normal  Comments: Atrial paced rhythm                Objective:    /90 (BP Location: Left arm, Patient Position: Sitting)   Pulse 68   Ht 177.8 cm (70\")   Wt 110 kg (242 lb 6.4 oz)   SpO2 93%   BMI 34.78 kg/m²         Vitals reviewed.   Constitutional:       Appearance: Healthy appearance. Well-developed and not in distress.   HENT:      Head: Normocephalic.   Neck:      Thyroid: No thyromegaly.      Vascular: No carotid bruit or JVD.   Pulmonary:      Effort: Pulmonary effort is normal.      Breath sounds: Normal breath sounds.   Cardiovascular:      Normal rate. Regular rhythm. Normal S1. Normal S2.      Murmurs: There is no murmur.      No gallop. No click. No rub.   Pulses:     Intact distal pulses.   Edema:     Peripheral edema absent.   Skin:     General: Skin is warm and dry.      Findings: No erythema.   Neurological:      Mental Status: Alert and oriented to person, place, and time.             Assessment & Plan:       Diagnosis Plan   1. Sick sinus syndrome (HCC)     2. Primary hypertension         1.  Sick sinus syndrome: Normal pacemaker function  2.  Hypertension: Controlled    "

## 2022-06-22 ENCOUNTER — CLINICAL SUPPORT NO REQUIREMENTS (OUTPATIENT)
Dept: CARDIOLOGY | Facility: CLINIC | Age: 72
End: 2022-06-22

## 2022-06-22 DIAGNOSIS — I49.5 SICK SINUS SYNDROME: Primary | ICD-10-CM

## 2022-06-22 PROCEDURE — 93280 PM DEVICE PROGR EVAL DUAL: CPT | Performed by: INTERNAL MEDICINE

## 2022-06-24 RX ORDER — AMLODIPINE BESYLATE 5 MG/1
TABLET ORAL
Qty: 14 TABLET | Refills: 0 | Status: SHIPPED | OUTPATIENT
Start: 2022-06-24 | End: 2022-07-19

## 2022-06-24 RX ORDER — LEVOTHYROXINE SODIUM 175 UG/1
TABLET ORAL
Qty: 14 TABLET | Refills: 0 | Status: SHIPPED | OUTPATIENT
Start: 2022-06-24 | End: 2022-07-19

## 2022-06-27 ENCOUNTER — TELEPHONE (OUTPATIENT)
Dept: CARDIOLOGY | Facility: CLINIC | Age: 72
End: 2022-06-27

## 2022-06-27 NOTE — TELEPHONE ENCOUNTER
Pt wife called. She would like to know if he should start back on his metoprolol again.If so can you put order to georges.

## 2022-07-07 ENCOUNTER — OFFICE VISIT (OUTPATIENT)
Dept: FAMILY MEDICINE CLINIC | Facility: CLINIC | Age: 72
End: 2022-07-07

## 2022-07-07 VITALS
TEMPERATURE: 97.4 F | WEIGHT: 242.6 LBS | HEART RATE: 80 BPM | HEIGHT: 70 IN | OXYGEN SATURATION: 96 % | BODY MASS INDEX: 34.73 KG/M2 | SYSTOLIC BLOOD PRESSURE: 133 MMHG | RESPIRATION RATE: 18 BRPM | DIASTOLIC BLOOD PRESSURE: 82 MMHG

## 2022-07-07 DIAGNOSIS — Z12.5 SCREENING PSA (PROSTATE SPECIFIC ANTIGEN): ICD-10-CM

## 2022-07-07 DIAGNOSIS — E11.9 TYPE 2 DIABETES MELLITUS WITHOUT COMPLICATION, WITHOUT LONG-TERM CURRENT USE OF INSULIN: Primary | ICD-10-CM

## 2022-07-07 DIAGNOSIS — E07.9 DISEASE OF THYROID GLAND: ICD-10-CM

## 2022-07-07 DIAGNOSIS — I10 PRIMARY HYPERTENSION: ICD-10-CM

## 2022-07-07 DIAGNOSIS — E78.5 HYPERLIPIDEMIA, UNSPECIFIED HYPERLIPIDEMIA TYPE: ICD-10-CM

## 2022-07-07 DIAGNOSIS — Z95.0 HISTORY OF PERMANENT CARDIAC PACEMAKER PLACEMENT: ICD-10-CM

## 2022-07-07 PROCEDURE — 99214 OFFICE O/P EST MOD 30 MIN: CPT | Performed by: INTERNAL MEDICINE

## 2022-07-07 RX ORDER — PIOGLITAZONEHYDROCHLORIDE 30 MG/1
30 TABLET ORAL DAILY
Qty: 90 TABLET | Refills: 3 | Status: SHIPPED | OUTPATIENT
Start: 2022-07-07

## 2022-07-19 RX ORDER — LEVOTHYROXINE SODIUM 175 UG/1
TABLET ORAL
Qty: 90 TABLET | Refills: 1 | Status: SHIPPED | OUTPATIENT
Start: 2022-07-19 | End: 2022-11-10 | Stop reason: SDUPTHER

## 2022-07-19 RX ORDER — AMLODIPINE BESYLATE 5 MG/1
TABLET ORAL
Qty: 90 TABLET | Refills: 1 | Status: SHIPPED | OUTPATIENT
Start: 2022-07-19 | End: 2022-11-10 | Stop reason: SDUPTHER

## 2022-11-08 LAB
ALBUMIN SERPL-MCNC: 4.3 G/DL (ref 3.7–4.7)
ALBUMIN/GLOB SERPL: 1.8 {RATIO} (ref 1.2–2.2)
ALP SERPL-CCNC: 57 IU/L (ref 44–121)
ALT SERPL-CCNC: 14 IU/L (ref 0–44)
AST SERPL-CCNC: 16 IU/L (ref 0–40)
BILIRUB SERPL-MCNC: 0.4 MG/DL (ref 0–1.2)
BUN SERPL-MCNC: 14 MG/DL (ref 8–27)
BUN/CREAT SERPL: 12 (ref 10–24)
CALCIUM SERPL-MCNC: 8.8 MG/DL (ref 8.6–10.2)
CHLORIDE SERPL-SCNC: 107 MMOL/L (ref 96–106)
CHOLEST SERPL-MCNC: 158 MG/DL (ref 100–199)
CO2 SERPL-SCNC: 21 MMOL/L (ref 20–29)
CREAT SERPL-MCNC: 1.19 MG/DL (ref 0.76–1.27)
EGFRCR SERPLBLD CKD-EPI 2021: 64 ML/MIN/1.73
GLOBULIN SER CALC-MCNC: 2.4 G/DL (ref 1.5–4.5)
GLUCOSE SERPL-MCNC: 120 MG/DL (ref 70–99)
HBA1C MFR BLD: 6.5 % (ref 4.8–5.6)
HDLC SERPL-MCNC: 50 MG/DL
LDLC SERPL CALC-MCNC: 90 MG/DL (ref 0–99)
LDLC/HDLC SERPL: 1.8 RATIO (ref 0–3.6)
POTASSIUM SERPL-SCNC: 4.2 MMOL/L (ref 3.5–5.2)
PROT SERPL-MCNC: 6.7 G/DL (ref 6–8.5)
PSA SERPL-MCNC: 3 NG/ML (ref 0–4)
SODIUM SERPL-SCNC: 142 MMOL/L (ref 134–144)
T4 FREE SERPL-MCNC: 1.09 NG/DL (ref 0.82–1.77)
TRIGL SERPL-MCNC: 97 MG/DL (ref 0–149)
TSH SERPL DL<=0.005 MIU/L-ACNC: 2.69 UIU/ML (ref 0.45–4.5)
UNABLE TO VOID: NORMAL
VLDLC SERPL CALC-MCNC: 18 MG/DL (ref 5–40)

## 2022-11-10 ENCOUNTER — OFFICE VISIT (OUTPATIENT)
Dept: FAMILY MEDICINE CLINIC | Facility: CLINIC | Age: 72
End: 2022-11-10

## 2022-11-10 VITALS
HEIGHT: 70 IN | WEIGHT: 257 LBS | OXYGEN SATURATION: 92 % | SYSTOLIC BLOOD PRESSURE: 130 MMHG | DIASTOLIC BLOOD PRESSURE: 90 MMHG | BODY MASS INDEX: 36.79 KG/M2 | HEART RATE: 87 BPM

## 2022-11-10 DIAGNOSIS — Z79.01 LONG TERM CURRENT USE OF ANTICOAGULANT THERAPY: ICD-10-CM

## 2022-11-10 DIAGNOSIS — I10 PRIMARY HYPERTENSION: Primary | ICD-10-CM

## 2022-11-10 DIAGNOSIS — E07.9 DISEASE OF THYROID GLAND: ICD-10-CM

## 2022-11-10 DIAGNOSIS — Z95.0 HISTORY OF PERMANENT CARDIAC PACEMAKER PLACEMENT: ICD-10-CM

## 2022-11-10 DIAGNOSIS — E78.5 HYPERLIPIDEMIA, UNSPECIFIED HYPERLIPIDEMIA TYPE: ICD-10-CM

## 2022-11-10 DIAGNOSIS — E11.9 TYPE 2 DIABETES MELLITUS WITHOUT COMPLICATION, WITHOUT LONG-TERM CURRENT USE OF INSULIN: ICD-10-CM

## 2022-11-10 DIAGNOSIS — E66.9 OBESITY (BMI 30.0-34.9): ICD-10-CM

## 2022-11-10 PROCEDURE — G0008 ADMIN INFLUENZA VIRUS VAC: HCPCS | Performed by: INTERNAL MEDICINE

## 2022-11-10 PROCEDURE — 90662 IIV NO PRSV INCREASED AG IM: CPT | Performed by: INTERNAL MEDICINE

## 2022-11-10 PROCEDURE — 99214 OFFICE O/P EST MOD 30 MIN: CPT | Performed by: INTERNAL MEDICINE

## 2022-11-10 RX ORDER — BENAZEPRIL HYDROCHLORIDE 10 MG/1
10 TABLET ORAL DAILY
Qty: 90 TABLET | Refills: 3 | Status: SHIPPED | OUTPATIENT
Start: 2022-11-10

## 2022-11-10 RX ORDER — LEVOTHYROXINE SODIUM 175 UG/1
175 TABLET ORAL DAILY
Qty: 90 TABLET | Refills: 3 | Status: SHIPPED | OUTPATIENT
Start: 2022-11-10

## 2022-11-10 RX ORDER — ROSUVASTATIN CALCIUM 10 MG/1
10 TABLET, COATED ORAL DAILY
Qty: 90 TABLET | Refills: 3 | Status: SHIPPED | OUTPATIENT
Start: 2022-11-10

## 2022-11-10 RX ORDER — SERTRALINE HYDROCHLORIDE 100 MG/1
150 TABLET, FILM COATED ORAL DAILY
Qty: 135 TABLET | Refills: 3 | Status: SHIPPED | OUTPATIENT
Start: 2022-11-10

## 2022-11-10 RX ORDER — AMLODIPINE BESYLATE 5 MG/1
5 TABLET ORAL DAILY
Qty: 90 TABLET | Refills: 3 | Status: SHIPPED | OUTPATIENT
Start: 2022-11-10

## 2022-11-10 NOTE — PROGRESS NOTES
Subjective   Marco Antonio Kendrick is a 72 y.o. male. Patient is here today for follow-up on his hypertension, history of cardiac pacemaker, hyperlipidemia, anticoagulation, hypothyroidism, obesity, diabetes mellitus type 2.  He is generally stable and feels well and has no acute  Chief Complaint   Patient presents with   • Hyperlipidemia     4MO FU          Vitals:    11/10/22 1515   BP: 130/90   Pulse: 87   SpO2: 92%     Body mass index is 36.88 kg/m².  The following portions of the patient's history were reviewed and updated as appropriate: allergies, current medications, past family history, past medical history, past social history, past surgical history and problem list.    Past Medical History:   Diagnosis Date   • Arthritis    • Bipolar disorder (HCC)    • Bladder wall thickening     MILD NON-SPECIFIC URINARY BLADDER....FOLLOWED BY DR COBURN   • Bone marrow suppression     SIGNIFICANT MYELOSUPRESSION FROM FOLFOX-4 CHEMOTHERAPY   • Cancer of sigmoid colon (HCC)     STAGE III (T3N1M0)   • Depression    • Disease of thyroid gland     HYPOTHYROIDISM   • Diverticula of colon    • Enlarged prostate    • H/O allergy to eggs    • Headache    • Hyperlipidemia    • Hypertension    • Hyperthyroidism    • Obesity    • Pneumonia 11/2015   • PONV (postoperative nausea and vomiting)    • Sick sinus syndrome (HCC)     S/P LEFT CHEST PACEMAKER IMPLANTATION   • Splenomegaly       Allergies   Allergen Reactions   • Eggs Or Egg-Derived Products Nausea And Vomiting      Social History     Socioeconomic History   • Marital status:      Spouse name: Carmelita   • Number of children: 2   Tobacco Use   • Smoking status: Never   • Smokeless tobacco: Never   • Tobacco comments:     caffeine use   Vaping Use   • Vaping Use: Never used   Substance and Sexual Activity   • Alcohol use: Yes     Comment: occasional   • Drug use: No   • Sexual activity: Defer        Current Outpatient Medications:   •  amLODIPine (NORVASC) 5 MG tablet, Take  1 tablet by mouth Daily., Disp: 90 tablet, Rfl: 3  •  aspirin 81 MG chewable tablet, Chew 81 mg daily., Disp: , Rfl:   •  benazepril (LOTENSIN) 10 MG tablet, Take 1 tablet by mouth Daily., Disp: 90 tablet, Rfl: 3  •  Cholecalciferol (VITAMIN D PO), Take  by mouth., Disp: , Rfl:   •  ibuprofen (ADVIL,MOTRIN) 600 MG tablet, Take 1 tablet by mouth Every 6 (Six) Hours As Needed for Moderate Pain ., Disp: 30 tablet, Rfl: 0  •  levothyroxine (SYNTHROID, LEVOTHROID) 175 MCG tablet, Take 1 tablet by mouth Daily., Disp: 90 tablet, Rfl: 3  •  metFORMIN (GLUCOPHAGE) 500 MG tablet, Take 2 tablets by mouth 2 (Two) Times a Day With Meals., Disp: 360 tablet, Rfl: 3  •  pioglitazone (Actos) 30 MG tablet, Take 1 tablet by mouth Daily., Disp: 90 tablet, Rfl: 3  •  rosuvastatin (CRESTOR) 10 MG tablet, Take 1 tablet by mouth Daily., Disp: 90 tablet, Rfl: 3  •  sertraline (ZOLOFT) 100 MG tablet, Take 1.5 tablets by mouth Daily., Disp: 135 tablet, Rfl: 3     Objective     History of Present Illness     Review of Systems    Physical Exam  Vitals and nursing note reviewed.   Constitutional:       General: He is not in acute distress.     Appearance: Normal appearance. He is not ill-appearing.   HENT:      Head: Normocephalic and atraumatic.   Cardiovascular:      Rate and Rhythm: Normal rate and regular rhythm.      Heart sounds: Normal heart sounds.   Pulmonary:      Effort: Pulmonary effort is normal. No respiratory distress.      Breath sounds: Normal breath sounds. No wheezing or rales.   Musculoskeletal:         General: Normal range of motion.   Skin:     General: Skin is warm and dry.   Neurological:      General: No focal deficit present.      Mental Status: He is alert and oriented to person, place, and time.   Psychiatric:         Mood and Affect: Mood normal.         Behavior: Behavior normal.         ASSESSMENT CMP has an improved sugar of 120 and was otherwise essentially normal.  Hemoglobin A1c has improved to 6.5.  Lipid  panel is stable with total cholesterol 158, HDL 50 LDL 90.  PSA is stable at 3.0.  TSH and free T4 were both normal.  #1-hypertension controlled on medication  #2-hyperlipidemia controlled on medication #3-hypothyroidism, euthyroid on replacement  #4-diabetes mellitus type 2 with improved acceptable hemoglobin A1c  #5-obesity with slight weight gain         Problems Addressed this Visit        Cardiac and Vasculature    BP (high blood pressure) - Primary    Relevant Medications    benazepril (LOTENSIN) 10 MG tablet    amLODIPine (NORVASC) 5 MG tablet    History of permanent cardiac pacemaker placement    Hyperlipidemia    Relevant Medications    rosuvastatin (CRESTOR) 10 MG tablet       Coag and Thromboembolic    Long-term (current) use of anticoagulants       Endocrine and Metabolic    Disease of thyroid gland    Relevant Medications    levothyroxine (SYNTHROID, LEVOTHROID) 175 MCG tablet    Type 2 diabetes mellitus without complication (HCC)    Relevant Medications    metFORMIN (GLUCOPHAGE) 500 MG tablet    Obesity (BMI 30.0-34.9)   Diagnoses       Codes Comments    Primary hypertension    -  Primary ICD-10-CM: I10  ICD-9-CM: 401.9     History of permanent cardiac pacemaker placement     ICD-10-CM: Z95.0  ICD-9-CM: V45.01     Hyperlipidemia, unspecified hyperlipidemia type     ICD-10-CM: E78.5  ICD-9-CM: 272.4     Long-term (current) use of anticoagulants     ICD-10-CM: Z79.01  ICD-9-CM: V58.61     Disease of thyroid gland     ICD-10-CM: E07.9  ICD-9-CM: 246.9     Type 2 diabetes mellitus without complication, without long-term current use of insulin (HCC)     ICD-10-CM: E11.9  ICD-9-CM: 250.00     Obesity (BMI 30.0-34.9)     ICD-10-CM: E66.9  ICD-9-CM: 278.00           PLAN patient received a flu shot today and I recommended COVID-19 booster.  He also needs to get a Tdap immunization at the pharmacy.  He will continue current medicines as now and I encouraged him to watch dietary carbs and sweets and see if he  can get his weight down a little.  I plan on rechecking him in 6 months with laboratory studies    There are no Patient Instructions on file for this visit.  No follow-ups on file.

## 2022-11-29 ENCOUNTER — OFFICE VISIT (OUTPATIENT)
Dept: FAMILY MEDICINE CLINIC | Facility: CLINIC | Age: 72
End: 2022-11-29

## 2022-11-29 VITALS
OXYGEN SATURATION: 95 % | DIASTOLIC BLOOD PRESSURE: 88 MMHG | TEMPERATURE: 99 F | SYSTOLIC BLOOD PRESSURE: 134 MMHG | HEART RATE: 86 BPM

## 2022-11-29 DIAGNOSIS — J10.1 INFLUENZA A: Primary | ICD-10-CM

## 2022-11-29 DIAGNOSIS — H66.001 ACUTE SUPPURATIVE OTITIS MEDIA OF RIGHT EAR WITHOUT SPONTANEOUS RUPTURE OF TYMPANIC MEMBRANE, RECURRENCE NOT SPECIFIED: ICD-10-CM

## 2022-11-29 DIAGNOSIS — R05.9 COUGH, UNSPECIFIED TYPE: ICD-10-CM

## 2022-11-29 DIAGNOSIS — R50.9 FEVER, UNSPECIFIED FEVER CAUSE: ICD-10-CM

## 2022-11-29 DIAGNOSIS — R11.0 NAUSEA: ICD-10-CM

## 2022-11-29 LAB
EXPIRATION DATE: ABNORMAL
FLUAV AG UPPER RESP QL IA.RAPID: DETECTED
FLUBV AG UPPER RESP QL IA.RAPID: NOT DETECTED
INTERNAL CONTROL: ABNORMAL
Lab: ABNORMAL
SARS-COV-2 RNA RESP QL NAA+PROBE: NOT DETECTED

## 2022-11-29 PROCEDURE — 87428 SARSCOV & INF VIR A&B AG IA: CPT | Performed by: NURSE PRACTITIONER

## 2022-11-29 PROCEDURE — 99213 OFFICE O/P EST LOW 20 MIN: CPT | Performed by: NURSE PRACTITIONER

## 2022-11-29 RX ORDER — CEFDINIR 300 MG/1
300 CAPSULE ORAL 2 TIMES DAILY
Qty: 14 CAPSULE | Refills: 0 | Status: SHIPPED | OUTPATIENT
Start: 2022-11-29 | End: 2022-12-06

## 2022-11-29 RX ORDER — OSELTAMIVIR PHOSPHATE 75 MG/1
75 CAPSULE ORAL 2 TIMES DAILY
Qty: 10 CAPSULE | Refills: 0 | Status: SHIPPED | OUTPATIENT
Start: 2022-11-29 | End: 2022-12-04

## 2022-11-29 RX ORDER — ONDANSETRON 4 MG/1
4 TABLET, ORALLY DISINTEGRATING ORAL EVERY 8 HOURS PRN
Qty: 12 TABLET | Refills: 0 | Status: SHIPPED | OUTPATIENT
Start: 2022-11-29

## 2022-11-29 RX ORDER — BENZONATATE 100 MG/1
100 CAPSULE ORAL 3 TIMES DAILY PRN
Qty: 30 CAPSULE | Refills: 0 | Status: SHIPPED | OUTPATIENT
Start: 2022-11-29

## 2022-11-29 NOTE — PROGRESS NOTES
Subjective     Marco Antonio Kendrick is a 72 y.o.. male.     URI   This is a new problem. Episode onset: 3 days. Associated symptoms include congestion, coughing, ear pain, headaches, nausea and rhinorrhea. Pertinent negatives include no diarrhea, sore throat or vomiting.       The following portions of the patient's history were reviewed and updated as appropriate: allergies, current medications, past family history, past medical history, past social history, past surgical history and problem list.    Past Medical History:   Diagnosis Date   • Arthritis    • Bipolar disorder (HCC)    • Bladder wall thickening     MILD NON-SPECIFIC URINARY BLADDER....FOLLOWED BY DR COBURN   • Bone marrow suppression     SIGNIFICANT MYELOSUPRESSION FROM FOLFOX-4 CHEMOTHERAPY   • Cancer of sigmoid colon (HCC)     STAGE III (T3N1M0)   • Depression    • Disease of thyroid gland     HYPOTHYROIDISM   • Diverticula of colon    • Enlarged prostate    • H/O allergy to eggs    • Headache    • Hyperlipidemia    • Hypertension    • Hyperthyroidism    • Obesity    • Pneumonia 11/2015   • PONV (postoperative nausea and vomiting)    • Sick sinus syndrome (HCC)     S/P LEFT CHEST PACEMAKER IMPLANTATION   • Splenomegaly        Past Surgical History:   Procedure Laterality Date   • APPENDECTOMY     • COLON SURGERY  2011    RESECTION   • COLONOSCOPY  01/2015    DR. CAGE   • COLONOSCOPY N/A 7/25/2016    Procedure: COLONOSCOPY to cecum and ti;  Surgeon: Leoenl Cage MD;  Location: [facility] ENDOSCOPY;  Service:    • COLONOSCOPY N/A 6/16/2021    Procedure: COLONOSCOPY TO CECUM (SIGMOID RESECTION);  Surgeon: Leonel Cage MD;  Location: [facility] ENDOSCOPY;  Service: Gastroenterology;  Laterality: N/A;  pre:  personal hx of polyps and colon ca  post: DIVERTICULOSIS, HEMORRHOIDS   • CYSTOSCOPY      BY DR COBURN   • PACEMAKER IMPLANTATION Left        Review of Systems   Constitutional: Positive for fever (100.8 yesterday).   HENT: Positive for  congestion, ear pain, postnasal drip and rhinorrhea. Negative for sore throat.    Respiratory: Positive for cough.    Gastrointestinal: Positive for nausea. Negative for diarrhea and vomiting.   Neurological: Positive for headaches.       Allergies   Allergen Reactions   • Eggs Or Egg-Derived Products Nausea And Vomiting       Objective     Vitals:    11/29/22 1342   BP: 134/88   Pulse: 86   Temp: 99 °F (37.2 °C)   TempSrc: Oral   SpO2: 95%     There is no height or weight on file to calculate BMI.    Physical Exam  Vitals reviewed.   Constitutional:       Appearance: He is well-developed.   HENT:      Head: Normocephalic and atraumatic.      Right Ear: A middle ear effusion (white, hazy) is present. Tympanic membrane is erythematous.      Left Ear: Tympanic membrane normal. Tympanic membrane is not erythematous.      Nose: Congestion present.      Mouth/Throat:      Mouth: Mucous membranes are moist.      Pharynx: Oropharyngeal exudate (thick pnd) and posterior oropharyngeal erythema (slight) present. No pharyngeal swelling.   Eyes:      Conjunctiva/sclera: Conjunctivae normal.      Pupils: Pupils are equal, round, and reactive to light.   Cardiovascular:      Rate and Rhythm: Normal rate and regular rhythm.      Heart sounds: No murmur heard.  Pulmonary:      Effort: Pulmonary effort is normal.      Breath sounds: No wheezing, rhonchi or rales.   Musculoskeletal:         General: Normal range of motion.   Lymphadenopathy:      Cervical: No cervical adenopathy.   Skin:     General: Skin is warm and dry.   Neurological:      Mental Status: He is alert and oriented to person, place, and time.           Current Outpatient Medications:   •  amLODIPine (NORVASC) 5 MG tablet, Take 1 tablet by mouth Daily., Disp: 90 tablet, Rfl: 3  •  aspirin 81 MG chewable tablet, Chew 81 mg daily., Disp: , Rfl:   •  benazepril (LOTENSIN) 10 MG tablet, Take 1 tablet by mouth Daily., Disp: 90 tablet, Rfl: 3  •  Cholecalciferol (VITAMIN D  PO), Take  by mouth., Disp: , Rfl:   •  ibuprofen (ADVIL,MOTRIN) 600 MG tablet, Take 1 tablet by mouth Every 6 (Six) Hours As Needed for Moderate Pain ., Disp: 30 tablet, Rfl: 0  •  levothyroxine (SYNTHROID, LEVOTHROID) 175 MCG tablet, Take 1 tablet by mouth Daily., Disp: 90 tablet, Rfl: 3  •  metFORMIN (GLUCOPHAGE) 500 MG tablet, Take 2 tablets by mouth 2 (Two) Times a Day With Meals., Disp: 360 tablet, Rfl: 3  •  pioglitazone (Actos) 30 MG tablet, Take 1 tablet by mouth Daily., Disp: 90 tablet, Rfl: 3  •  rosuvastatin (CRESTOR) 10 MG tablet, Take 1 tablet by mouth Daily., Disp: 90 tablet, Rfl: 3  •  sertraline (ZOLOFT) 100 MG tablet, Take 1.5 tablets by mouth Daily., Disp: 135 tablet, Rfl: 3  •  benzonatate (Tessalon Perles) 100 MG capsule, Take 1 capsule by mouth 3 (Three) Times a Day As Needed for Cough., Disp: 30 capsule, Rfl: 0  •  cefdinir (OMNICEF) 300 MG capsule, Take 1 capsule by mouth 2 (Two) Times a Day for 7 days., Disp: 14 capsule, Rfl: 0  •  ondansetron ODT (ZOFRAN-ODT) 4 MG disintegrating tablet, Place 1 tablet on the tongue Every 8 (Eight) Hours As Needed for Nausea or Vomiting., Disp: 12 tablet, Rfl: 0  •  oseltamivir (Tamiflu) 75 MG capsule, Take 1 capsule by mouth 2 (Two) Times a Day for 5 days., Disp: 10 capsule, Rfl: 0    Recent Results (from the past 168 hour(s))   Covid-19 + Flu A&B AG, Veritor    Collection Time: 11/29/22  2:02 PM    Specimen: Swab   Result Value Ref Range    COVID19 Not Detected Not Detected - Ref. Range    Influenza A Antigen FRANK Detected (A) Not Detected    Influenza B Antigen FRANK Not Detected Not Detected    Internal Control Passed Passed    Lot Number [id number]     Expiration Date 11/12/2023          Diagnoses and all orders for this visit:    1. Influenza A (Primary)  -     oseltamivir (Tamiflu) 75 MG capsule; Take 1 capsule by mouth 2 (Two) Times a Day for 5 days.  Dispense: 10 capsule; Refill: 0    2. Acute suppurative otitis media of right ear without spontaneous  rupture of tympanic membrane, recurrence not specified  -     cefdinir (OMNICEF) 300 MG capsule; Take 1 capsule by mouth 2 (Two) Times a Day for 7 days.  Dispense: 14 capsule; Refill: 0    3. Cough, unspecified type  -     Covid-19 + Flu A&B AG, Veritor  -     benzonatate (Tessalon Perles) 100 MG capsule; Take 1 capsule by mouth 3 (Three) Times a Day As Needed for Cough.  Dispense: 30 capsule; Refill: 0    4. Fever, unspecified fever cause  -     Covid-19 + Flu A&B AG, Veritor    5. Nausea  -     ondansetron ODT (ZOFRAN-ODT) 4 MG disintegrating tablet; Place 1 tablet on the tongue Every 8 (Eight) Hours As Needed for Nausea or Vomiting.  Dispense: 12 tablet; Refill: 0        Patient Instructions   Drink plenty of fluids-water preferably, eat a heart healthy diet, get plenty of sleep and do warm salt water gargles twice a day until feeling better; Pt verb. Understanding.       Return if symptoms worsen or fail to improve, for Dr. Duenas as needed/as recommended.

## 2023-05-10 ENCOUNTER — OFFICE VISIT (OUTPATIENT)
Dept: FAMILY MEDICINE CLINIC | Facility: CLINIC | Age: 73
End: 2023-05-10
Payer: MEDICARE

## 2023-05-10 VITALS
RESPIRATION RATE: 16 BRPM | BODY MASS INDEX: 35.65 KG/M2 | HEART RATE: 70 BPM | WEIGHT: 249 LBS | SYSTOLIC BLOOD PRESSURE: 128 MMHG | HEIGHT: 70 IN | OXYGEN SATURATION: 99 % | DIASTOLIC BLOOD PRESSURE: 72 MMHG | TEMPERATURE: 96.9 F

## 2023-05-10 DIAGNOSIS — U07.1 COVID-19 VIRUS INFECTION: Primary | ICD-10-CM

## 2023-05-10 DIAGNOSIS — R05.1 ACUTE COUGH: ICD-10-CM

## 2023-05-10 LAB
EXPIRATION DATE: ABNORMAL
FLUAV AG UPPER RESP QL IA.RAPID: NOT DETECTED
FLUBV AG UPPER RESP QL IA.RAPID: NOT DETECTED
INTERNAL CONTROL: ABNORMAL
Lab: ABNORMAL
SARS-COV-2 AG UPPER RESP QL IA.RAPID: DETECTED

## 2023-05-10 RX ORDER — BENZONATATE 100 MG/1
100 CAPSULE ORAL 3 TIMES DAILY PRN
Qty: 30 CAPSULE | Refills: 0 | Status: SHIPPED | OUTPATIENT
Start: 2023-05-10

## 2023-05-10 RX ORDER — FLUTICASONE PROPIONATE 50 MCG
2 SPRAY, SUSPENSION (ML) NASAL DAILY
Qty: 16 G | Refills: 0 | Status: SHIPPED | OUTPATIENT
Start: 2023-05-10 | End: 2023-05-24

## 2023-05-10 RX ORDER — AZELASTINE 1 MG/ML
1 SPRAY, METERED NASAL 2 TIMES DAILY
Qty: 1 EACH | Refills: 0 | Status: SHIPPED | OUTPATIENT
Start: 2023-05-10 | End: 2023-05-24

## 2023-05-10 NOTE — PATIENT INSTRUCTIONS
Detail discussion of paxlovid; that it is an investigation medication, potential adverse effects/side effects, medication interactions, contraindications and warnings and alternative options. Discussed pt's lab results (11/7/22 labs Bun 14, creat 1.19, EgFr 64, AST 16, ALT 14) and that paxlovid metabolized though liver and excreted partially through kidneys. Discussed medication interactions and informed to stop rosuvastatin and take 1/2 dose of amlodipine (taken 2.5 mg amlodipine daily) while taking paxlovid. Pt stating he wants paxlovid and orders placed.       Drink plenty of fluids-water preferably, eat a heart healthy diet, get plenty of sleep and do warm salt water gargles twice a day until feeling better; pt informed to stay in quarantine for 5 days. Pt verb. Understanding.

## 2023-05-10 NOTE — PROGRESS NOTES
Subjective     Marco Antonio Kendrick is a 72 y.o.. male.     URI   This is a new problem. Episode onset: 5 days. The problem has been waxing and waning. Maximum temperature: 100.2 yesterday. Associated symptoms include congestion, coughing, ear pain, headaches, rhinorrhea and a sore throat. Pertinent negatives include no diarrhea, dysuria, nausea or vomiting.       The following portions of the patient's history were reviewed and updated as appropriate: allergies, current medications, past family history, past medical history, past social history, past surgical history and problem list.    Past Medical History:   Diagnosis Date   • Arthritis    • Bipolar disorder    • Bladder wall thickening     MILD NON-SPECIFIC URINARY BLADDER....FOLLOWED BY DR COBURN   • Bone marrow suppression     SIGNIFICANT MYELOSUPRESSION FROM FOLFOX-4 CHEMOTHERAPY   • Cancer of sigmoid colon     STAGE III (T3N1M0)   • Depression    • Disease of thyroid gland     HYPOTHYROIDISM   • Diverticula of colon    • Enlarged prostate    • H/O allergy to eggs    • Headache    • Hyperlipidemia    • Hypertension    • Hyperthyroidism    • Obesity    • Pneumonia 11/2015   • PONV (postoperative nausea and vomiting)    • Sick sinus syndrome     S/P LEFT CHEST PACEMAKER IMPLANTATION   • Splenomegaly        Past Surgical History:   Procedure Laterality Date   • APPENDECTOMY     • COLON SURGERY  2011    RESECTION   • COLONOSCOPY  01/2015    DR. CAGE   • COLONOSCOPY N/A 7/25/2016    Procedure: COLONOSCOPY to cecum and ti;  Surgeon: Leonel Cage MD;  Location: [facility] ENDOSCOPY;  Service:    • COLONOSCOPY N/A 6/16/2021    Procedure: COLONOSCOPY TO CECUM (SIGMOID RESECTION);  Surgeon: Leonel Cage MD;  Location: [facility] ENDOSCOPY;  Service: Gastroenterology;  Laterality: N/A;  pre:  personal hx of polyps and colon ca  post: DIVERTICULOSIS, HEMORRHOIDS   • CYSTOSCOPY      BY DR COBURN   • PACEMAKER IMPLANTATION Left        Review of Systems  "  Constitutional: Positive for fever.   HENT: Positive for congestion, ear pain, postnasal drip, rhinorrhea and sore throat.    Respiratory: Positive for cough. Negative for shortness of breath.    Gastrointestinal: Negative for diarrhea, nausea and vomiting.   Genitourinary: Negative for dysuria, frequency and urgency.        Blood in urine on Saturday night, none since   Musculoskeletal: Positive for arthralgias.   Neurological: Positive for headaches.       Allergies   Allergen Reactions   • Eggs Or Egg-Derived Products Nausea And Vomiting       Objective     Vitals:    05/10/23 1421   BP: 128/72   Pulse: 70   Resp: 16   Temp: 96.9 °F (36.1 °C)   SpO2: 99%   Weight: 113 kg (249 lb)   Height: 177.8 cm (70\")     Body mass index is 35.73 kg/m².    Physical Exam  Vitals reviewed.   Constitutional:       Appearance: He is well-developed.   HENT:      Head: Normocephalic and atraumatic.      Right Ear: A middle ear effusion (clear fluid) is present. Tympanic membrane is not erythematous.      Left Ear: A middle ear effusion (clear fluid) is present. Tympanic membrane is not erythematous.      Nose: Congestion present.      Mouth/Throat:      Mouth: Mucous membranes are moist.      Pharynx: No pharyngeal swelling, oropharyngeal exudate or posterior oropharyngeal erythema.   Eyes:      Conjunctiva/sclera: Conjunctivae normal.      Pupils: Pupils are equal, round, and reactive to light.   Cardiovascular:      Rate and Rhythm: Normal rate and regular rhythm.      Heart sounds: No murmur heard.  Pulmonary:      Effort: Pulmonary effort is normal. No accessory muscle usage or respiratory distress.      Breath sounds: No wheezing, rhonchi or rales.   Musculoskeletal:         General: Normal range of motion.   Lymphadenopathy:      Cervical: No cervical adenopathy.   Skin:     General: Skin is warm and dry.   Neurological:      Mental Status: He is alert and oriented to person, place, and time.           Current Outpatient " Medications:   •  amLODIPine (NORVASC) 5 MG tablet, Take 1 tablet by mouth Daily., Disp: 90 tablet, Rfl: 3  •  aspirin 81 MG chewable tablet, Chew 1 tablet Daily., Disp: , Rfl:   •  benazepril (LOTENSIN) 10 MG tablet, Take 1 tablet by mouth Daily., Disp: 90 tablet, Rfl: 3  •  Cholecalciferol (VITAMIN D PO), Take  by mouth., Disp: , Rfl:   •  ibuprofen (ADVIL,MOTRIN) 600 MG tablet, Take 1 tablet by mouth Every 6 (Six) Hours As Needed for Moderate Pain ., Disp: 30 tablet, Rfl: 0  •  levothyroxine (SYNTHROID, LEVOTHROID) 175 MCG tablet, Take 1 tablet by mouth Daily., Disp: 90 tablet, Rfl: 3  •  metFORMIN (GLUCOPHAGE) 500 MG tablet, Take 2 tablets by mouth 2 (Two) Times a Day With Meals., Disp: 360 tablet, Rfl: 3  •  ondansetron ODT (ZOFRAN-ODT) 4 MG disintegrating tablet, Place 1 tablet on the tongue Every 8 (Eight) Hours As Needed for Nausea or Vomiting., Disp: 12 tablet, Rfl: 0  •  pioglitazone (Actos) 30 MG tablet, Take 1 tablet by mouth Daily., Disp: 90 tablet, Rfl: 3  •  rosuvastatin (CRESTOR) 10 MG tablet, Take 1 tablet by mouth Daily., Disp: 90 tablet, Rfl: 3  •  sertraline (ZOLOFT) 100 MG tablet, Take 1.5 tablets by mouth Daily., Disp: 135 tablet, Rfl: 3  •  azelastine (ASTELIN) 0.1 % nasal spray, 1 spray into the nostril(s) as directed by provider 2 (Two) Times a Day for 14 days. Use in each nostril as directed, Disp: 1 each, Rfl: 0  •  benzonatate (Tessalon Perles) 100 MG capsule, Take 1 capsule by mouth 3 (Three) Times a Day As Needed for Cough., Disp: 30 capsule, Rfl: 0  •  fluticasone (FLONASE) 50 MCG/ACT nasal spray, 2 sprays into the nostril(s) as directed by provider Daily for 14 days., Disp: 16 g, Rfl: 0  •  Nirmatrelvir&Ritonavir 300/100 (PAXLOVID) 20 x 150 MG & 10 x 100MG tablet therapy pack tablet, Take 3 tablets by mouth 2 (Two) Times a Day for 5 days. Indications: weight 249 lbs; 11/7/22 labs Bun 14, creat 1.19, EgFr 64, AST 16, ALT 14, Disp: 30 tablet, Rfl: 0    Recent Results (from the past 2016  hour(s))   Covid-19 + Flu A&B AG, Veritor    Collection Time: 05/10/23  2:45 PM    Specimen: Swab   Result Value Ref Range    SARS Antigen Detected (A) Not Detected, Presumptive Negative    Influenza A Antigen FRANK Not Detected Not Detected    Influenza B Antigen FRANK Not Detected Not Detected    Internal Control Passed Passed    Lot Number [id number]     Expiration Date 03-              Diagnoses and all orders for this visit:    1. COVID-19 virus infection (Primary)  -     azelastine (ASTELIN) 0.1 % nasal spray; 1 spray into the nostril(s) as directed by provider 2 (Two) Times a Day for 14 days. Use in each nostril as directed  Dispense: 1 each; Refill: 0  -     fluticasone (FLONASE) 50 MCG/ACT nasal spray; 2 sprays into the nostril(s) as directed by provider Daily for 14 days.  Dispense: 16 g; Refill: 0  -     Nirmatrelvir&Ritonavir 300/100 (PAXLOVID) 20 x 150 MG & 10 x 100MG tablet therapy pack tablet; Take 3 tablets by mouth 2 (Two) Times a Day for 5 days. Indications: weight 249 lbs; 11/7/22 labs Bun 14, creat 1.19, EgFr 64, AST 16, ALT 14  Dispense: 30 tablet; Refill: 0    2. Acute cough  -     Covid-19 + Flu A&B AG, Veritor  -     benzonatate (Tessalon Perles) 100 MG capsule; Take 1 capsule by mouth 3 (Three) Times a Day As Needed for Cough.  Dispense: 30 capsule; Refill: 0        Patient Instructions   Detail discussion of paxlovid; that it is an investigation medication, potential adverse effects/side effects, medication interactions, contraindications and warnings and alternative options. Discussed pt's lab results (11/7/22 labs Bun 14, creat 1.19, EgFr 64, AST 16, ALT 14) and that paxlovid metabolized though liver and excreted partially through kidneys. Discussed medication interactions and informed to stop rosuvastatin and take 1/2 dose of amlodipine (taken 2.5 mg amlodipine daily) while taking paxlovid. Pt stating he wants paxlovid and orders placed.       Drink plenty of fluids-water preferably,  eat a heart healthy diet, get plenty of sleep and do warm salt water gargles twice a day until feeling better; pt informed to stay in quarantine for 5 days. Pt verb. Understanding.       Return if symptoms worsen or fail to improve, for Dr. Duenas as needed/as recommended.

## 2023-08-03 ENCOUNTER — TELEPHONE (OUTPATIENT)
Dept: FAMILY MEDICINE CLINIC | Facility: CLINIC | Age: 73
End: 2023-08-03
Payer: MEDICARE

## 2023-08-17 ENCOUNTER — OFFICE VISIT (OUTPATIENT)
Dept: FAMILY MEDICINE CLINIC | Facility: CLINIC | Age: 73
End: 2023-08-17
Payer: MEDICARE

## 2023-08-17 VITALS
WEIGHT: 251 LBS | HEART RATE: 83 BPM | HEIGHT: 70 IN | SYSTOLIC BLOOD PRESSURE: 128 MMHG | BODY MASS INDEX: 35.93 KG/M2 | OXYGEN SATURATION: 97 % | DIASTOLIC BLOOD PRESSURE: 80 MMHG

## 2023-08-17 DIAGNOSIS — E78.5 HYPERLIPIDEMIA, UNSPECIFIED HYPERLIPIDEMIA TYPE: Primary | ICD-10-CM

## 2023-08-17 DIAGNOSIS — Z95.0 HISTORY OF PERMANENT CARDIAC PACEMAKER PLACEMENT: ICD-10-CM

## 2023-08-17 DIAGNOSIS — I10 PRIMARY HYPERTENSION: ICD-10-CM

## 2023-08-17 DIAGNOSIS — E07.9 DISEASE OF THYROID GLAND: ICD-10-CM

## 2023-08-17 DIAGNOSIS — E66.9 OBESITY (BMI 30.0-34.9): ICD-10-CM

## 2023-08-17 DIAGNOSIS — E11.9 TYPE 2 DIABETES MELLITUS WITHOUT COMPLICATION, WITHOUT LONG-TERM CURRENT USE OF INSULIN: ICD-10-CM

## 2023-08-17 RX ORDER — PIOGLITAZONEHYDROCHLORIDE 30 MG/1
30 TABLET ORAL DAILY
Qty: 90 TABLET | Refills: 3 | Status: SHIPPED | OUTPATIENT
Start: 2023-08-17

## 2023-08-17 NOTE — PROGRESS NOTES
Subjective   Marco Antonio Kendrick is a 72 y.o. male. Patient is here today for follow-up on his hypertension, hyperlipidemia, diabetes mellitus type 2, hypothyroidism, obesity and history of cardiac pacemaker.  He generally is stable and feels well and has no acute complaints.  Chief Complaint   Patient presents with    Hypertension    Diabetes          Vitals:    08/17/23 1310   BP: 128/80   Pulse: 83   SpO2: 97%     Body mass index is 36.01 kg/mý.  The following portions of the patient's history were reviewed and updated as appropriate: allergies, current medications, past family history, past medical history, past social history, past surgical history and problem list.    Past Medical History:   Diagnosis Date    Arthritis     Bipolar disorder     Bladder wall thickening     MILD NON-SPECIFIC URINARY BLADDER....FOLLOWED BY DR COBURN    Bone marrow suppression     SIGNIFICANT MYELOSUPRESSION FROM FOLFOX-4 CHEMOTHERAPY    Cancer of sigmoid colon     STAGE III (T3N1M0)    Depression     Disease of thyroid gland     HYPOTHYROIDISM    Diverticula of colon     Enlarged prostate     H/O allergy to eggs     Headache     Hyperlipidemia     Hypertension     Hyperthyroidism     Obesity     Pneumonia 11/2015    PONV (postoperative nausea and vomiting)     Sick sinus syndrome     S/P LEFT CHEST PACEMAKER IMPLANTATION    Splenomegaly       Allergies   Allergen Reactions    Eggs Or Egg-Derived Products Nausea And Vomiting      Social History     Socioeconomic History    Marital status:      Spouse name: Carmelita    Number of children: 2   Tobacco Use    Smoking status: Never    Smokeless tobacco: Never    Tobacco comments:     caffeine use   Vaping Use    Vaping Use: Never used   Substance and Sexual Activity    Alcohol use: Yes     Comment: occasional    Drug use: No    Sexual activity: Defer        Current Outpatient Medications:     amLODIPine (NORVASC) 5 MG tablet, Take 1 tablet by mouth Daily., Disp: 90 tablet, Rfl: 3     aspirin 81 MG chewable tablet, Chew 1 tablet Daily., Disp: , Rfl:     benazepril (LOTENSIN) 10 MG tablet, Take 1 tablet by mouth Daily., Disp: 90 tablet, Rfl: 3    benzonatate (Tessalon Perles) 100 MG capsule, Take 1 capsule by mouth 3 (Three) Times a Day As Needed for Cough., Disp: 30 capsule, Rfl: 0    Cholecalciferol (VITAMIN D PO), Take  by mouth., Disp: , Rfl:     ibuprofen (ADVIL,MOTRIN) 600 MG tablet, Take 1 tablet by mouth Every 6 (Six) Hours As Needed for Moderate Pain ., Disp: 30 tablet, Rfl: 0    levothyroxine (SYNTHROID, LEVOTHROID) 175 MCG tablet, Take 1 tablet by mouth Daily., Disp: 90 tablet, Rfl: 3    metFORMIN (GLUCOPHAGE) 500 MG tablet, Take 2 tablets by mouth 2 (Two) Times a Day With Meals., Disp: 360 tablet, Rfl: 3    ondansetron ODT (ZOFRAN-ODT) 4 MG disintegrating tablet, Place 1 tablet on the tongue Every 8 (Eight) Hours As Needed for Nausea or Vomiting., Disp: 12 tablet, Rfl: 0    pioglitazone (ACTOS) 30 MG tablet, Take 1 tablet by mouth Daily., Disp: 90 tablet, Rfl: 3    rosuvastatin (CRESTOR) 10 MG tablet, Take 1 tablet by mouth Daily., Disp: 90 tablet, Rfl: 3    sertraline (ZOLOFT) 100 MG tablet, Take 1.5 tablets by mouth Daily., Disp: 135 tablet, Rfl: 3    fluticasone (FLONASE) 50 MCG/ACT nasal spray, 2 sprays into the nostril(s) as directed by provider Daily for 14 days., Disp: 16 g, Rfl: 0     Objective     History of Present Illness     Review of Systems    Physical Exam  Vitals and nursing note reviewed.   Constitutional:       General: He is not in acute distress.     Appearance: Normal appearance. He is obese. He is not ill-appearing.   HENT:      Head: Normocephalic and atraumatic.   Cardiovascular:      Rate and Rhythm: Normal rate and regular rhythm.      Heart sounds: Normal heart sounds.   Pulmonary:      Effort: Pulmonary effort is normal. No respiratory distress.      Breath sounds: Normal breath sounds. No wheezing or rales.   Skin:     General: Skin is warm and dry.    Neurological:      General: No focal deficit present.      Mental Status: He is alert and oriented to person, place, and time.   Psychiatric:         Mood and Affect: Mood normal.         Behavior: Behavior normal.       Assessment    ASSESSMENT CBC had a minimally low hemoglobin of 12.6 but was otherwise normal.  Urine microalbumin remains low normal and stable.  CMP had a sugar of 108 and was otherwise normal and hemoglobin A1c is slightly improved and stable at 6.4.  Lipid panel has total cholesterol 150, HDL 42 and LDL 79.  TSH was minimally high at 6.46 but free T4 was normal and stable and clinically the patient's euthyroid on his current dose  #1-hypertension controlled on medication  #2-hyperlipidemia controlled on medication  #3-diabetes mellitus type 2 with improved acceptable hemoglobin A1c  #4-hypothyroidism, euthyroid on replacement  #5-obesity with no significant weight loss  #6-history of cardiac pacemaker, stable    Problems Addressed this Visit    None  Diagnoses    None.         PLAN the patient will continue current medicines as now.  I did recommend a tetanus immunization, the shingles immunizations and a COVID-19 booster as well as flu and RSV vaccinations in the fall.  Patient was encouraged to lose weight and watch dietary carbs and sweets and can be rechecked in 6 months with a CBC, CMP, hemoglobin A1c, lipid panel, TSH and free T4, PSA    There are no Patient Instructions on file for this visit.  No follow-ups on file.

## 2023-10-23 RX ORDER — AMLODIPINE BESYLATE 5 MG/1
5 TABLET ORAL DAILY
Qty: 90 TABLET | Refills: 10 | Status: SHIPPED | OUTPATIENT
Start: 2023-10-23

## 2023-10-23 RX ORDER — LEVOTHYROXINE SODIUM 175 UG/1
175 TABLET ORAL DAILY
Qty: 90 TABLET | Refills: 10 | Status: SHIPPED | OUTPATIENT
Start: 2023-10-23

## 2024-02-07 DIAGNOSIS — I10 PRIMARY HYPERTENSION: Primary | ICD-10-CM

## 2024-02-07 DIAGNOSIS — E11.9 TYPE 2 DIABETES MELLITUS WITHOUT COMPLICATION, WITHOUT LONG-TERM CURRENT USE OF INSULIN: ICD-10-CM

## 2024-02-07 DIAGNOSIS — Z12.5 SCREENING PSA (PROSTATE SPECIFIC ANTIGEN): ICD-10-CM

## 2024-02-07 DIAGNOSIS — E07.9 DISEASE OF THYROID GLAND: ICD-10-CM

## 2024-02-07 DIAGNOSIS — E78.5 HYPERLIPIDEMIA, UNSPECIFIED HYPERLIPIDEMIA TYPE: ICD-10-CM

## 2024-02-15 LAB
ALBUMIN SERPL-MCNC: 4.2 G/DL (ref 3.5–5.2)
ALBUMIN/GLOB SERPL: 1.6 G/DL
ALP SERPL-CCNC: 60 U/L (ref 39–117)
ALT SERPL-CCNC: 10 U/L (ref 1–41)
AST SERPL-CCNC: 14 U/L (ref 1–40)
BASOPHILS # BLD AUTO: 0.08 10*3/MM3 (ref 0–0.2)
BASOPHILS NFR BLD AUTO: 1.5 % (ref 0–1.5)
BILIRUB SERPL-MCNC: 0.4 MG/DL (ref 0–1.2)
BUN SERPL-MCNC: 14 MG/DL (ref 8–23)
BUN/CREAT SERPL: 11.9 (ref 7–25)
CALCIUM SERPL-MCNC: 9.1 MG/DL (ref 8.6–10.5)
CHLORIDE SERPL-SCNC: 105 MMOL/L (ref 98–107)
CHOLEST SERPL-MCNC: 149 MG/DL (ref 0–200)
CHOLEST/HDLC SERPL: 3.24 {RATIO}
CO2 SERPL-SCNC: 25.3 MMOL/L (ref 22–29)
CREAT SERPL-MCNC: 1.18 MG/DL (ref 0.76–1.27)
EGFRCR SERPLBLD CKD-EPI 2021: 64.3 ML/MIN/1.73
EOSINOPHIL # BLD AUTO: 0.14 10*3/MM3 (ref 0–0.4)
EOSINOPHIL NFR BLD AUTO: 2.6 % (ref 0.3–6.2)
ERYTHROCYTE [DISTWIDTH] IN BLOOD BY AUTOMATED COUNT: 13.6 % (ref 12.3–15.4)
GLOBULIN SER CALC-MCNC: 2.7 GM/DL
GLUCOSE SERPL-MCNC: 108 MG/DL (ref 65–99)
HBA1C MFR BLD: 6.4 % (ref 4.8–5.6)
HCT VFR BLD AUTO: 39.2 % (ref 37.5–51)
HDLC SERPL-MCNC: 46 MG/DL (ref 40–60)
HGB BLD-MCNC: 12.8 G/DL (ref 13–17.7)
IMM GRANULOCYTES # BLD AUTO: 0.01 10*3/MM3 (ref 0–0.05)
IMM GRANULOCYTES NFR BLD AUTO: 0.2 % (ref 0–0.5)
LDLC SERPL CALC-MCNC: 80 MG/DL (ref 0–100)
LYMPHOCYTES # BLD AUTO: 2.46 10*3/MM3 (ref 0.7–3.1)
LYMPHOCYTES NFR BLD AUTO: 44.8 % (ref 19.6–45.3)
MCH RBC QN AUTO: 30.3 PG (ref 26.6–33)
MCHC RBC AUTO-ENTMCNC: 32.7 G/DL (ref 31.5–35.7)
MCV RBC AUTO: 92.7 FL (ref 79–97)
MONOCYTES # BLD AUTO: 0.37 10*3/MM3 (ref 0.1–0.9)
MONOCYTES NFR BLD AUTO: 6.7 % (ref 5–12)
NEUTROPHILS # BLD AUTO: 2.43 10*3/MM3 (ref 1.7–7)
NEUTROPHILS NFR BLD AUTO: 44.2 % (ref 42.7–76)
NRBC BLD AUTO-RTO: 0 /100 WBC (ref 0–0.2)
PLATELET # BLD AUTO: 178 10*3/MM3 (ref 140–450)
POTASSIUM SERPL-SCNC: 4.4 MMOL/L (ref 3.5–5.2)
PROT SERPL-MCNC: 6.9 G/DL (ref 6–8.5)
PSA SERPL-MCNC: 2.61 NG/ML (ref 0–4)
RBC # BLD AUTO: 4.23 10*6/MM3 (ref 4.14–5.8)
SODIUM SERPL-SCNC: 143 MMOL/L (ref 136–145)
T4 FREE SERPL-MCNC: 1.02 NG/DL (ref 0.93–1.7)
TRIGL SERPL-MCNC: 130 MG/DL (ref 0–150)
TSH SERPL DL<=0.005 MIU/L-ACNC: 10.6 UIU/ML (ref 0.27–4.2)
VLDLC SERPL CALC-MCNC: 23 MG/DL (ref 5–40)
WBC # BLD AUTO: 5.49 10*3/MM3 (ref 3.4–10.8)

## 2024-02-21 ENCOUNTER — OFFICE VISIT (OUTPATIENT)
Dept: FAMILY MEDICINE CLINIC | Facility: CLINIC | Age: 74
End: 2024-02-21
Payer: MEDICARE

## 2024-02-21 VITALS
SYSTOLIC BLOOD PRESSURE: 146 MMHG | HEIGHT: 70 IN | HEART RATE: 85 BPM | WEIGHT: 251.3 LBS | BODY MASS INDEX: 35.98 KG/M2 | DIASTOLIC BLOOD PRESSURE: 90 MMHG | OXYGEN SATURATION: 97 %

## 2024-02-21 DIAGNOSIS — I10 PRIMARY HYPERTENSION: ICD-10-CM

## 2024-02-21 DIAGNOSIS — E11.9 TYPE 2 DIABETES MELLITUS WITHOUT COMPLICATION, WITHOUT LONG-TERM CURRENT USE OF INSULIN: ICD-10-CM

## 2024-02-21 DIAGNOSIS — E66.9 OBESITY (BMI 30.0-34.9): ICD-10-CM

## 2024-02-21 DIAGNOSIS — E78.5 HYPERLIPIDEMIA, UNSPECIFIED HYPERLIPIDEMIA TYPE: Primary | ICD-10-CM

## 2024-02-21 DIAGNOSIS — E07.9 DISEASE OF THYROID GLAND: ICD-10-CM

## 2024-02-21 RX ORDER — LEVOTHYROXINE SODIUM 0.03 MG/1
25 TABLET ORAL DAILY
Qty: 90 TABLET | Refills: 3 | Status: SHIPPED | OUTPATIENT
Start: 2024-02-21 | End: 2025-02-20

## 2024-02-21 NOTE — PROGRESS NOTES
The ABCs of the Annual Wellness Visit  Subsequent Medicare Wellness Visit    Subjective    Marco Antonio Kendrick is a 73 y.o. male who presents for a Subsequent Medicare Wellness Visit.    The following portions of the patient's history were reviewed and   updated as appropriate: allergies, current medications, past family history, past medical history, past social history, past surgical history, and problem list.    Compared to one year ago, the patient feels his physical   health is the same.    Compared to one year ago, the patient feels his mental   health is the same.    Recent Hospitalizations:  He was not admitted to the hospital during the last year.       Current Medical Providers:  Patient Care Team:  Donaldo Garrison MD as PCP - General (Internal Medicine)  Leonel Cage MD as Consulting Physician (Gastroenterology)  Leonel Cage MD as Referring Physician (Gastroenterology)  Kev Latham II, MD as Consulting Physician (Hematology and Oncology)    Outpatient Medications Prior to Visit   Medication Sig Dispense Refill    amLODIPine (NORVASC) 5 MG tablet TAKE 1 TABLET EVERY DAY 90 tablet 10    aspirin 81 MG chewable tablet Chew 1 tablet Daily.      benazepril (LOTENSIN) 10 MG tablet Take 1 tablet by mouth Daily. 90 tablet 3    benzonatate (Tessalon Perles) 100 MG capsule Take 1 capsule by mouth 3 (Three) Times a Day As Needed for Cough. 30 capsule 0    Cholecalciferol (VITAMIN D PO) Take  by mouth.      ibuprofen (ADVIL,MOTRIN) 600 MG tablet Take 1 tablet by mouth Every 6 (Six) Hours As Needed for Moderate Pain . 30 tablet 0    levothyroxine (SYNTHROID, LEVOTHROID) 175 MCG tablet TAKE 1 TABLET EVERY DAY 90 tablet 10    metFORMIN (GLUCOPHAGE) 500 MG tablet Take 2 tablets by mouth 2 (Two) Times a Day With Meals. 360 tablet 3    ondansetron ODT (ZOFRAN-ODT) 4 MG disintegrating tablet Place 1 tablet on the tongue Every 8 (Eight) Hours As Needed for Nausea or Vomiting. 12 tablet 0     "pioglitazone (ACTOS) 30 MG tablet Take 1 tablet by mouth Daily. 90 tablet 3    rosuvastatin (CRESTOR) 10 MG tablet Take 1 tablet by mouth Daily. 90 tablet 3    sertraline (ZOLOFT) 100 MG tablet Take 1.5 tablets by mouth Daily. 135 tablet 3    fluticasone (FLONASE) 50 MCG/ACT nasal spray 2 sprays into the nostril(s) as directed by provider Daily for 14 days. 16 g 0     No facility-administered medications prior to visit.       No opioid medication identified on active medication list. I have reviewed chart for other potential  high risk medication/s and harmful drug interactions in the elderly.        Aspirin is on active medication list. Aspirin use is indicated based on review of current medical condition/s. Pros and cons of this therapy have been discussed today. Benefits of this medication outweigh potential harm.  Patient has been encouraged to continue taking this medication.  .      Patient Active Problem List   Diagnosis    Cancer of sigmoid colon    Sick sinus syndrome    BP (high blood pressure)    History of permanent cardiac pacemaker placement    Disease of thyroid gland    Type 2 diabetes mellitus without complication    Hyperlipidemia    Obesity (BMI 30.0-34.9)    Family history of colon cancer    Screening PSA (prostate specific antigen)     Advance Care Planning   Advance Care Planning     Advance Directive is not on file.  ACP discussion was held with the patient during this visit. Patient does not have an advance directive, information provided.     Objective    Vitals:    02/21/24 0758   BP: 146/90   BP Location: Right arm   Patient Position: Sitting   Cuff Size: Adult   Pulse: 85   SpO2: 97%   Weight: 114 kg (251 lb 4.8 oz)   Height: 177.8 cm (70\")     Estimated body mass index is 36.06 kg/m² as calculated from the following:    Height as of this encounter: 177.8 cm (70\").    Weight as of this encounter: 114 kg (251 lb 4.8 oz).    Class 2 Severe Obesity (BMI >=35 and <=39.9). Obesity-related " health conditions include the following: hypertension, diabetes mellitus, and dyslipidemias. Obesity is unchanged. BMI is is above average; BMI management plan is completed. We discussed portion control and increasing exercise.      Does the patient have evidence of cognitive impairment? No    Lab Results   Component Value Date    CHLPL 149 2024    TRIG 130 2024    HDL 46 2024    LDL 80 2024    VLDL 23 2024    HGBA1C 6.40 (H) 2024        HEALTH RISK ASSESSMENT    Smoking Status:  Social History     Tobacco Use   Smoking Status Never   Smokeless Tobacco Never   Tobacco Comments    caffeine use     Alcohol Consumption:  Social History     Substance and Sexual Activity   Alcohol Use Yes    Comment: occasional     Fall Risk Screen:    SARAH Fall Risk Assessment was completed, and patient is at LOW risk for falls.Assessment completed on:2024    Depression Screenin/21/2024     7:58 AM   PHQ-2/PHQ-9 Depression Screening   Little Interest or Pleasure in Doing Things 0-->not at all   Feeling Down, Depressed or Hopeless 0-->not at all   PHQ-9: Brief Depression Severity Measure Score 0       Health Habits and Functional and Cognitive Screenin/21/2024     7:57 AM   Functional & Cognitive Status   Do you have difficulty preparing food and eating? No   Do you have difficulty bathing yourself, getting dressed or grooming yourself? No   Do you have difficulty using the toilet? No   Do you have difficulty moving around from place to place? No   Do you have trouble with steps or getting out of a bed or a chair? Yes   Current Diet Well Balanced Diet   Dental Exam Up to date   Eye Exam Not up to date   Exercise (times per week) 7 times per week   Do you need help using the phone?  No   Are you deaf or do you have serious difficulty hearing?  Yes   Do you need help to go to places out of walking distance? No   Do you need help shopping? No   Do you need help preparing meals?   No   Do you need help with housework?  No   Do you need help with laundry? No   Do you need help taking your medications? No   Do you need help managing money? No   Do you ever drive or ride in a car without wearing a seat belt? No   Have you felt unusual stress, anger or loneliness in the last month? No   Who do you live with? Spouse   If you need help, do you have trouble finding someone available to you? No   Have you been bothered in the last four weeks by sexual problems? No   Do you have difficulty concentrating, remembering or making decisions? No       Age-appropriate Screening Schedule:  Refer to the list below for future screening recommendations based on patient's age, sex and/or medical conditions. Orders for these recommended tests are listed in the plan section. The patient has been provided with a written plan.    Health Maintenance   Topic Date Due    TDAP/TD VACCINES (1 - Tdap) Never done    ZOSTER VACCINE (1 of 2) Never done    RSV Vaccine - Adults (1 - 1-dose 60+ series) Never done    BMI FOLLOWUP  01/03/2023    INFLUENZA VACCINE  08/01/2023    COVID-19 Vaccine (3 - 2023-24 season) 09/01/2023    DIABETIC EYE EXAM  01/04/2024    URINE MICROALBUMIN  08/09/2024    HEMOGLOBIN A1C  08/14/2024    DIABETIC FOOT EXAM  08/17/2024    LIPID PANEL  02/14/2025    ANNUAL WELLNESS VISIT  02/21/2025    COLONOSCOPY  06/16/2031    HEPATITIS C SCREENING  Completed    Pneumococcal Vaccine 65+  Completed                  CMS Preventative Services Quick Reference  Risk Factors Identified During Encounter  Immunizations Discussed/Encouraged: Tdap, Influenza, Shingrix, COVID19, and RSV (Respiratory Syncytial Virus)  The above risks/problems have been discussed with the patient.  Pertinent information has been shared with the patient in the After Visit Summary.  An After Visit Summary and PPPS were made available to the patient.    Follow Up:   Next Medicare Wellness visit to be scheduled in 1 year.       Additional E&M  "Note during same encounter follows:  Patient has multiple medical problems which are significant and separately identifiable that require additional work above and beyond the Medicare Wellness Visit.      Chief Complaint  Medicare Wellness-subsequent    Subjective          Marco Antonio Kendrick is also being seen today for HLD, DMII, obesity, and hypothyroidism. He is without complaint today. Patient reports medication compliance. He exercises by walking one hour daily. He is not strength training.         Objective   Vital Signs:  /90 (BP Location: Right arm, Patient Position: Sitting, Cuff Size: Adult)   Pulse 85   Ht 177.8 cm (70\")   Wt 114 kg (251 lb 4.8 oz)   SpO2 97%   BMI 36.06 kg/m²     Physical Exam  Vitals reviewed.   Constitutional:       Appearance: Normal appearance. He is obese.   Neurological:      Mental Status: He is alert and oriented to person, place, and time.   Psychiatric:         Mood and Affect: Mood normal.         Behavior: Behavior normal.          The following data was reviewed by: Donaldo Garrison MD on 02/21/2024:  Common labs          8/9/2023    11:09 2/14/2024    10:19   Common Labs   Glucose 108  108    BUN 19  14    Creatinine 1.14  1.18    Sodium 142  143    Potassium 4.9  4.4    Chloride 104  105    Calcium 9.8  9.1    Total Protein 6.7  6.9    Albumin 4.0  4.2    Total Bilirubin 0.3  0.4    Alkaline Phosphatase 53  60    AST (SGOT) 18  14    ALT (SGPT) 14  10    WBC 5.1  5.49    Hemoglobin 12.6  12.8    Hematocrit 39.3  39.2    Platelets 166  178    Total Cholesterol 150  149    Triglycerides 167  130    HDL Cholesterol 42  46    LDL Cholesterol  79  80    Hemoglobin A1C 6.4  6.40    Microalbumin, Urine 5.4     PSA  2.610      Data reviewed : 2/21/24         Assessment and Plan   Diagnoses and all orders for this visit:    1. Hyperlipidemia, unspecified hyperlipidemia type (Primary)    2. Type 2 diabetes mellitus without complication, without long-term current use of " insulin    3. Obesity (BMI 30.0-34.9)    4. Disease of thyroid gland    Encouraged patient to continue daily walks and to start strength training program. His blood pressure is elevated today, but historically at goal. Will check in a few months and adjust regimen at that time, if needed. Diabetes is well controlled on current regimen and he will continue. Hypothyroidism uncontrolled on levothyroxine and he will increase to 200mcg daily, repeat labs and follow-up in 6 weeks.    Discussed immunizations and advanced care planning, as above.        Follow Up   No follow-ups on file.  Patient was given instructions and counseling regarding his condition or for health maintenance advice. Please see specific information pulled into the AVS if appropriate.

## 2024-03-04 RX ORDER — SERTRALINE HYDROCHLORIDE 100 MG/1
150 TABLET, FILM COATED ORAL DAILY
Qty: 135 TABLET | Refills: 3 | Status: SHIPPED | OUTPATIENT
Start: 2024-03-04

## 2024-04-30 ENCOUNTER — OFFICE VISIT (OUTPATIENT)
Dept: FAMILY MEDICINE CLINIC | Facility: CLINIC | Age: 74
End: 2024-04-30
Payer: MEDICARE

## 2024-04-30 VITALS
HEART RATE: 73 BPM | OXYGEN SATURATION: 95 % | TEMPERATURE: 98.3 F | RESPIRATION RATE: 18 BRPM | DIASTOLIC BLOOD PRESSURE: 76 MMHG | SYSTOLIC BLOOD PRESSURE: 134 MMHG | BODY MASS INDEX: 35.95 KG/M2 | HEIGHT: 70 IN | WEIGHT: 251.1 LBS

## 2024-04-30 DIAGNOSIS — E78.5 HYPERLIPIDEMIA, UNSPECIFIED HYPERLIPIDEMIA TYPE: ICD-10-CM

## 2024-04-30 DIAGNOSIS — I10 PRIMARY HYPERTENSION: Primary | ICD-10-CM

## 2024-04-30 DIAGNOSIS — E11.9 TYPE 2 DIABETES MELLITUS WITHOUT COMPLICATION, WITHOUT LONG-TERM CURRENT USE OF INSULIN: ICD-10-CM

## 2024-04-30 DIAGNOSIS — E03.9 HYPOTHYROIDISM, UNSPECIFIED TYPE: ICD-10-CM

## 2024-04-30 PROCEDURE — 3078F DIAST BP <80 MM HG: CPT | Performed by: INTERNAL MEDICINE

## 2024-04-30 PROCEDURE — 3044F HG A1C LEVEL LT 7.0%: CPT | Performed by: INTERNAL MEDICINE

## 2024-04-30 PROCEDURE — 1159F MED LIST DOCD IN RCRD: CPT | Performed by: INTERNAL MEDICINE

## 2024-04-30 PROCEDURE — 3075F SYST BP GE 130 - 139MM HG: CPT | Performed by: INTERNAL MEDICINE

## 2024-04-30 PROCEDURE — 99214 OFFICE O/P EST MOD 30 MIN: CPT | Performed by: INTERNAL MEDICINE

## 2024-04-30 PROCEDURE — 1160F RVW MEDS BY RX/DR IN RCRD: CPT | Performed by: INTERNAL MEDICINE

## 2024-04-30 NOTE — PROGRESS NOTES
Chief Complaint   Patient presents with    Follow-up    Hypertension    Hyperlipidemia    Establish Care     Pt here to establish care, no concerns today    History of Present Illness:  Patient is here today to establish care with a new PCP. He has history of HTN, HLD, Type 2 DM and hypothyroidism. He reports to be doing well and has no medical complaint today.    PMH:   Outpatient Medications Prior to Visit   Medication Sig Dispense Refill    amLODIPine (NORVASC) 5 MG tablet TAKE 1 TABLET EVERY DAY 90 tablet 10    aspirin 81 MG chewable tablet Chew 1 tablet Daily.      benazepril (LOTENSIN) 10 MG tablet Take 1 tablet by mouth Daily. 90 tablet 3    benzonatate (Tessalon Perles) 100 MG capsule Take 1 capsule by mouth 3 (Three) Times a Day As Needed for Cough. 30 capsule 0    Cholecalciferol (VITAMIN D PO) Take  by mouth.      ibuprofen (ADVIL,MOTRIN) 600 MG tablet Take 1 tablet by mouth Every 6 (Six) Hours As Needed for Moderate Pain . 30 tablet 0    levothyroxine (SYNTHROID, LEVOTHROID) 175 MCG tablet TAKE 1 TABLET EVERY DAY 90 tablet 10    levothyroxine (SYNTHROID, LEVOTHROID) 25 MCG tablet Take 1 tablet by mouth Daily. 90 tablet 3    metFORMIN (GLUCOPHAGE) 500 MG tablet Take 2 tablets by mouth 2 (Two) Times a Day With Meals. 360 tablet 3    ondansetron ODT (ZOFRAN-ODT) 4 MG disintegrating tablet Place 1 tablet on the tongue Every 8 (Eight) Hours As Needed for Nausea or Vomiting. 12 tablet 0    pioglitazone (ACTOS) 30 MG tablet Take 1 tablet by mouth Daily. 90 tablet 3    rosuvastatin (CRESTOR) 10 MG tablet Take 1 tablet by mouth Daily. 90 tablet 3    sertraline (ZOLOFT) 100 MG tablet Take 1.5 tablets by mouth Daily. 135 tablet 3    fluticasone (FLONASE) 50 MCG/ACT nasal spray 2 sprays into the nostril(s) as directed by provider Daily for 14 days. 16 g 0     No facility-administered medications prior to visit.      Allergies   Allergen Reactions    Egg-Derived Products Nausea And Vomiting     Past Surgical  "History:   Procedure Laterality Date    APPENDECTOMY      COLON SURGERY  2011    RESECTION    COLONOSCOPY  01/2015    DR. CAGE    COLONOSCOPY N/A 7/25/2016    Procedure: COLONOSCOPY to cecum and ti;  Surgeon: Leonel Cage MD;  Location: [facility] ENDOSCOPY;  Service:     COLONOSCOPY N/A 6/16/2021    Procedure: COLONOSCOPY TO CECUM (SIGMOID RESECTION);  Surgeon: Leonel Cage MD;  Location: [facility] ENDOSCOPY;  Service: Gastroenterology;  Laterality: N/A;  pre:  personal hx of polyps and colon ca  post: DIVERTICULOSIS, HEMORRHOIDS    CYSTOSCOPY      BY DR COBURN    PACEMAKER IMPLANTATION Left      family history includes Cancer in an other family member; Colon cancer in his father; Diabetes in an other family member; Heart disease in an other family member; Hypertension in an other family member; Melanoma in his brother; Stroke in an other family member.   reports that he has never smoked. He has never been exposed to tobacco smoke. He has never used smokeless tobacco. He reports current alcohol use. He reports that he does not use drugs.     /76 (BP Location: Right arm, Patient Position: Sitting, Cuff Size: Adult)   Pulse 73   Temp 98.3 °F (36.8 °C) (Oral)   Resp 18   Ht 177.8 cm (70\")   Wt 114 kg (251 lb 1.6 oz)   SpO2 95%   BMI 36.03 kg/m²   Physical Exam  Constitutional:       Appearance: Normal appearance.   HENT:      Head: Normocephalic and atraumatic.   Cardiovascular:      Rate and Rhythm: Normal rate and regular rhythm.      Heart sounds: Normal heart sounds.   Pulmonary:      Breath sounds: Normal breath sounds.   Abdominal:      General: Bowel sounds are normal.      Palpations: Abdomen is soft.   Neurological:      Mental Status: He is alert and oriented to person, place, and time.   Psychiatric:         Mood and Affect: Mood normal.          The following data was reviewed by: Veronica Cooper MD on 04/30/2024:  Common labs          8/9/2023    11:09 2/14/2024    10:19   Common " Labs   Glucose 108  108    BUN 19  14    Creatinine 1.14  1.18    Sodium 142  143    Potassium 4.9  4.4    Chloride 104  105    Calcium 9.8  9.1    Total Protein 6.7  6.9    Albumin 4.0  4.2    Total Bilirubin 0.3  0.4    Alkaline Phosphatase 53  60    AST (SGOT) 18  14    ALT (SGPT) 14  10    WBC 5.1  5.49    Hemoglobin 12.6  12.8    Hematocrit 39.3  39.2    Platelets 166  178    Total Cholesterol 150  149    Triglycerides 167  130    HDL Cholesterol 42  46    LDL Cholesterol  79  80    Hemoglobin A1C 6.4  6.40    Microalbumin, Urine 5.4     PSA  2.610      TSH          8/9/2023    11:09 2/14/2024    10:19   TSH   TSH 6.460  10.600           Diagnoses and all orders for this visit:    1. Primary hypertension (Primary)  Assessment & Plan:  Hypertension is stable and controlled  Continue current treatment regimen.  Dietary sodium restriction.  Weight loss.  Regular aerobic exercise.  Ambulatory blood pressure monitoring.  Blood pressure will be reassessed in 6 months.      2. Hyperlipidemia, unspecified hyperlipidemia type  Assessment & Plan:   Lipid abnormalities are stable    Plan:  Continue same medication/s without change.      Counseled patient on lifestyle modifications to help control hyperlipidemia.   Cholesterol lowering dietary information shared with patient.  Advised patient to exercise for 150 minutes weekly. (30 minute brisk walk, 5 days a week for example)  Weight Loss encouraged    Patient Treatment Goals:   LDL goal is under 100    Followup in 6 months.      3. Hypothyroidism, unspecified type  Overview:  HYPOTHYROIDISM    Assessment & Plan:  Levothyroxine increase to 200 mcg 2 months ago due worsening TSH level  To repeat TSH today and continue current dose of medication  Will consider medication adjustment pending result      Orders:  -     TSH+Free T4    4. Type 2 diabetes mellitus without complication, without long-term current use of insulin  Assessment & Plan:  Diabetes is stable.   Continue  current treatment regimen.  Recommended an ADA diet.  Regular aerobic exercise.  Reminded to get yearly retinal exam.  Diabetes will be reassessed in 6 months               No follow-ups on file.

## 2024-04-30 NOTE — ASSESSMENT & PLAN NOTE
Diabetes is stable.   Continue current treatment regimen.  Recommended an ADA diet.  Regular aerobic exercise.  Reminded to get yearly retinal exam.  Diabetes will be reassessed in 6 months

## 2024-04-30 NOTE — ASSESSMENT & PLAN NOTE
Lipid abnormalities are stable    Plan:  Continue same medication/s without change.      Counseled patient on lifestyle modifications to help control hyperlipidemia.   Cholesterol lowering dietary information shared with patient.  Advised patient to exercise for 150 minutes weekly. (30 minute brisk walk, 5 days a week for example)  Weight Loss encouraged    Patient Treatment Goals:   LDL goal is under 100    Followup in 6 months.

## 2024-04-30 NOTE — ASSESSMENT & PLAN NOTE
Levothyroxine increase to 200 mcg 2 months ago due worsening TSH level  To repeat TSH today and continue current dose of medication  Will consider medication adjustment pending result

## 2024-05-01 LAB
T4 FREE SERPL-MCNC: 1.81 NG/DL (ref 0.82–1.77)
TSH SERPL DL<=0.005 MIU/L-ACNC: 0.01 UIU/ML (ref 0.45–4.5)

## 2024-06-26 ENCOUNTER — OFFICE VISIT (OUTPATIENT)
Age: 74
End: 2024-06-26
Payer: MEDICARE

## 2024-06-26 ENCOUNTER — CLINICAL SUPPORT NO REQUIREMENTS (OUTPATIENT)
Age: 74
End: 2024-06-26
Payer: MEDICARE

## 2024-06-26 VITALS
WEIGHT: 247 LBS | SYSTOLIC BLOOD PRESSURE: 138 MMHG | BODY MASS INDEX: 35.36 KG/M2 | HEART RATE: 85 BPM | DIASTOLIC BLOOD PRESSURE: 86 MMHG | HEIGHT: 70 IN

## 2024-06-26 DIAGNOSIS — Z95.0 HISTORY OF PERMANENT CARDIAC PACEMAKER PLACEMENT: ICD-10-CM

## 2024-06-26 DIAGNOSIS — I49.5 SICK SINUS SYNDROME: Primary | ICD-10-CM

## 2024-06-26 DIAGNOSIS — I10 PRIMARY HYPERTENSION: ICD-10-CM

## 2024-06-26 DIAGNOSIS — Z95.0 PRESENCE OF CARDIAC PACEMAKER: Primary | ICD-10-CM

## 2024-06-26 PROCEDURE — 1159F MED LIST DOCD IN RCRD: CPT | Performed by: NURSE PRACTITIONER

## 2024-06-26 PROCEDURE — 93000 ELECTROCARDIOGRAM COMPLETE: CPT | Performed by: NURSE PRACTITIONER

## 2024-06-26 PROCEDURE — 93280 PM DEVICE PROGR EVAL DUAL: CPT | Performed by: INTERNAL MEDICINE

## 2024-06-26 PROCEDURE — 3079F DIAST BP 80-89 MM HG: CPT | Performed by: NURSE PRACTITIONER

## 2024-06-26 PROCEDURE — 99213 OFFICE O/P EST LOW 20 MIN: CPT | Performed by: NURSE PRACTITIONER

## 2024-06-26 PROCEDURE — 3075F SYST BP GE 130 - 139MM HG: CPT | Performed by: NURSE PRACTITIONER

## 2024-06-26 PROCEDURE — 1160F RVW MEDS BY RX/DR IN RCRD: CPT | Performed by: NURSE PRACTITIONER

## 2024-06-26 NOTE — PROGRESS NOTES
Date of Office Visit: 2024  Encounter Provider: SADAF Medrano  Place of Service: [facility] CARDIOLOGY  Patient Name: Marco Antonio Kendrick  :1950    Chief Complaint   Patient presents with    SSS    Pacemaker Check   :     HPI: Marco Antonio Kendrick is a 73 y.o. male who previously followed with Dr. Bazan---last seen in ---SSS, s/p PPM and HTN.     Presents for follow up and device check.     Doing well. Denies chest pain, dyspnea, PND, orthopnea or edema.     Tries to walk about a mile everyday--no issues.     Device check within normal limits. A paced  70.2%,  V paced <0.2%. No arrhythmias.      Past Medical History:   Diagnosis Date    Arthritis     Bipolar disorder     Bladder wall thickening     MILD NON-SPECIFIC URINARY BLADDER....FOLLOWED BY DR COBURN    Bone marrow suppression     SIGNIFICANT MYELOSUPRESSION FROM FOLFOX-4 CHEMOTHERAPY    Cancer of sigmoid colon     STAGE III (T3N1M0)    Depression     Disease of thyroid gland     HYPOTHYROIDISM    Diverticula of colon     Enlarged prostate     H/O allergy to eggs     Headache     Hyperlipidemia     Hypertension     Hyperthyroidism     Obesity     Pneumonia 2015    PONV (postoperative nausea and vomiting)     Sick sinus syndrome     S/P LEFT CHEST PACEMAKER IMPLANTATION    Splenomegaly        Past Surgical History:   Procedure Laterality Date    APPENDECTOMY      COLON SURGERY  2011    RESECTION    COLONOSCOPY  2015    DR. CAGE    COLONOSCOPY N/A 2016    Procedure: COLONOSCOPY to cecum and ti;  Surgeon: Leonel Cage MD;  Location: [facility] ENDOSCOPY;  Service:     COLONOSCOPY N/A 2021    Procedure: COLONOSCOPY TO CECUM (SIGMOID RESECTION);  Surgeon: Leonel Cage MD;  Location: [facility] ENDOSCOPY;  Service: Gastroenterology;  Laterality: N/A;  pre:  personal hx of polyps and colon ca  post: DIVERTICULOSIS, HEMORRHOIDS    CYSTOSCOPY      BY DR COBURN    PACEMAKER IMPLANTATION Left         Social History     Socioeconomic History    Marital status:      Spouse name: Carmelita    Number of children: 2   Tobacco Use    Smoking status: Never     Passive exposure: Never    Smokeless tobacco: Never    Tobacco comments:     caffeine use   Vaping Use    Vaping status: Never Used   Substance and Sexual Activity    Alcohol use: Yes     Comment: occasional    Drug use: No    Sexual activity: Defer       Family History   Problem Relation Age of Onset    Colon cancer Father     Melanoma Brother     Heart disease Other     Hypertension Other     Diabetes Other     Cancer Other     Stroke Other        Review of Systems   Constitutional: Negative for chills, fever and malaise/fatigue.   Cardiovascular:  Negative for chest pain, dyspnea on exertion, leg swelling, near-syncope, orthopnea, palpitations, paroxysmal nocturnal dyspnea and syncope.   Respiratory:  Negative for cough and shortness of breath.    Musculoskeletal:  Negative for joint pain, joint swelling and myalgias.   Gastrointestinal:  Negative for abdominal pain, diarrhea, melena, nausea and vomiting.   Genitourinary:  Negative for frequency and hematuria.   Neurological:  Negative for light-headedness, numbness, paresthesias and seizures.   Allergic/Immunologic: Negative.    All other systems reviewed and are negative.      Allergies   Allergen Reactions    Egg-Derived Products Nausea And Vomiting         Current Outpatient Medications:     amLODIPine (NORVASC) 5 MG tablet, TAKE 1 TABLET EVERY DAY, Disp: 90 tablet, Rfl: 10    aspirin 81 MG chewable tablet, Chew 1 tablet Daily., Disp: , Rfl:     benazepril (LOTENSIN) 10 MG tablet, Take 1 tablet by mouth Daily., Disp: 90 tablet, Rfl: 3    Cholecalciferol (VITAMIN D PO), Take  by mouth., Disp: , Rfl:     ibuprofen (ADVIL,MOTRIN) 600 MG tablet, Take 1 tablet by mouth Every 6 (Six) Hours As Needed for Moderate Pain ., Disp: 30 tablet, Rfl: 0    levothyroxine (SYNTHROID, LEVOTHROID) 175 MCG tablet,  "TAKE 1 TABLET EVERY DAY, Disp: 90 tablet, Rfl: 10    metFORMIN (GLUCOPHAGE) 500 MG tablet, Take 2 tablets by mouth 2 (Two) Times a Day With Meals., Disp: 360 tablet, Rfl: 3    ondansetron ODT (ZOFRAN-ODT) 4 MG disintegrating tablet, Place 1 tablet on the tongue Every 8 (Eight) Hours As Needed for Nausea or Vomiting., Disp: 12 tablet, Rfl: 0    pioglitazone (ACTOS) 30 MG tablet, Take 1 tablet by mouth Daily., Disp: 90 tablet, Rfl: 3    rosuvastatin (CRESTOR) 10 MG tablet, Take 1 tablet by mouth Daily., Disp: 90 tablet, Rfl: 3    sertraline (ZOLOFT) 100 MG tablet, Take 1.5 tablets by mouth Daily., Disp: 135 tablet, Rfl: 3    fluticasone (FLONASE) 50 MCG/ACT nasal spray, 2 sprays into the nostril(s) as directed by provider Daily for 14 days., Disp: 16 g, Rfl: 0      Objective:     Vitals:    06/26/24 1053   BP: 138/86   Pulse: 85   Weight: 112 kg (247 lb)   Height: 177.8 cm (70\")     Body mass index is 35.44 kg/m².    PHYSICAL EXAM:    Vitals Reviewed.   General Appearance: No acute distress, well developed and well nourished.   HENT: Atraumatic, normocephalic. External eyes, ears, and nose normal.   Respiratory: No signs of respiratory distress. Respiration rhythm and depth normal.   Clear to auscultation. No rales, crackles, rhonchi, or wheezing auscultated.   Cardiovascular:  Heart Rate and Rhythm: Normal, Heart Sounds: S1 and S2.   Murmurs: No murmurs noted. No rubs, thrills, or gallops.   Lower Extremities: No edema noted.  Musculoskeletal: Normal movement of extremities  Skin: Warm and dry.   Psychiatric: Patient alert and oriented to person, place, and time. Speech and behavior appropriate. Normal mood and affect.       ECG 12 Lead    Date/Time: 6/26/2024 10:57 AM  Performed by: Kalani Osborne APRN    Authorized by: Kalani Osborne APRN  Comparison: compared with previous ECG   Similar to previous ECG  Rhythm: paced  BPM: 85  Conduction: left bundle branch block  Pacing capture: ApVs.          Assessment:       " Diagnosis Plan   1. Sick sinus syndrome        2. History of permanent cardiac pacemaker placement        3. Primary hypertension               Plan:       1-2. SSS, s/p PPM--normal function.     3. HTN, controlled.     Follow up with Dr. Weldon in 1 year with device check.     As always, it has been a pleasure to participate in your patient's care.      Sincerely,         SADAF Murguia

## 2024-09-03 ENCOUNTER — APPOINTMENT (OUTPATIENT)
Dept: CT IMAGING | Facility: HOSPITAL | Age: 74
End: 2024-09-03
Payer: MEDICARE

## 2024-09-03 ENCOUNTER — DOCUMENTATION (OUTPATIENT)
Dept: NEUROSURGERY | Facility: HOSPITAL | Age: 74
End: 2024-09-03
Payer: MEDICARE

## 2024-09-03 ENCOUNTER — HOSPITAL ENCOUNTER (EMERGENCY)
Facility: HOSPITAL | Age: 74
Discharge: HOME | End: 2024-09-03
Attending: EMERGENCY MEDICINE
Payer: MEDICARE

## 2024-09-03 ENCOUNTER — APPOINTMENT (OUTPATIENT)
Dept: GENERAL RADIOLOGY | Facility: HOSPITAL | Age: 74
End: 2024-09-03
Payer: MEDICARE

## 2024-09-03 VITALS
DIASTOLIC BLOOD PRESSURE: 79 MMHG | HEART RATE: 90 BPM | TEMPERATURE: 98 F | BODY MASS INDEX: 35.35 KG/M2 | SYSTOLIC BLOOD PRESSURE: 149 MMHG | OXYGEN SATURATION: 99 % | RESPIRATION RATE: 16 BRPM | HEIGHT: 70 IN | WEIGHT: 246.91 LBS

## 2024-09-03 DIAGNOSIS — S09.90XA CLOSED HEAD INJURY, INITIAL ENCOUNTER: ICD-10-CM
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
## 2024-09-03 DIAGNOSIS — S80.02XA CONTUSION OF LEFT KNEE, INITIAL ENCOUNTER: ICD-10-CM

## 2024-09-03 PROCEDURE — 72125 CT NECK SPINE W/O DYE: CPT

## 2024-09-03 PROCEDURE — 99284 EMERGENCY DEPT VISIT MOD MDM: CPT

## 2024-09-03 PROCEDURE — 70450 CT HEAD/BRAIN W/O DYE: CPT

## 2024-09-03 PROCEDURE — 73560 X-RAY EXAM OF KNEE 1 OR 2: CPT

## 2024-09-03 RX ORDER — MAGNESIUM 200 MG
1 TABLET ORAL AS NEEDED
COMMUNITY
Start: 2024-03-26

## 2024-09-03 NOTE — ED PROVIDER NOTES
MD ATTESTATION NOTE      Brief HPI: 73-year-old male who presents emergency room after his left knee gave out while cutting branches from a tree causing him to fall and strike his left knee and head on the ground.  The patient denies loss of consciousness.  He denies taking blood thinners.    General : 73-year-old patient is awake alert and oriented  HEENT: Abrasion to right forehead: No cervical spine tenderness  CV: Heart is regular with no murmurs  Respiratory: CTA bilaterally  Abd: Soft and nontender  Ext: Abrasion/contusion to the patient's left knee.  Good range of motion of the knee.  Otherwise the patient's extremities are atraumatic with full range of motion without pain to movement or palpation  Skin: No rash  Neuro: Awake with a nonfocal neuro exam  Psych: Normal mood and affect      Plan: We will obtain x-rays of the patient's knee along with CT of the head and C-spine.  My independent interpretation the patient's left knee x-ray is no fracture or dislocation.  CT negative acute.  CT C-spine showed possible fracture through an osteophyte per radiology.  We consulted neurosurgery who reviewed the patient's CT scan and states they feel this is a chronic condition and does not require any further intervention.  Patient C-spine is nontender and he is neurologically intact.  At this point we agree he is stable for discharge and outpatient follow-up        SHARED VISIT: This visit was performed by BOTH a physician and an APC. The substantive portion of the medical decision making was performed by this attesting physician who made or approved the management plan and takes responsibility for patient management. All studies in the APC note (if performed) were independently interpreted by me.         Cj Wilkinson MD  09/03/24 0934

## 2024-09-03 NOTE — ED TRIAGE NOTES
Pt from home via ems, was cutting branches when his knee gave out, fell between brick house and tree, left knee is swollen, abrasion to head, denies loc

## 2024-09-03 NOTE — ED PROVIDER NOTES
EMERGENCY DEPARTMENT ENCOUNTER    Room Number:  42/42  Date of encounter:  9/3/2024  PCP: Veronica Cooper MD  Historian: Patient, spouse, EMS  Chronic or social conditions impacting care (social determinants of health): None    HPI:  Chief Complaint: Fall  A complete HPI/ROS/PMH/PSH/SH/FH are unobtainable due to: Nothing    Context: Marco Antonio Kendrick is a 73 y.o. male with a history of hypertension, diabetes, who presents to the ED c/o acute injuries from a mechanical fall prior to arrival.  Patient was outside cutting branches from a tree when he states that his left knee gave out causing him to fall.  Patient hit his left knee on the concrete as well as hit his head on the brick wall.  Patient denies any loss of consciousness.  Takes no blood thinners.    Review of prior external notes (non-ED):   I reviewed cardiology office visit from 6/26/2024.  Patient being followed for sick sinus disease.    Review of prior external test results outside of this encounter:  I reviewed a CMP from 2/14/2024.  Creatinine 1.18, potassium 4.4.     PAST MEDICAL HISTORY  Active Ambulatory Problems     Diagnosis Date Noted    Cancer of sigmoid colon 04/19/2016    Sick sinus syndrome 03/09/2017    Primary hypertension 03/09/2017    History of permanent cardiac pacemaker placement 01/24/2018    Hypothyroidism 01/24/2018    Type 2 diabetes mellitus without complication 03/01/2018    Hyperlipidemia 06/08/2018    Obesity (BMI 30.0-34.9) 10/24/2018    Family history of colon cancer 05/18/2021    Screening PSA (prostate specific antigen) 07/07/2022     Resolved Ambulatory Problems     Diagnosis Date Noted    Long-term (current) use of anticoagulants 04/07/2016     Past Medical History:   Diagnosis Date    Arthritis     Bipolar disorder     Bladder wall thickening     Bone marrow suppression     Depression     Disease of thyroid gland     Diverticula of colon     Enlarged prostate     H/O allergy to eggs     Headache     Hypertension      Hyperthyroidism     Obesity     Pneumonia 11/2015    PONV (postoperative nausea and vomiting)     Splenomegaly          PAST SURGICAL HISTORY  Past Surgical History:   Procedure Laterality Date    APPENDECTOMY      COLON SURGERY  2011    RESECTION    COLONOSCOPY  01/2015    DR. CAGE    COLONOSCOPY N/A 7/25/2016    Procedure: COLONOSCOPY to cecum and ti;  Surgeon: Leonel Cage MD;  Location: [facility] ENDOSCOPY;  Service:     COLONOSCOPY N/A 6/16/2021    Procedure: COLONOSCOPY TO CECUM (SIGMOID RESECTION);  Surgeon: Leonel Cage MD;  Location: [facility] ENDOSCOPY;  Service: Gastroenterology;  Laterality: N/A;  pre:  personal hx of polyps and colon ca  post: DIVERTICULOSIS, HEMORRHOIDS    CYSTOSCOPY      BY DR COBURN    PACEMAKER IMPLANTATION Left          FAMILY HISTORY  Family History   Problem Relation Age of Onset    Colon cancer Father     Melanoma Brother     Heart disease Other     Hypertension Other     Diabetes Other     Cancer Other     Stroke Other          SOCIAL HISTORY  Social History     Socioeconomic History    Marital status:      Spouse name: Carmelita    Number of children: 2   Tobacco Use    Smoking status: Never     Passive exposure: Never    Smokeless tobacco: Never    Tobacco comments:     caffeine use   Vaping Use    Vaping status: Never Used   Substance and Sexual Activity    Alcohol use: Yes     Comment: occasional    Drug use: No    Sexual activity: Defer         ALLERGIES  Egg-derived products        REVIEW OF SYSTEMS  All systems reviewed and negative except for those discussed in HPI.       PHYSICAL EXAM    I have reviewed the triage vital signs and nursing notes.    ED Triage Vitals [09/03/24 1715]   Temp Heart Rate Resp BP SpO2   98 °F (36.7 °C) 90 16 156/88 97 %      Temp src Heart Rate Source Patient Position BP Location FiO2 (%)   -- -- -- -- --       Physical Exam  GENERAL: Alert, oriented, not distressed  HENT: Abrasion to the right forehead.  No dental or nasal  injury.  No cervical tenderness or vertebral step-off  EYES: no scleral icterus, EOMI  CV: regular rhythm, regular rate, no murmur  RESPIRATORY: normal effort, CTA  ABDOMEN: soft, nontender  MUSCULOSKELETAL: Small abrasion to the left knee without bony deformity.  Mildly decreased range of motion.  NEURO: alert, moves all extremities, follows commands  SKIN: warm, dry    RADIOLOGY  CT Head Without Contrast, CT Cervical Spine Without Contrast    Result Date: 9/3/2024  CT HEAD AND CERVICAL SPINE WITHOUT CONTRAST  CLINICAL HISTORY: fall head injury. Hit forehead on brick wall.  TECHNIQUE: CT scan of the head was obtained with 3 mm axial soft tissue and 2 mm bone algorithm images. No intravenous contrast was administered. Sagittal and coronal reconstructions were obtained.  COMPARISON: None.  FINDINGS:   There is no evidence for an acute extra-axial hemorrhage or a calvarial fracture.  Mild to moderate changes of chronic small vessel ischemic phenomena are noted. There is a lacunar disease within the thalami. Foci of encephalomalacia are noted within the anterior and inferior aspects of both frontal lobes that are likely representative of chronic traumatic contusions. As a whole, the largest contiguous area of encephalomalacia measures up to 2.6 x 2.4 cm in greatest axial dimensions. Foci of encephalomalacia are noted within the posterior aspect of the right cerebellar hemisphere with the largest focus measuring up to 1.5 x 1.2 cm in greatest axial dimensions and these are compatible with chronic infarcts that are likely within the right PICA distribution. The ventricles, sulci, and cisterns are age-appropriate.  There is a small right frontal convexity scalp hematoma.       No evidence for acute traumatic intracranial pathology.  Mild to moderate changes of chronic small vessel ischemic phenomena, old lacunar disease, and chronic infarcts within the right cerebellar hemisphere are incidentally noted. Additionally, foci  of encephalomalacia are noted within the anterior and inferior portions of the frontal lobes likely representative of chronic traumatic contusions.   TECHNIQUE: CT scan of the cervical spine was obtained with 1 mm axial bone algorithm and 2 mm axial soft tissue algorithm images. Sagittal and coronal reconstructed images were obtained.  FINDINGS:  There is a lucency within an anteriorly exophytic osteophyte arising from the inferior endplate of C4 which is age-indeterminate but I suspect that it is chronic in nature as it appears to have well-corticated margins. Overall, there is no convincing evidence for acute fracture or bony malalignment involving the cervical spine. Prominent changes of DISH are noted within the mid and lower cervical spine. Disc bulges are seen at C4-5, C5-6, and C6-7 resulting in relatively mild degrees of canal narrowing. Foraminal stenotic changes are most prominently seen within the left C3-4 neural foramen where there is moderate to severe foraminal stenosis secondary to combination of uncovertebral joint hypertrophy and facet arthrosis.  IMPRESSION:  There is no convincing evidence to suggest acute fracture or bony malalignment involving the cervical spine. However, there is an age-indeterminate bony defect within an osteophyte arising from the inferior endplate of C4. I suspect that this is chronic in nature although I cannot entirely exclude the possibility of an acute fracture at this site. This could potentially be further characterized with MR imaging although MR imaging could prove to be indeterminate as well.  Incidental degenerative phenomena within the cervical spine are as discussed in detail above.  These findings and recommendations were discussed with Alex Boston on 9/3/2024 at approximately 6:48 p.m.   Radiation dose reduction techniques were utilized, including automated exposure control and exposure modulation based on body size.  This report was finalized on 9/3/2024 6:51  PM by Dr. Gregg Giang M.D on Workstation: [id number]      XR Knee 1 or 2 View Left    Result Date: 9/3/2024  XR KNEE 1 OR 2 VW LEFT-  INDICATIONS: Trauma.  TECHNIQUE: 2 VIEWS OF THE LEFT KNEE  COMPARISON: None available  FINDINGS:  No acute fracture, erosion, or dislocation is identified. Minimal knee effusion. Follow-up/further evaluation can be obtained as indications persist.       As described.    This report was finalized on 9/3/2024 5:36 PM by Dr. Bryson Ambriz M.D on Workstation: [id number]       I ordered the above noted radiological studies. Reviewed by me and discussed with radiologist.  See dictation for official radiology interpretation.      MEDICATIONS GIVEN IN ER    Medications - No data to display      ADDITIONAL ORDERS CONSIDERED BUT NOT ORDERED:  Admission was considered but after careful review of the patient's presentation, physical examination, diagnostic results, and response to treatment the patient may be safely discharged with outpatient follow-up.       PROGRESS, DATA ANALYSIS, CONSULTS, AND MEDICAL DECISION MAKING    All labs have been independently interpreted by myself.  All radiology studies have been independently interpreted by myself and discussed with radiologist dictating the report.   EKG's independently interpreted by myself.  Discussion below represents my analysis of pertinent findings related to patient's condition, differential diagnosis, treatment plan and final disposition.    I have discussed case with Dr. Wilkinson, emergency room physician.  He has performed his own bedside examination and agrees with treatment plan.    ED Course as of 09/03/24 1956   Tue Sep 03, 2024   1724 Patient presents from home with left knee and head injury after mechanical fall prior to arrival.  Neurologically intact.    No alarm signs or symptoms but given patient age, high risk of morbidity and mortality, obtaining CT head imaging.  Patient denies any neck pain but given patient age, high  risk of occult cervical spine fracture, obtaining CT cervical imaging.   [EE]   1842 Left knee images independently interpreted myself show no evidence of acute fracture. [EE]   1850 I discussed CT imaging of the head and cervical spine with Dr. Giang.  Patient does have a questionable osteophyte fracture in the cervical spine.  Plan to discuss with neurosurgery.  The patient himself denies any neck pain whatsoever.      [EE]   1949 Discussed case with Dr. Willams, neurosurgery.  She has reviewed CT imaging.  She suspects this is likely a chronic condition and does not require any further intervention at this time. [EE]   1952 Updated patient on workup.  He states he is feeling much better.  Again he denies any neck pain.  He is able to ambulate around the room.  We will discharge.  We will refer to Ortho for his knee as needed. [EE]      ED Course User Index  [EE] Alex Boston PA       AS OF 19:56 EDT VITALS:    BP - 149/79  HR - 90  TEMP - 98 °F (36.7 °C)  O2 SATS - 99%        DIAGNOSIS  Final diagnoses:   Fall, initial encounter   Closed head injury, initial encounter   Contusion of left knee, initial encounter         DISPOSITION  Discharged    Admission was considered but after careful review of the patient's presentation, physical examination, diagnostic results, and response to treatment the patient may be safely discharged with outpatient follow-up.         Dictated utilizing Dragon dictation     Alex Boston PA  09/03/24 1956

## 2024-09-04 ENCOUNTER — PATIENT OUTREACH (OUTPATIENT)
Dept: CASE MANAGEMENT | Facility: OTHER | Age: 74
End: 2024-09-04
Payer: MEDICARE

## 2024-09-04 NOTE — OUTREACH NOTE
AMBULATORY CASE MANAGEMENT NOTE    Names and Relationships of Patient/Support Persons: Contact: Carmelita Kendrick; Relationship: Emergency Contact -   Patient Outreach  Rn-ACM outreach with patient's spouse. Discussed 9/3/24 ED visit regarding closed head injury; contusion of left knee with fall. Patient treated and discharged. Spouse states patient compliant with ED recommendations and voiced intent to follow up with PCP for ED follow and recommendations. Spouse states patient applying ice to left knee and elevating. Spouse discusses options of [facility] to improved patient upper body strength and voiced intent to discuss with PCP. Spouse states patient has had no difficulty with headache; nausea; vomiting; dizziness chest pain; SOB; appetite or sleeping. . Spouse states patient is compliant with medications and medical appointments. Reviewed with patient ED AVS recommendations; education; role of RN-ACM and HRCM case management services. Spouse verbalized understanding. Spouse states to appreciate outreach and declines needs for further outreach at this time. No further questions voiced at this time.       Adult Patient Profile  Questions/Answers      Flowsheet Row Most Recent Value   Symptoms/Conditions Managed at Home musculoskeletal, cardiovascular, diabetes, type 2   Cardiovascular Symptoms/Conditions hypertension   Cardiovascular Management Strategies medication therapy, other (see comments)  [Physician follow up]   Diabetes Management Strategies medication therapy, other (see comments)  [Physician follow up]   Musculoskeletal Symptoms/Conditions unsteady gait, other (see comments)  [Closed Head injury with fall,  contusion left knee]   Musculoskeletal Management Strategies other (see comments)  [Physician follow up]   Barriers to Taking Medication as Prescribed none   Primary Source of Support/Comfort spouse   People in Home spouse        Social Work Assessment  Questions/Answers      Flowsheet Row  Most Recent Value   People in Home spouse   Functional Status Comments Patient states to be independent with ADL's,  light meal preparation,  transportation and ambulating without assistive device        Send Education  Questions/Answers      Flowsheet Row Most Recent Value   Annual Wellness Visit:  Patient Has Completed   Other Patient Education/Resources  24/7 [facility] Nurse Call Line        [facility] updated and reviewed with the patient during this program:  --     Food Insecurity: No Food Insecurity (9/4/2024)    Hunger Vital Sign     Worried About Running Out of Food in the Last Year: Never true     Ran Out of Food in the Last Year: Never true      --     Transportation Needs: No Transportation Needs (9/4/2024)    PRAPARE - Transportation     Lack of Transportation (Medical): No     Lack of Transportation (Non-Medical): No       Education Documentation  Unresolved/Worsening Symptoms, taught by Nazia Kirkpatrick RN at 9/4/2024  1:27 PM.  Learner: Family  Readiness: Acceptance  Method: Explanation  Response: Verbalizes Understanding    Provider Follow-Up, taught by Nazia Kirkpatrick RN at 9/4/2024  1:27 PM.  Learner: Family  Readiness: Acceptance  Method: Explanation  Response: Verbalizes Understanding    Signs/Symptoms, taught by Nazia Kirkpatrick RN at 9/4/2024  1:27 PM.  Learner: Family  Readiness: Acceptance  Method: Explanation  Response: Verbalizes Understanding    Unresolved/Worsening Symptoms, taught by Nazia Kirkpatrick RN at 9/4/2024  1:27 PM.  Learner: Family  Readiness: Acceptance  Method: Explanation  Response: Verbalizes Understanding    Home Safety, taught by Nazia Kirkpatrick RN at 9/4/2024  1:27 PM.  Learner: Family  Readiness: Acceptance  Method: Explanation  Response: Verbalizes Understanding    Energy Conservation, taught by Nazia Kirkpatrick RN at 9/4/2024  1:27 PM.  Learner: Family  Readiness: Acceptance  Method: Explanation  Response: Verbalizes Understanding          Nazia  T  Ambulatory Case Management    9/4/2024, 13:28 EDT

## 2024-09-04 NOTE — CONSULTS
Inpatient Neurosurgery Consult  Consult performed by: Chikis Willams MD  Consult ordered by: Alex Boston PA  Reason for consult: C4 anterior osteophyte fracture  Assessment/Recommendations: Reviewed imaging, agree with radiology assessment that this is a corticated anterior osteophyte fracture, likely chronic. Per RALF Durham, the patient has no neck pain whatsoever and he has very low clinical suspicion for acute cervical injury and is neurologically intact.     No need for MRI in this clinical picture, f/u with Neurosurgery PRN.     CT Cervical Spine Without Contrast    Result Date: 9/3/2024  CT HEAD AND CERVICAL SPINE WITHOUT CONTRAST  CLINICAL HISTORY: fall head injury. Hit forehead on brick wall.  TECHNIQUE: CT scan of the head was obtained with 3 mm axial soft tissue and 2 mm bone algorithm images. No intravenous contrast was administered. Sagittal and coronal reconstructions were obtained.  COMPARISON: None.  FINDINGS:   There is no evidence for an acute extra-axial hemorrhage or a calvarial fracture.  Mild to moderate changes of chronic small vessel ischemic phenomena are noted. There is a lacunar disease within the thalami. Foci of encephalomalacia are noted within the anterior and inferior aspects of both frontal lobes that are likely representative of chronic traumatic contusions. As a whole, the largest contiguous area of encephalomalacia measures up to 2.6 x 2.4 cm in greatest axial dimensions. Foci of encephalomalacia are noted within the posterior aspect of the right cerebellar hemisphere with the largest focus measuring up to 1.5 x 1.2 cm in greatest axial dimensions and these are compatible with chronic infarcts that are likely within the right PICA distribution. The ventricles, sulci, and cisterns are age-appropriate.  There is a small right frontal convexity scalp hematoma.      Impression:  No evidence for acute traumatic intracranial pathology.  Mild to moderate changes of chronic small  vessel ischemic phenomena, old lacunar disease, and chronic infarcts within the right cerebellar hemisphere are incidentally noted. Additionally, foci of encephalomalacia are noted within the anterior and inferior portions of the frontal lobes likely representative of chronic traumatic contusions.   TECHNIQUE: CT scan of the cervical spine was obtained with 1 mm axial bone algorithm and 2 mm axial soft tissue algorithm images. Sagittal and coronal reconstructed images were obtained.  FINDINGS:  There is a lucency within an anteriorly exophytic osteophyte arising from the inferior endplate of C4 which is age-indeterminate but I suspect that it is chronic in nature as it appears to have well-corticated margins. Overall, there is no convincing evidence for acute fracture or bony malalignment involving the cervical spine. Prominent changes of DISH are noted within the mid and lower cervical spine. Disc bulges are seen at C4-5, C5-6, and C6-7 resulting in relatively mild degrees of canal narrowing. Foraminal stenotic changes are most prominently seen within the left C3-4 neural foramen where there is moderate to severe foraminal stenosis secondary to combination of uncovertebral joint hypertrophy and facet arthrosis.  IMPRESSION:  There is no convincing evidence to suggest acute fracture or bony malalignment involving the cervical spine. However, there is an age-indeterminate bony defect within an osteophyte arising from the inferior endplate of C4. I suspect that this is chronic in nature although I cannot entirely exclude the possibility of an acute fracture at this site. This could potentially be further characterized with MR imaging although MR imaging could prove to be indeterminate as well.  Incidental degenerative phenomena within the cervical spine are as discussed in detail above.  These findings and recommendations were discussed with Alex Boston on 9/3/2024 at approximately 6:48 p.m.   Radiation dose reduction  techniques were utilized, including automated exposure control and exposure modulation based on body size.  This report was finalized on 9/3/2024 6:51 PM by Dr. Gregg Giang M.D on Workstation: [id number]

## 2024-09-05 ENCOUNTER — TELEPHONE (OUTPATIENT)
Dept: FAMILY MEDICINE CLINIC | Facility: CLINIC | Age: 74
End: 2024-09-05
Payer: MEDICARE

## 2024-09-05 NOTE — TELEPHONE ENCOUNTER
PT'S WIFE EXPRESSED THAT PT IS WANTING TO SWITCH TO ANOTHER PROVIDER AND IS WANTING TO LOOK INTO SEEING DR. KUMAR. PT WILL PROBABLY NEED VISIT SOON OR AT LEAST A REFERRAL PUT IN BECAUSE APPARENTLY PT FELL AND THEY ARE WANTING HIM TO DO PHYSICAL THERAPY.    JANE

## 2024-09-09 NOTE — TELEPHONE ENCOUNTER
HUB TO RELAY, [facility] FOR WIFE    UNFORTUNATELY DR KUMAR WILL NOT BE TAKING ON HANH AS PATIENT. PER POLICY, HE IS ALREADY ESTABLISHED AND CANNOT SWITCH FURTHER BETWEEN PROVIDERS HERE.    THANK YOU

## 2024-09-16 ENCOUNTER — OFFICE VISIT (OUTPATIENT)
Dept: FAMILY MEDICINE CLINIC | Facility: CLINIC | Age: 74
End: 2024-09-16
Payer: MEDICARE

## 2024-09-16 VITALS
OXYGEN SATURATION: 96 % | TEMPERATURE: 96.6 F | DIASTOLIC BLOOD PRESSURE: 84 MMHG | RESPIRATION RATE: 16 BRPM | HEIGHT: 70 IN | WEIGHT: 249.1 LBS | HEART RATE: 89 BPM | SYSTOLIC BLOOD PRESSURE: 132 MMHG | BODY MASS INDEX: 35.66 KG/M2

## 2024-09-16 DIAGNOSIS — I10 PRIMARY HYPERTENSION: ICD-10-CM

## 2024-09-16 DIAGNOSIS — E03.9 HYPOTHYROIDISM, UNSPECIFIED TYPE: Primary | ICD-10-CM

## 2024-09-16 DIAGNOSIS — E11.9 TYPE 2 DIABETES MELLITUS WITHOUT COMPLICATION, WITHOUT LONG-TERM CURRENT USE OF INSULIN: ICD-10-CM

## 2024-09-16 DIAGNOSIS — E78.5 HYPERLIPIDEMIA, UNSPECIFIED HYPERLIPIDEMIA TYPE: ICD-10-CM

## 2024-09-16 PROCEDURE — 1160F RVW MEDS BY RX/DR IN RCRD: CPT | Performed by: STUDENT IN AN ORGANIZED HEALTH CARE EDUCATION/TRAINING PROGRAM

## 2024-09-16 PROCEDURE — 99214 OFFICE O/P EST MOD 30 MIN: CPT | Performed by: STUDENT IN AN ORGANIZED HEALTH CARE EDUCATION/TRAINING PROGRAM

## 2024-09-16 PROCEDURE — 1159F MED LIST DOCD IN RCRD: CPT | Performed by: STUDENT IN AN ORGANIZED HEALTH CARE EDUCATION/TRAINING PROGRAM

## 2024-09-16 PROCEDURE — 3075F SYST BP GE 130 - 139MM HG: CPT | Performed by: STUDENT IN AN ORGANIZED HEALTH CARE EDUCATION/TRAINING PROGRAM

## 2024-09-16 PROCEDURE — 1126F AMNT PAIN NOTED NONE PRSNT: CPT | Performed by: STUDENT IN AN ORGANIZED HEALTH CARE EDUCATION/TRAINING PROGRAM

## 2024-09-16 PROCEDURE — 3079F DIAST BP 80-89 MM HG: CPT | Performed by: STUDENT IN AN ORGANIZED HEALTH CARE EDUCATION/TRAINING PROGRAM

## 2024-09-16 PROCEDURE — 3044F HG A1C LEVEL LT 7.0%: CPT | Performed by: STUDENT IN AN ORGANIZED HEALTH CARE EDUCATION/TRAINING PROGRAM

## 2024-09-16 RX ORDER — ROSUVASTATIN CALCIUM 20 MG/1
20 TABLET, COATED ORAL DAILY
Qty: 90 TABLET | Refills: 3 | Status: SHIPPED | OUTPATIENT
Start: 2024-09-16

## 2024-09-17 LAB
HBA1C MFR BLD: 6.4 % (ref 4.8–5.6)
T4 SERPL-MCNC: 6.6 UG/DL (ref 4.5–12)
TSH SERPL DL<=0.005 MIU/L-ACNC: 6.42 UIU/ML (ref 0.45–4.5)

## 2024-09-17 RX ORDER — LEVOTHYROXINE SODIUM 200 UG/1
200 TABLET ORAL DAILY
Qty: 30 TABLET | Refills: 3 | Status: SHIPPED | OUTPATIENT
Start: 2024-09-17

## 2024-09-18 ENCOUNTER — TELEPHONE (OUTPATIENT)
Dept: FAMILY MEDICINE CLINIC | Facility: CLINIC | Age: 74
End: 2024-09-18

## 2024-10-23 ENCOUNTER — LAB (OUTPATIENT)
Dept: FAMILY MEDICINE CLINIC | Facility: CLINIC | Age: 74
End: 2024-10-23
Payer: MEDICARE

## 2024-10-23 DIAGNOSIS — E03.9 HYPOTHYROIDISM, UNSPECIFIED TYPE: ICD-10-CM

## 2024-10-23 DIAGNOSIS — E11.9 TYPE 2 DIABETES MELLITUS WITHOUT COMPLICATION, WITHOUT LONG-TERM CURRENT USE OF INSULIN: Primary | ICD-10-CM

## 2024-10-23 DIAGNOSIS — E78.5 HYPERLIPIDEMIA, UNSPECIFIED HYPERLIPIDEMIA TYPE: ICD-10-CM

## 2024-10-24 LAB
HBA1C MFR BLD: 6 % (ref 4.8–5.6)
T4 FREE SERPL-MCNC: 1.08 NG/DL (ref 0.92–1.68)
TSH SERPL DL<=0.005 MIU/L-ACNC: 4.54 UIU/ML (ref 0.27–4.2)

## 2024-11-01 RX ORDER — BENAZEPRIL HYDROCHLORIDE 10 MG/1
10 TABLET ORAL DAILY
Qty: 90 TABLET | Refills: 0 | Status: SHIPPED | OUTPATIENT
Start: 2024-11-01

## 2024-11-01 NOTE — TELEPHONE ENCOUNTER
Caller: [facility] - [place], [place] - [address] - [phone] [facility] [phone] FX    Relationship: Pharmacy    Best call back number: [phone]    Requested Prescriptions:   Requested Prescriptions     Pending Prescriptions Disp Refills    benazepril (LOTENSIN) 10 MG tablet 90 tablet 3     Sig: Take 1 tablet by mouth Daily.        Pharmacy where request should be sent: [facility] - [place], [place] - [address] - [phone] [facility] [phone] FX     Last office visit with prescribing clinician: 4/30/2024   Last telemedicine visit with prescribing clinician: Visit date not found   Next office visit with prescribing clinician: 11/8/2024     Additional details provided by patient:     Does the patient have less than a 3 day supply:  [] Yes  [x] No    Would you like a call back once the refill request has been completed: [] Yes [x] No    If the office needs to give you a call back, can they leave a voicemail: [] Yes [x] No    Elsa Francisco Rep   11/01/24 15:49 EDT

## 2024-11-08 ENCOUNTER — OFFICE VISIT (OUTPATIENT)
Dept: FAMILY MEDICINE CLINIC | Facility: CLINIC | Age: 74
End: 2024-11-08
Payer: MEDICARE

## 2024-11-08 VITALS
TEMPERATURE: 98.1 F | DIASTOLIC BLOOD PRESSURE: 80 MMHG | HEART RATE: 82 BPM | OXYGEN SATURATION: 97 % | HEIGHT: 70 IN | BODY MASS INDEX: 36.28 KG/M2 | SYSTOLIC BLOOD PRESSURE: 130 MMHG | WEIGHT: 253.4 LBS

## 2024-11-08 DIAGNOSIS — E66.01 CLASS 2 SEVERE OBESITY DUE TO EXCESS CALORIES WITH SERIOUS COMORBIDITY AND BODY MASS INDEX (BMI) OF 36.0 TO 36.9 IN ADULT: ICD-10-CM

## 2024-11-08 DIAGNOSIS — I10 PRIMARY HYPERTENSION: Primary | ICD-10-CM

## 2024-11-08 DIAGNOSIS — E78.5 HYPERLIPIDEMIA, UNSPECIFIED HYPERLIPIDEMIA TYPE: ICD-10-CM

## 2024-11-08 DIAGNOSIS — E66.812 CLASS 2 SEVERE OBESITY DUE TO EXCESS CALORIES WITH SERIOUS COMORBIDITY AND BODY MASS INDEX (BMI) OF 36.0 TO 36.9 IN ADULT: ICD-10-CM

## 2024-11-08 DIAGNOSIS — E03.9 HYPOTHYROIDISM, UNSPECIFIED TYPE: ICD-10-CM

## 2024-11-08 DIAGNOSIS — E11.9 TYPE 2 DIABETES MELLITUS WITHOUT COMPLICATION, WITHOUT LONG-TERM CURRENT USE OF INSULIN: ICD-10-CM

## 2024-11-08 RX ORDER — SEMAGLUTIDE 0.68 MG/ML
0.25 INJECTION, SOLUTION SUBCUTANEOUS WEEKLY
Qty: 3 ML | Refills: 0 | Status: SHIPPED | OUTPATIENT
Start: 2024-11-08

## 2024-11-08 NOTE — PROGRESS NOTES
Chief Complaint   Patient presents with    Follow-up      History of Present Illness:  Patient is here today for routine follow up of HTN, HLD, type 2 DM & Hypothyroidism. He reports to be doing well overall. No new medical concerns today but is concerned about weight loss. He has tries healthy diet, can't really exercise much due to joint problems. Has not achieved any significant amount of weight. He would like to try medication to help with weight loss.    PMH:   Outpatient Medications Prior to Visit   Medication Sig Dispense Refill    amLODIPine (NORVASC) 5 MG tablet TAKE 1 TABLET EVERY DAY 90 tablet 10    aspirin 81 MG chewable tablet Chew 1 tablet Daily.      benazepril (LOTENSIN) 10 MG tablet Take 1 tablet by mouth Daily. 90 tablet 0    Cholecalciferol (VITAMIN D PO) Take  by mouth.      ibuprofen (ADVIL,MOTRIN) 600 MG tablet Take 1 tablet by mouth Every 6 (Six) Hours As Needed for Moderate Pain . 30 tablet 0    levothyroxine (SYNTHROID, LEVOTHROID) 200 MCG tablet Take 1 tablet by mouth Daily. 30 tablet 3    Magnesium 200 MG tablet Take 1 tablet by mouth As Needed.      metFORMIN (GLUCOPHAGE) 500 MG tablet Take 2 tablets by mouth 2 (Two) Times a Day With Meals. 360 tablet 3    pioglitazone (ACTOS) 30 MG tablet Take 1 tablet by mouth Daily. 90 tablet 3    rosuvastatin (Crestor) 20 MG tablet Take 1 tablet by mouth Daily. 90 tablet 3    sertraline (ZOLOFT) 100 MG tablet Take 1.5 tablets by mouth Daily. 135 tablet 3    ondansetron ODT (ZOFRAN-ODT) 4 MG disintegrating tablet Place 1 tablet on the tongue Every 8 (Eight) Hours As Needed for Nausea or Vomiting. 12 tablet 0    fluticasone (FLONASE) 50 MCG/ACT nasal spray 2 sprays into the nostril(s) as directed by provider Daily for 14 days. 16 g 0     No facility-administered medications prior to visit.      Allergies   Allergen Reactions    Egg-Derived Products Nausea And Vomiting     Past Surgical History:   Procedure Laterality Date    APPENDECTOMY      COLON  "SURGERY  2011    RESECTION    COLONOSCOPY  01/2015    DR. CAGE    COLONOSCOPY N/A 7/25/2016    Procedure: COLONOSCOPY to cecum and ti;  Surgeon: Leonel Cage MD;  Location: [facility] ENDOSCOPY;  Service:     COLONOSCOPY N/A 6/16/2021    Procedure: COLONOSCOPY TO CECUM (SIGMOID RESECTION);  Surgeon: Leonel Cage MD;  Location: [facility] ENDOSCOPY;  Service: Gastroenterology;  Laterality: N/A;  pre:  personal hx of polyps and colon ca  post: DIVERTICULOSIS, HEMORRHOIDS    CYSTOSCOPY      BY DR COBURN    PACEMAKER IMPLANTATION Left      family history includes Cancer in an other family member; Colon cancer in his father; Diabetes in an other family member; Heart disease in an other family member; Hypertension in an other family member; Melanoma in his brother; Stroke in an other family member.   reports that he has never smoked. He has never been exposed to tobacco smoke. He has never used smokeless tobacco. He reports current alcohol use. He reports that he does not use drugs.     /80 (BP Location: Right arm, Patient Position: Sitting, Cuff Size: Large Adult)   Pulse 82   Temp 98.1 °F (36.7 °C) (Oral)   Ht 177.8 cm (70\")   Wt 115 kg (253 lb 6.4 oz)   SpO2 97%   BMI 36.36 kg/m²   Physical Exam  Constitutional:       Appearance: Normal appearance.   HENT:      Head: Normocephalic and atraumatic.      Mouth/Throat:      Mouth: Mucous membranes are moist.   Eyes:      Conjunctiva/sclera: Conjunctivae normal.   Cardiovascular:      Rate and Rhythm: Normal rate and regular rhythm.      Heart sounds: Normal heart sounds.   Pulmonary:      Breath sounds: Normal breath sounds.   Abdominal:      General: Bowel sounds are normal.      Palpations: Abdomen is soft.   Neurological:      Mental Status: He is alert and oriented to person, place, and time.          The following data was reviewed by: Veronica Cooper MD on 11/08/2024:  Common labs          2/14/2024    10:19 9/16/2024    15:20 10/23/2024    " 10:34   Common Labs   Glucose 108      BUN 14      Creatinine 1.18      Sodium 143      Potassium 4.4      Chloride 105      Calcium 9.1      Total Protein 6.9      Albumin 4.2      Total Bilirubin 0.4      Alkaline Phosphatase 60      AST (SGOT) 14      ALT (SGPT) 10      WBC 5.49      Hemoglobin 12.8      Hematocrit 39.2      Platelets 178      Total Cholesterol 149      Triglycerides 130      HDL Cholesterol 46      LDL Cholesterol  80      Hemoglobin A1C 6.40  6.4  6.00    PSA 2.610        TSH          4/30/2024    13:48 9/16/2024    15:20 10/23/2024    10:34   TSH   TSH 0.015  6.420  4.540           Diagnoses and all orders for this visit:    1. Primary hypertension (Primary)  Assessment & Plan:  Hypertension is stable and controlled  Continue current treatment regimen.  Dietary sodium restriction.  Weight loss.  Regular aerobic exercise.  Ambulatory blood pressure monitoring.  Blood pressure will be reassessed in 6 months.    Orders:  -     Basic Metabolic Panel; Future    2. Type 2 diabetes mellitus without complication, without long-term current use of insulin  Assessment & Plan:  Diabetes is stable.   Continue current treatment regimen.  Recommended an ADA diet.  Discussed foot care.  Reminded to get yearly retinal exam.  Diabetes will be reassessed in 6 months    Orders:  -     Basic Metabolic Panel; Future  -     Hemoglobin A1c; Future  -     Microalbumin / Creatinine Urine Ratio - Urine, Clean Catch; Future  -     Semaglutide,0.25 or 0.5MG/DOS, (Ozempic, 0.25 or 0.5 MG/DOSE,) 2 MG/3ML solution pen-injector; Inject 0.25 mg under the skin into the appropriate area as directed 1 (One) Time Per Week.  Dispense: 3 mL; Refill: 0    3. Hyperlipidemia, unspecified hyperlipidemia type  Assessment & Plan:   Lipid abnormalities are stable    Plan:  Continue same medication/s without change.      Counseled patient on lifestyle modifications to help control hyperlipidemia.   Cholesterol lowering dietary information  shared with patient.  Advised patient to exercise for 150 minutes weekly. (30 minute brisk walk, 5 days a week for example)  Weight Loss encouraged    Patient Treatment Goals:   LDL goal less than 70    Followup in 6 months.    Orders:  -     Lipid Panel With / Chol / HDL Ratio; Future    4. Hypothyroidism, unspecified type  Assessment & Plan:  TSH improving. To continue on current dose of levothyroxine. Counseled patient to take medication alone on an empty morning in the morning before breakfast. Will repeat TSH at the next schedule apt    Orders:  -     TSH+Free T4; Future    5. Class 2 severe obesity due to excess calories with serious comorbidity and body mass index (BMI) of 36.0 to 36.9 in adult  Assessment & Plan:  Patient's (Body mass index is 36.36 kg/m².) indicates that they are obese (BMI >30) with health conditions that include hypertension, diabetes mellitus, and dyslipidemias . Weight is unchanged. BMI  is above average; BMI management plan is completed. We discussed low calorie, low carb based diet program, portion control, increasing exercise, and pharmacologic options including GLP-1 agonist. Will start him on Semaglutide in favour of weight loss and glycemic control .     Orders:  -     Semaglutide,0.25 or 0.5MG/DOS, (Ozempic, 0.25 or 0.5 MG/DOSE,) 2 MG/3ML solution pen-injector; Inject 0.25 mg under the skin into the appropriate area as directed 1 (One) Time Per Week.  Dispense: 3 mL; Refill: 0         Recommend COVID, flu and RSV vaccination.  Patient is allergic to egg derived products.    Return in about 6 months (around 5/8/2025) for Follow up with fasting labs.

## 2024-11-09 PROBLEM — E66.812 CLASS 2 SEVERE OBESITY DUE TO EXCESS CALORIES WITH SERIOUS COMORBIDITY AND BODY MASS INDEX (BMI) OF 36.0 TO 36.9 IN ADULT: Status: ACTIVE | Noted: 2024-11-09

## 2024-11-09 PROBLEM — E66.811 OBESITY (BMI 30.0-34.9): Status: RESOLVED | Noted: 2018-10-24 | Resolved: 2024-11-09

## 2024-11-09 PROBLEM — E66.01 CLASS 2 SEVERE OBESITY DUE TO EXCESS CALORIES WITH SERIOUS COMORBIDITY AND BODY MASS INDEX (BMI) OF 36.0 TO 36.9 IN ADULT: Status: ACTIVE | Noted: 2024-11-09

## 2024-11-10 NOTE — ASSESSMENT & PLAN NOTE
Diabetes is stable.   Continue current treatment regimen.  Recommended an ADA diet.  Discussed foot care.  Reminded to get yearly retinal exam.  Diabetes will be reassessed in 6 months

## 2024-11-10 NOTE — ASSESSMENT & PLAN NOTE
Patient's (Body mass index is 36.36 kg/m².) indicates that they are obese (BMI >30) with health conditions that include hypertension, diabetes mellitus, and dyslipidemias . Weight is unchanged. BMI  is above average; BMI management plan is completed. We discussed low calorie, low carb based diet program, portion control, increasing exercise, and pharmacologic options including GLP-1 agonist. Will start him on Semaglutide in favour of weight loss and glycemic control .

## 2024-11-10 NOTE — ASSESSMENT & PLAN NOTE
Lipid abnormalities are stable    Plan:  Continue same medication/s without change.      Counseled patient on lifestyle modifications to help control hyperlipidemia.   Cholesterol lowering dietary information shared with patient.  Advised patient to exercise for 150 minutes weekly. (30 minute brisk walk, 5 days a week for example)  Weight Loss encouraged    Patient Treatment Goals:   LDL goal less than 70    Followup in 6 months.

## 2024-11-10 NOTE — ASSESSMENT & PLAN NOTE
TSH improving. To continue on current dose of levothyroxine. Counseled patient to take medication alone on an empty morning in the morning before breakfast. Will repeat TSH at the next schedule apt

## 2024-11-13 ENCOUNTER — TELEPHONE (OUTPATIENT)
Dept: FAMILY MEDICINE CLINIC | Facility: CLINIC | Age: 74
End: 2024-11-13

## 2024-11-13 NOTE — TELEPHONE ENCOUNTER
Pharmacy Name:  [phone]     Pharmacy representative name: CARSON    Pharmacy representative phone number: [phone]     What medication are you calling in regards to: Semaglutide,0.25 or 0.5MG/DOS, (Ozempic, 0.25 or 0.5 MG/DOSE,) 2 MG/3ML solution pen-injector     What question does the pharmacy have: PHARMACY IS REQUESTING TO KNOW IF PATIENT WILL TITRATE THEIR DOSE, OR IF THE PATIENT WILL BE STAYING AT 0.25 MG.    PHARMACY STATED IF THE PATIENT WILL NOT BE TITRATING, THE PATIENT WILL NEED A SEPARATE PRESCRIPTION FOR PEN NEEDLES.    Who is the provider that prescribed the medication: DR PATEL    Additional notes: PLEASE CALL TO DISCUSS.

## 2024-11-15 DIAGNOSIS — E11.9 TYPE 2 DIABETES MELLITUS WITHOUT COMPLICATION, WITHOUT LONG-TERM CURRENT USE OF INSULIN: ICD-10-CM

## 2024-11-15 DIAGNOSIS — E66.812 CLASS 2 SEVERE OBESITY DUE TO EXCESS CALORIES WITH SERIOUS COMORBIDITY AND BODY MASS INDEX (BMI) OF 36.0 TO 36.9 IN ADULT: ICD-10-CM

## 2024-11-15 DIAGNOSIS — E66.01 CLASS 2 SEVERE OBESITY DUE TO EXCESS CALORIES WITH SERIOUS COMORBIDITY AND BODY MASS INDEX (BMI) OF 36.0 TO 36.9 IN ADULT: ICD-10-CM

## 2024-11-15 RX ORDER — SEMAGLUTIDE 0.68 MG/ML
0.25 INJECTION, SOLUTION SUBCUTANEOUS WEEKLY
Qty: 9 ML | Refills: 0 | Status: SHIPPED | OUTPATIENT
Start: 2024-11-15

## 2024-12-18 RX ORDER — AMLODIPINE BESYLATE 5 MG/1
5 TABLET ORAL DAILY
Qty: 90 TABLET | Refills: 1 | Status: SHIPPED | OUTPATIENT
Start: 2024-12-18

## 2024-12-18 RX ORDER — LEVOTHYROXINE SODIUM 200 UG/1
200 TABLET ORAL DAILY
Qty: 90 TABLET | Refills: 3 | Status: SHIPPED | OUTPATIENT
Start: 2024-12-18

## 2025-01-16 RX ORDER — BENAZEPRIL HYDROCHLORIDE 10 MG/1
10 TABLET ORAL DAILY
Qty: 90 TABLET | Refills: 3 | Status: SHIPPED | OUTPATIENT
Start: 2025-01-16

## 2025-01-21 DIAGNOSIS — E11.9 TYPE 2 DIABETES MELLITUS WITHOUT COMPLICATION, WITHOUT LONG-TERM CURRENT USE OF INSULIN: Primary | ICD-10-CM

## 2025-01-21 RX ORDER — PIOGLITAZONE 30 MG/1
30 TABLET ORAL DAILY
Qty: 90 TABLET | Refills: 3 | Status: SHIPPED | OUTPATIENT
Start: 2025-01-21

## 2025-02-24 ENCOUNTER — HOSPITAL ENCOUNTER (INPATIENT)
Facility: HOSPITAL | Age: 75
LOS: 3 days | Discharge: NURSING, REHAB OR LONG-TERM CARE | End: 2025-02-27
Attending: EMERGENCY MEDICINE | Admitting: STUDENT IN AN ORGANIZED HEALTH CARE EDUCATION/TRAINING PROGRAM
Payer: MEDICARE

## 2025-02-24 ENCOUNTER — APPOINTMENT (OUTPATIENT)
Dept: GENERAL RADIOLOGY | Facility: HOSPITAL | Age: 75
End: 2025-02-24
Payer: MEDICARE

## 2025-02-24 ENCOUNTER — APPOINTMENT (OUTPATIENT)
Dept: CT IMAGING | Facility: HOSPITAL | Age: 75
End: 2025-02-24
Payer: MEDICARE

## 2025-02-24 DIAGNOSIS — S82.232A CLOSED DISPLACED OBLIQUE FRACTURE OF SHAFT OF LEFT TIBIA, INITIAL ENCOUNTER: Primary | ICD-10-CM

## 2025-02-24 DIAGNOSIS — S82.452A DISPLACED COMMINUTED FRACTURE OF SHAFT OF LEFT FIBULA, INITIAL ENCOUNTER FOR CLOSED FRACTURE: ICD-10-CM

## 2025-02-24 PROBLEM — E11.65 TYPE 2 DIABETES MELLITUS WITH HYPERGLYCEMIA, WITHOUT LONG-TERM CURRENT USE OF INSULIN: Status: ACTIVE | Noted: 2025-02-24

## 2025-02-24 PROBLEM — S82.202A LEFT TIBIAL FRACTURE: Status: ACTIVE | Noted: 2025-02-24

## 2025-02-24 PROBLEM — S82.402A FRACTURE OF LEFT FIBULA: Status: ACTIVE | Noted: 2025-02-24

## 2025-02-24 LAB
ALBUMIN SERPL-MCNC: 3.3 G/DL (ref 3.5–5.2)
ALBUMIN/GLOB SERPL: 0.9 G/DL
ALP SERPL-CCNC: 72 U/L (ref 39–117)
ALT SERPL W P-5'-P-CCNC: 13 U/L (ref 1–41)
ANION GAP SERPL CALCULATED.3IONS-SCNC: 10.7 MMOL/L (ref 5–15)
AST SERPL-CCNC: 19 U/L (ref 1–40)
BASOPHILS # BLD AUTO: 0.08 10*3/MM3 (ref 0–0.2)
BASOPHILS NFR BLD AUTO: 1.3 % (ref 0–1.5)
BILIRUB SERPL-MCNC: 0.4 MG/DL (ref 0–1.2)
BUN SERPL-MCNC: 13 MG/DL (ref 8–23)
BUN/CREAT SERPL: 10.4 (ref 7–25)
CALCIUM SPEC-SCNC: 9.3 MG/DL (ref 8.6–10.5)
CHLORIDE SERPL-SCNC: 105 MMOL/L (ref 98–107)
CO2 SERPL-SCNC: 25.3 MMOL/L (ref 22–29)
CREAT SERPL-MCNC: 1.25 MG/DL (ref 0.76–1.27)
DEPRECATED RDW RBC AUTO: 44.2 FL (ref 37–54)
EGFRCR SERPLBLD CKD-EPI 2021: 59.7 ML/MIN/1.73
EOSINOPHIL # BLD AUTO: 0.1 10*3/MM3 (ref 0–0.4)
EOSINOPHIL NFR BLD AUTO: 1.7 % (ref 0.3–6.2)
ERYTHROCYTE [DISTWIDTH] IN BLOOD BY AUTOMATED COUNT: 13.1 % (ref 12.3–15.4)
GLOBULIN UR ELPH-MCNC: 3.5 GM/DL
GLUCOSE BLDC GLUCOMTR-MCNC: 119 MG/DL (ref 70–130)
GLUCOSE SERPL-MCNC: 120 MG/DL (ref 65–99)
HBA1C MFR BLD: 6.3 % (ref 4.8–5.6)
HCT VFR BLD AUTO: 40 % (ref 37.5–51)
HGB BLD-MCNC: 12.7 G/DL (ref 13–17.7)
IMM GRANULOCYTES # BLD AUTO: 0.02 10*3/MM3 (ref 0–0.05)
IMM GRANULOCYTES NFR BLD AUTO: 0.3 % (ref 0–0.5)
LYMPHOCYTES # BLD AUTO: 1.23 10*3/MM3 (ref 0.7–3.1)
LYMPHOCYTES NFR BLD AUTO: 20.7 % (ref 19.6–45.3)
MCH RBC QN AUTO: 29.4 PG (ref 26.6–33)
MCHC RBC AUTO-ENTMCNC: 31.8 G/DL (ref 31.5–35.7)
MCV RBC AUTO: 92.6 FL (ref 79–97)
MONOCYTES # BLD AUTO: 0.38 10*3/MM3 (ref 0.1–0.9)
MONOCYTES NFR BLD AUTO: 6.4 % (ref 5–12)
NEUTROPHILS NFR BLD AUTO: 4.12 10*3/MM3 (ref 1.7–7)
NEUTROPHILS NFR BLD AUTO: 69.6 % (ref 42.7–76)
NRBC BLD AUTO-RTO: 0 /100 WBC (ref 0–0.2)
PLATELET # BLD AUTO: 186 10*3/MM3 (ref 140–450)
PMV BLD AUTO: 10.7 FL (ref 6–12)
POTASSIUM SERPL-SCNC: 4.1 MMOL/L (ref 3.5–5.2)
PROT SERPL-MCNC: 6.8 G/DL (ref 6–8.5)
RBC # BLD AUTO: 4.32 10*6/MM3 (ref 4.14–5.8)
SODIUM SERPL-SCNC: 141 MMOL/L (ref 136–145)
WBC NRBC COR # BLD AUTO: 5.93 10*3/MM3 (ref 3.4–10.8)

## 2025-02-24 PROCEDURE — 25010000002 ONDANSETRON PER 1 MG

## 2025-02-24 PROCEDURE — 25010000002 MORPHINE PER 10 MG: Performed by: EMERGENCY MEDICINE

## 2025-02-24 PROCEDURE — 25010000002 MORPHINE PER 10 MG: Performed by: STUDENT IN AN ORGANIZED HEALTH CARE EDUCATION/TRAINING PROGRAM

## 2025-02-24 PROCEDURE — 73610 X-RAY EXAM OF ANKLE: CPT

## 2025-02-24 PROCEDURE — 93005 ELECTROCARDIOGRAM TRACING: CPT | Performed by: NURSE PRACTITIONER

## 2025-02-24 PROCEDURE — 73590 X-RAY EXAM OF LOWER LEG: CPT

## 2025-02-24 PROCEDURE — 73700 CT LOWER EXTREMITY W/O DYE: CPT

## 2025-02-24 PROCEDURE — 25010000002 ONDANSETRON PER 1 MG: Performed by: EMERGENCY MEDICINE

## 2025-02-24 PROCEDURE — 85025 COMPLETE CBC W/AUTO DIFF WBC: CPT | Performed by: EMERGENCY MEDICINE

## 2025-02-24 PROCEDURE — 2W3RX1Z IMMOBILIZATION OF LEFT LOWER LEG USING SPLINT: ICD-10-PCS | Performed by: EMERGENCY MEDICINE

## 2025-02-24 PROCEDURE — 93010 ELECTROCARDIOGRAM REPORT: CPT | Performed by: STUDENT IN AN ORGANIZED HEALTH CARE EDUCATION/TRAINING PROGRAM

## 2025-02-24 PROCEDURE — 80053 COMPREHEN METABOLIC PANEL: CPT | Performed by: EMERGENCY MEDICINE

## 2025-02-24 PROCEDURE — 82948 REAGENT STRIP/BLOOD GLUCOSE: CPT

## 2025-02-24 PROCEDURE — 83036 HEMOGLOBIN GLYCOSYLATED A1C: CPT

## 2025-02-24 PROCEDURE — 99285 EMERGENCY DEPT VISIT HI MDM: CPT

## 2025-02-24 PROCEDURE — 76376 3D RENDER W/INTRP POSTPROCES: CPT

## 2025-02-24 RX ORDER — ACETAMINOPHEN 325 MG/1
650 TABLET ORAL EVERY 4 HOURS PRN
Status: DISCONTINUED | OUTPATIENT
Start: 2025-02-24 | End: 2025-02-27 | Stop reason: HOSPADM

## 2025-02-24 RX ORDER — MORPHINE SULFATE 2 MG/ML
4 INJECTION, SOLUTION INTRAMUSCULAR; INTRAVENOUS ONCE
Status: COMPLETED | OUTPATIENT
Start: 2025-02-24 | End: 2025-02-24

## 2025-02-24 RX ORDER — DEXTROSE MONOHYDRATE 25 G/50ML
25 INJECTION, SOLUTION INTRAVENOUS
Status: DISCONTINUED | OUTPATIENT
Start: 2025-02-24 | End: 2025-02-27 | Stop reason: HOSPADM

## 2025-02-24 RX ORDER — INSULIN LISPRO 100 [IU]/ML
2-7 INJECTION, SOLUTION INTRAVENOUS; SUBCUTANEOUS
Status: DISCONTINUED | OUTPATIENT
Start: 2025-02-24 | End: 2025-02-27 | Stop reason: HOSPADM

## 2025-02-24 RX ORDER — BISACODYL 10 MG
10 SUPPOSITORY, RECTAL RECTAL DAILY PRN
Status: DISCONTINUED | OUTPATIENT
Start: 2025-02-24 | End: 2025-02-25

## 2025-02-24 RX ORDER — LISINOPRIL 10 MG/1
10 TABLET ORAL
Status: DISCONTINUED | OUTPATIENT
Start: 2025-02-25 | End: 2025-02-27 | Stop reason: HOSPADM

## 2025-02-24 RX ORDER — ONDANSETRON 2 MG/ML
4 INJECTION INTRAMUSCULAR; INTRAVENOUS EVERY 6 HOURS PRN
Status: DISCONTINUED | OUTPATIENT
Start: 2025-02-24 | End: 2025-02-27 | Stop reason: HOSPADM

## 2025-02-24 RX ORDER — BISACODYL 5 MG/1
5 TABLET, DELAYED RELEASE ORAL DAILY PRN
Status: DISCONTINUED | OUTPATIENT
Start: 2025-02-24 | End: 2025-02-25

## 2025-02-24 RX ORDER — AMLODIPINE BESYLATE 5 MG/1
5 TABLET ORAL DAILY
Status: DISCONTINUED | OUTPATIENT
Start: 2025-02-25 | End: 2025-02-27 | Stop reason: HOSPADM

## 2025-02-24 RX ORDER — AMOXICILLIN 250 MG
2 CAPSULE ORAL 2 TIMES DAILY PRN
Status: DISCONTINUED | OUTPATIENT
Start: 2025-02-24 | End: 2025-02-25

## 2025-02-24 RX ORDER — ONDANSETRON 4 MG/1
4 TABLET, ORALLY DISINTEGRATING ORAL EVERY 6 HOURS PRN
Status: DISCONTINUED | OUTPATIENT
Start: 2025-02-24 | End: 2025-02-27 | Stop reason: HOSPADM

## 2025-02-24 RX ORDER — MORPHINE SULFATE 2 MG/ML
2 INJECTION, SOLUTION INTRAMUSCULAR; INTRAVENOUS EVERY 4 HOURS PRN
Status: COMPLETED | OUTPATIENT
Start: 2025-02-24 | End: 2025-02-25

## 2025-02-24 RX ORDER — IBUPROFEN 600 MG/1
1 TABLET ORAL
Status: DISCONTINUED | OUTPATIENT
Start: 2025-02-24 | End: 2025-02-27 | Stop reason: HOSPADM

## 2025-02-24 RX ORDER — ONDANSETRON 2 MG/ML
4 INJECTION INTRAMUSCULAR; INTRAVENOUS ONCE
Status: COMPLETED | OUTPATIENT
Start: 2025-02-24 | End: 2025-02-24

## 2025-02-24 RX ORDER — LEVOTHYROXINE SODIUM 100 UG/1
200 TABLET ORAL DAILY
Status: DISCONTINUED | OUTPATIENT
Start: 2025-02-25 | End: 2025-02-27 | Stop reason: HOSPADM

## 2025-02-24 RX ORDER — LANOLIN ALCOHOL/MO/W.PET/CERES
1000 CREAM (GRAM) TOPICAL DAILY
COMMUNITY

## 2025-02-24 RX ORDER — POLYETHYLENE GLYCOL 3350 17 G/17G
17 POWDER, FOR SOLUTION ORAL DAILY PRN
Status: DISCONTINUED | OUTPATIENT
Start: 2025-02-24 | End: 2025-02-25

## 2025-02-24 RX ORDER — NICOTINE POLACRILEX 4 MG
15 LOZENGE BUCCAL
Status: DISCONTINUED | OUTPATIENT
Start: 2025-02-24 | End: 2025-02-27 | Stop reason: HOSPADM

## 2025-02-24 RX ADMIN — MORPHINE SULFATE 2 MG: 2 INJECTION, SOLUTION INTRAMUSCULAR; INTRAVENOUS at 23:04

## 2025-02-24 RX ADMIN — ONDANSETRON 4 MG: 2 INJECTION, SOLUTION INTRAMUSCULAR; INTRAVENOUS at 17:40

## 2025-02-24 RX ADMIN — MORPHINE SULFATE 4 MG: 2 INJECTION, SOLUTION INTRAMUSCULAR; INTRAVENOUS at 17:41

## 2025-02-24 RX ADMIN — MORPHINE SULFATE 4 MG: 2 INJECTION, SOLUTION INTRAMUSCULAR; INTRAVENOUS at 18:42

## 2025-02-24 RX ADMIN — ONDANSETRON 4 MG: 2 INJECTION INTRAMUSCULAR; INTRAVENOUS at 23:04

## 2025-02-24 NOTE — ED NOTES
Pt arrives via EMS. Pt fell in his shed and his left ankle was trapped between a deep freeze and a . Arrives with it splinted via EMS. Reports there was obvious deformity.

## 2025-02-24 NOTE — ED PROVIDER NOTES
EMERGENCY DEPARTMENT ENCOUNTER  Room Number:  S03/03  PCP: Veronica Cooper MD  Independent Historians: Patient  Date of encounter:  2/24/2025  Patient Care Team:  Veronica Cooper MD as PCP - General (Internal Medicine)  Leonel Cage MD as Consulting Physician (Gastroenterology)  Leonel Cage MD as Referring Physician (Gastroenterology)  Kev Latham II, MD as Consulting Physician (Hematology and Oncology)     HPI:  Chief Complaint: had concerns including Ankle Injury.     A complete HPI/ROS/PMH/PSH/SH/FH are unobtainable due to: None    Chronic or social conditions impacting patient care (Social Determinants of Health): None    Context: Marco Antonio Kendrick is a 74 y.o. male with a medical history of colon cancer, sick sinus syndrome status post pacemaker, hypertension, diabetes who presents to the ED c/o acute ankle pain.  Patient was in his shed when his foot got hung up on his lawnmower causing him to fall.  He states that he fell opposite direction of his foot.  He felt a pop in his left ankle.  He said pain on the medial aspect of the left ankle as well as the distal calf since that time.  No head injury or any other complaint.  Patient does not take any blood thinners.  Patient does not use any ambulatory assistance devices at baseline.    PAST MEDICAL HISTORY  Active Ambulatory Problems     Diagnosis Date Noted    Cancer of sigmoid colon 04/19/2016    Sick sinus syndrome 03/09/2017    Primary hypertension 03/09/2017    History of permanent cardiac pacemaker placement 01/24/2018    Hypothyroidism 01/24/2018    Type 2 diabetes mellitus without complication 03/01/2018    Hyperlipidemia 06/08/2018    Family history of colon cancer 05/18/2021    Screening PSA (prostate specific antigen) 07/07/2022    Class 2 severe obesity due to excess calories with serious comorbidity and body mass index (BMI) of 36.0 to 36.9 in adult 11/09/2024     Resolved Ambulatory Problems     Diagnosis Date Noted     Long-term (current) use of anticoagulants 04/07/2016    Obesity (BMI 30.0-34.9) 10/24/2018     Past Medical History:   Diagnosis Date    Arthritis     Bipolar disorder     Bladder wall thickening     Bone marrow suppression     Depression     Disease of thyroid gland     Diverticula of colon     Enlarged prostate     H/O allergy to eggs     Headache     Hypertension     Hyperthyroidism     Obesity     Pneumonia 11/2015    PONV (postoperative nausea and vomiting)     Splenomegaly        PAST SURGICAL HISTORY  Past Surgical History:   Procedure Laterality Date    APPENDECTOMY      COLON SURGERY  2011    RESECTION    COLONOSCOPY  01/2015    DR. CAGE    COLONOSCOPY N/A 7/25/2016    Procedure: COLONOSCOPY to cecum and ti;  Surgeon: Leonel Cage MD;  Location: [facility] ENDOSCOPY;  Service:     COLONOSCOPY N/A 6/16/2021    Procedure: COLONOSCOPY TO CECUM (SIGMOID RESECTION);  Surgeon: Leonel Cage MD;  Location: [facility] ENDOSCOPY;  Service: Gastroenterology;  Laterality: N/A;  pre:  personal hx of polyps and colon ca  post: DIVERTICULOSIS, HEMORRHOIDS    CYSTOSCOPY      BY DR COBURN    PACEMAKER IMPLANTATION Left        FAMILY HISTORY  Family History   Problem Relation Age of Onset    Colon cancer Father     Melanoma Brother     Heart disease Other     Hypertension Other     Diabetes Other     Cancer Other     Stroke Other        SOCIAL HISTORY  Social History     Socioeconomic History    Marital status:      Spouse name: Carmelita    Number of children: 2   Tobacco Use    Smoking status: Never     Passive exposure: Never    Smokeless tobacco: Never    Tobacco comments:     caffeine use   Vaping Use    Vaping status: Never Used   Substance and Sexual Activity    Alcohol use: Yes     Comment: occasional    Drug use: No    Sexual activity: Defer       ALLERGIES  Egg-derived products    REVIEW OF SYSTEMS  Review of Systems  Included in HPI  All systems reviewed and negative except for those discussed in  HPI.    PHYSICAL EXAM    I have reviewed the triage vital signs and nursing notes.    ED Triage Vitals [02/24/25 1717]   Temp Heart Rate Resp BP SpO2   98.2 °F (36.8 °C) 63 19 127/55 96 %      Temp src Heart Rate Source Patient Position BP Location FiO2 (%)   Tympanic Monitor Lying -- --       Physical Exam  Constitutional:       General: He is not in acute distress.     Appearance: Normal appearance. He is not ill-appearing, toxic-appearing or diaphoretic.   HENT:      Head: Normocephalic and atraumatic.      Nose: Nose normal.      Mouth/Throat:      Mouth: Mucous membranes are moist.      Pharynx: Oropharynx is clear.   Eyes:      Conjunctiva/sclera: Conjunctivae normal.      Pupils: Pupils are equal, round, and reactive to light.   Cardiovascular:      Rate and Rhythm: Normal rate and regular rhythm.      Pulses: Normal pulses.   Pulmonary:      Effort: Pulmonary effort is normal.   Abdominal:      General: Abdomen is flat.      Palpations: Abdomen is soft.   Musculoskeletal:      Comments: Tenderness to the medial aspect of the left ankle, no obvious deformity or dislocation, 2+ DP PT pulse, able to move all toes, normal sensation   Skin:     General: Skin is warm and dry.   Neurological:      General: No focal deficit present.      Mental Status: He is alert and oriented to person, place, and time. Mental status is at baseline.   Psychiatric:         Mood and Affect: Mood normal.         Behavior: Behavior normal.         Thought Content: Thought content normal.         Judgment: Judgment normal.         LAB RESULTS  Recent Results (from the past 24 hours)   Comprehensive Metabolic Panel    Collection Time: 02/24/25  5:39 PM    Specimen: Blood   Result Value Ref Range    Glucose 120 (H) 65 - 99 mg/dL    BUN 13 8 - 23 mg/dL    Creatinine 1.25 0.76 - 1.27 mg/dL    Sodium 141 136 - 145 mmol/L    Potassium 4.1 3.5 - 5.2 mmol/L    Chloride 105 98 - 107 mmol/L    CO2 25.3 22.0 - 29.0 mmol/L    Calcium 9.3 8.6 - 10.5  mg/dL    Total Protein 6.8 6.0 - 8.5 g/dL    Albumin 3.3 (L) 3.5 - 5.2 g/dL    ALT (SGPT) 13 1 - 41 U/L    AST (SGOT) 19 1 - 40 U/L    Alkaline Phosphatase 72 39 - 117 U/L    Total Bilirubin 0.4 0.0 - 1.2 mg/dL    Globulin 3.5 gm/dL    A/G Ratio 0.9 g/dL    BUN/Creatinine Ratio 10.4 7.0 - 25.0    Anion Gap 10.7 5.0 - 15.0 mmol/L    eGFR 59.7 (L) >60.0 mL/min/1.73   CBC Auto Differential    Collection Time: 02/24/25  5:39 PM    Specimen: Blood   Result Value Ref Range    WBC 5.93 3.40 - 10.80 10*3/mm3    RBC 4.32 4.14 - 5.80 10*6/mm3    Hemoglobin 12.7 (L) 13.0 - 17.7 g/dL    Hematocrit 40.0 37.5 - 51.0 %    MCV 92.6 79.0 - 97.0 fL    MCH 29.4 26.6 - 33.0 pg    MCHC 31.8 31.5 - 35.7 g/dL    RDW 13.1 12.3 - 15.4 %    RDW-SD 44.2 37.0 - 54.0 fl    MPV 10.7 6.0 - 12.0 fL    Platelets 186 140 - 450 10*3/mm3    Neutrophil % 69.6 42.7 - 76.0 %    Lymphocyte % 20.7 19.6 - 45.3 %    Monocyte % 6.4 5.0 - 12.0 %    Eosinophil % 1.7 0.3 - 6.2 %    Basophil % 1.3 0.0 - 1.5 %    Immature Grans % 0.3 0.0 - 0.5 %    Neutrophils, Absolute 4.12 1.70 - 7.00 10*3/mm3    Lymphocytes, Absolute 1.23 0.70 - 3.10 10*3/mm3    Monocytes, Absolute 0.38 0.10 - 0.90 10*3/mm3    Eosinophils, Absolute 0.10 0.00 - 0.40 10*3/mm3    Basophils, Absolute 0.08 0.00 - 0.20 10*3/mm3    Immature Grans, Absolute 0.02 0.00 - 0.05 10*3/mm3    nRBC 0.0 0.0 - 0.2 /100 WBC       RADIOLOGY  XR Ankle 3+ View Left, XR Tibia Fibula 2 View Left    Result Date: 2/24/2025  XR ANKLE 3+ VW LEFT-, XR TIBIA FIBULA 2 VW LEFT-  HISTORY: 74-year-old male status post a fall.  FINDINGS: There is an obliquely oriented distal tibial shaft fracture with 8 mm lateral displacement. There is also a nondisplaced fracture component which extends into the distal tibial metaphysis. There is a obliquely oriented fibular fracture with 5 mm lateral displacement. Ankle joint appears intact.       MEDICATIONS GIVEN IN ER  Medications   morphine injection 4 mg (4 mg Intravenous Given 2/24/25  1741)   ondansetron (ZOFRAN) injection 4 mg (4 mg Intravenous Given 2/24/25 1740)   morphine injection 4 mg (4 mg Intravenous Given 2/24/25 1842)       ORDERS PLACED DURING THIS VISIT:  Orders Placed This Encounter   Procedures    XR Ankle 3+ View Left    XR Tibia Fibula 2 View Left    CT Lower Extremity Left Without Contrast    Comprehensive Metabolic Panel    CBC Auto Differential    NPO Diet NPO Type: Strict NPO    Ortho (on-call MD unless specified)    [facility] (on-call MD unless specified) Details    Inpatient Admission    CBC & Differential       OUTPATIENT MEDICATION MANAGEMENT:  No current Epic-ordered facility-administered medications on file.     Current Outpatient Medications Ordered in Epic   Medication Sig Dispense Refill    amLODIPine (NORVASC) 5 MG tablet Take 1 tablet by mouth Daily. 90 tablet 01    aspirin 81 MG chewable tablet Chew 1 tablet Daily.      benazepril (LOTENSIN) 10 MG tablet TAKE 1 TABLET EVERY DAY 90 tablet 3    Cholecalciferol (VITAMIN D PO) Take  by mouth.      fluticasone (FLONASE) 50 MCG/ACT nasal spray 2 sprays into the nostril(s) as directed by provider Daily for 14 days. 16 g 0    ibuprofen (ADVIL,MOTRIN) 600 MG tablet Take 1 tablet by mouth Every 6 (Six) Hours As Needed for Moderate Pain . 30 tablet 0    levothyroxine (SYNTHROID, LEVOTHROID) 200 MCG tablet TAKE 1 TABLET EVERY DAY 90 tablet 3    Magnesium 200 MG tablet Take 1 tablet by mouth As Needed.      metFORMIN (GLUCOPHAGE) 500 MG tablet Take 2 tablets by mouth 2 (Two) Times a Day With Meals. 360 tablet 3    pioglitazone (ACTOS) 30 MG tablet Take 1 tablet by mouth Daily. 90 tablet 3    rosuvastatin (Crestor) 20 MG tablet Take 1 tablet by mouth Daily. 90 tablet 3    Semaglutide,0.25 or 0.5MG/DOS, (Ozempic, 0.25 or 0.5 MG/DOSE,) 2 MG/3ML solution pen-injector Inject 0.25 mg under the skin into the appropriate area as directed 1 (One) Time Per Week. 9 mL 0    sertraline (ZOLOFT) 100 MG tablet Take 1.5 tablets by mouth Daily. 135  tablet 3       PROCEDURES  Procedures    PROGRESS, DATA ANALYSIS, CONSULTS, AND MEDICAL DECISION MAKING  All labs have been independently interpreted by me.  All radiology studies have been reviewed by me. All EKG's have been independently viewed and interpreted by me.  Discussion below represents my analysis of pertinent findings related to patient's condition, differential diagnosis, treatment plan and final disposition.    Differential diagnosis includes but is not limited to fracture, dislocation, ankle sprain.    Clinical Scores:                   ED Course as of 02/24/25 1905 Mon Feb 24, 2025 1757 WBC: 5.93 [AB]   1757 Hemoglobin(!): 12.7 [AB]   1814 XR Tibia Fibula 2 View Left  My independent interpretation of the imaging study is spiral fracture of the distal tibia with proximal fibula fracture [AB]   1837 Phone call with Dr. Bermudez with Ortho.  Discussed the patient, relevant history, exam, diagnostics, ED findings/progress, and concerns. They agree to consult.  They request patient be n.p.o. at midnight.  Request CT imaging of the left lower extremity. Request patient be placed in short leg splint.  [AB]   1904 Phone call with Meaghan with [facility].  Discussed the patient, relevant history, exam, diagnostics, ED findings/progress, and concerns. They agree to admit the patient to m/s under Dr. Harris. Care assumed by the admitting physician at this time. [AB]      ED Course User Index  [AB] Yadiel Croft MD             AS OF 19:05 EST VITALS:    BP - 128/69  HR - 62  TEMP - 98.2 °F (36.8 °C) (Tympanic)  O2 SATS - 96%    COMPLEXITY OF CARE  The patient requires admission.      DIAGNOSIS  Final diagnoses:   Closed displaced oblique fracture of shaft of left tibia, initial encounter   Displaced comminuted fracture of shaft of left fibula, initial encounter for closed fracture         DISPOSITION  ED Disposition       ED Disposition   Decision to Admit    Condition   --    Comment   Level of Care: Med/Surg  [1]   Diagnosis: Left tibial fracture [[id number]]   Admitting Physician: ADOLPH CHIN [[id number]]   Attending Physician: ADOLPH CHIN [[id number]]   Isolate for COVID?: No [0]   Certification: I Certify That Inpatient Hospital Services Are Medically Necessary For Greater Than 2 Midnights                  Please note that portions of this document were completed with a voice recognition program.    Note Disclaimer: At [facility], we believe that sharing information builds trust and better relationships. You are receiving this note because you recently visited [facility]. It is possible you will see health information before a provider has talked with you about it. This kind of information can be easy to misunderstand. To help you fully understand what it means for your health, we urge you to discuss this note with your provider.         Yadiel Croft MD  02/24/25 7831

## 2025-02-25 ENCOUNTER — ANESTHESIA (OUTPATIENT)
Dept: PERIOP | Facility: HOSPITAL | Age: 75
End: 2025-02-25
Payer: MEDICARE

## 2025-02-25 ENCOUNTER — ANESTHESIA EVENT (OUTPATIENT)
Dept: PERIOP | Facility: HOSPITAL | Age: 75
End: 2025-02-25
Payer: MEDICARE

## 2025-02-25 ENCOUNTER — APPOINTMENT (OUTPATIENT)
Dept: GENERAL RADIOLOGY | Facility: HOSPITAL | Age: 75
End: 2025-02-25
Payer: MEDICARE

## 2025-02-25 LAB
ANION GAP SERPL CALCULATED.3IONS-SCNC: 9 MMOL/L (ref 5–15)
BUN SERPL-MCNC: 14 MG/DL (ref 8–23)
BUN/CREAT SERPL: 12.3 (ref 7–25)
CALCIUM SPEC-SCNC: 8.7 MG/DL (ref 8.6–10.5)
CHLORIDE SERPL-SCNC: 106 MMOL/L (ref 98–107)
CO2 SERPL-SCNC: 27 MMOL/L (ref 22–29)
CREAT SERPL-MCNC: 1.14 MG/DL (ref 0.76–1.27)
DEPRECATED RDW RBC AUTO: 43.2 FL (ref 37–54)
EGFRCR SERPLBLD CKD-EPI 2021: 66.7 ML/MIN/1.73
ERYTHROCYTE [DISTWIDTH] IN BLOOD BY AUTOMATED COUNT: 12.9 % (ref 12.3–15.4)
GLUCOSE BLDC GLUCOMTR-MCNC: 117 MG/DL (ref 70–130)
GLUCOSE BLDC GLUCOMTR-MCNC: 129 MG/DL (ref 70–130)
GLUCOSE BLDC GLUCOMTR-MCNC: 149 MG/DL (ref 70–130)
GLUCOSE BLDC GLUCOMTR-MCNC: 169 MG/DL (ref 70–130)
GLUCOSE SERPL-MCNC: 132 MG/DL (ref 65–99)
HCT VFR BLD AUTO: 34.3 % (ref 37.5–51)
HGB BLD-MCNC: 11.4 G/DL (ref 13–17.7)
MCH RBC QN AUTO: 30.6 PG (ref 26.6–33)
MCHC RBC AUTO-ENTMCNC: 33.2 G/DL (ref 31.5–35.7)
MCV RBC AUTO: 92.2 FL (ref 79–97)
PLATELET # BLD AUTO: 142 10*3/MM3 (ref 140–450)
PMV BLD AUTO: 11 FL (ref 6–12)
POTASSIUM SERPL-SCNC: 4.1 MMOL/L (ref 3.5–5.2)
RBC # BLD AUTO: 3.72 10*6/MM3 (ref 4.14–5.8)
SODIUM SERPL-SCNC: 142 MMOL/L (ref 136–145)
WBC NRBC COR # BLD AUTO: 6.51 10*3/MM3 (ref 3.4–10.8)

## 2025-02-25 PROCEDURE — C1713 ANCHOR/SCREW BN/BN,TIS/BN: HCPCS | Performed by: ORTHOPAEDIC SURGERY

## 2025-02-25 PROCEDURE — 25810000003 LACTATED RINGERS PER 1000 ML: Performed by: ANESTHESIOLOGY

## 2025-02-25 PROCEDURE — 25010000002 LIDOCAINE PF 2% 2 % SOLUTION: Performed by: ANESTHESIOLOGY

## 2025-02-25 PROCEDURE — C1769 GUIDE WIRE: HCPCS | Performed by: ORTHOPAEDIC SURGERY

## 2025-02-25 PROCEDURE — 25010000002 DEXAMETHASONE PER 1 MG: Performed by: ANESTHESIOLOGY

## 2025-02-25 PROCEDURE — 76000 FLUOROSCOPY <1 HR PHYS/QHP: CPT

## 2025-02-25 PROCEDURE — 0QHH06Z INSERTION OF INTRAMEDULLARY INTERNAL FIXATION DEVICE INTO LEFT TIBIA, OPEN APPROACH: ICD-10-PCS | Performed by: ORTHOPAEDIC SURGERY

## 2025-02-25 PROCEDURE — 25010000002 FENTANYL CITRATE (PF) 100 MCG/2ML SOLUTION: Performed by: ANESTHESIOLOGY

## 2025-02-25 PROCEDURE — 25010000002 ONDANSETRON PER 1 MG: Performed by: ANESTHESIOLOGY

## 2025-02-25 PROCEDURE — 36415 COLL VENOUS BLD VENIPUNCTURE: CPT

## 2025-02-25 PROCEDURE — 25010000002 HYDROMORPHONE PER 4 MG: Performed by: ANESTHESIOLOGY

## 2025-02-25 PROCEDURE — 85027 COMPLETE CBC AUTOMATED: CPT

## 2025-02-25 PROCEDURE — 25010000002 HYDROMORPHONE 1 MG/ML SOLUTION: Performed by: STUDENT IN AN ORGANIZED HEALTH CARE EDUCATION/TRAINING PROGRAM

## 2025-02-25 PROCEDURE — 25010000002 SUGAMMADEX 200 MG/2ML SOLUTION: Performed by: STUDENT IN AN ORGANIZED HEALTH CARE EDUCATION/TRAINING PROGRAM

## 2025-02-25 PROCEDURE — 25010000002 ONDANSETRON PER 1 MG: Performed by: ORTHOPAEDIC SURGERY

## 2025-02-25 PROCEDURE — 25010000002 CEFAZOLIN PER 500 MG: Performed by: ORTHOPAEDIC SURGERY

## 2025-02-25 PROCEDURE — 25010000002 MORPHINE PER 10 MG: Performed by: STUDENT IN AN ORGANIZED HEALTH CARE EDUCATION/TRAINING PROGRAM

## 2025-02-25 PROCEDURE — 80048 BASIC METABOLIC PNL TOTAL CA: CPT

## 2025-02-25 PROCEDURE — 82948 REAGENT STRIP/BLOOD GLUCOSE: CPT

## 2025-02-25 DEVICE — TRIGEN META TIBIAL NAIL 11.5MM X 34CM
Type: IMPLANTABLE DEVICE | Site: TIBIA | Status: FUNCTIONAL
Brand: TRIGEN

## 2025-02-25 DEVICE — TRIGEN LOW PROFILE SCREW 5.0MM X 32.5MM
Type: IMPLANTABLE DEVICE | Site: TIBIA | Status: FUNCTIONAL
Brand: TRIGEN

## 2025-02-25 DEVICE — TRIGEN LOW PROFILE SCREW 5.0MM X 50MM
Type: IMPLANTABLE DEVICE | Site: TIBIA | Status: FUNCTIONAL
Brand: TRIGEN

## 2025-02-25 DEVICE — TRIGEN LOW PROFILE SCREW 5.0MM X 42.5MM
Type: IMPLANTABLE DEVICE | Site: TIBIA | Status: FUNCTIONAL
Brand: TRIGEN

## 2025-02-25 DEVICE — TRIGEN LOW PROFILE SCREW 5.0MM X 37.5MM
Type: IMPLANTABLE DEVICE | Site: TIBIA | Status: FUNCTIONAL
Brand: TRIGEN

## 2025-02-25 DEVICE — TRIGEN LOW PROFILE SCREW 5.0MM X 30MM
Type: IMPLANTABLE DEVICE | Site: TIBIA | Status: FUNCTIONAL
Brand: TRIGEN

## 2025-02-25 RX ORDER — SODIUM CHLORIDE 0.9 % (FLUSH) 0.9 %
10 SYRINGE (ML) INJECTION AS NEEDED
Status: DISCONTINUED | OUTPATIENT
Start: 2025-02-25 | End: 2025-02-25 | Stop reason: HOSPADM

## 2025-02-25 RX ORDER — HYDROMORPHONE HYDROCHLORIDE 1 MG/ML
0.5 INJECTION, SOLUTION INTRAMUSCULAR; INTRAVENOUS; SUBCUTANEOUS
Status: DISCONTINUED | OUTPATIENT
Start: 2025-02-25 | End: 2025-02-25 | Stop reason: HOSPADM

## 2025-02-25 RX ORDER — ONDANSETRON 2 MG/ML
INJECTION INTRAMUSCULAR; INTRAVENOUS AS NEEDED
Status: DISCONTINUED | OUTPATIENT
Start: 2025-02-25 | End: 2025-02-25 | Stop reason: SURG

## 2025-02-25 RX ORDER — SODIUM CHLORIDE 9 MG/ML
40 INJECTION, SOLUTION INTRAVENOUS AS NEEDED
Status: DISCONTINUED | OUTPATIENT
Start: 2025-02-25 | End: 2025-02-25 | Stop reason: HOSPADM

## 2025-02-25 RX ORDER — FENTANYL CITRATE 50 UG/ML
50 INJECTION, SOLUTION INTRAMUSCULAR; INTRAVENOUS
Status: DISCONTINUED | OUTPATIENT
Start: 2025-02-25 | End: 2025-02-25 | Stop reason: HOSPADM

## 2025-02-25 RX ORDER — ROCURONIUM BROMIDE 10 MG/ML
INJECTION, SOLUTION INTRAVENOUS AS NEEDED
Status: DISCONTINUED | OUTPATIENT
Start: 2025-02-25 | End: 2025-02-25 | Stop reason: SURG

## 2025-02-25 RX ORDER — HYDRALAZINE HYDROCHLORIDE 20 MG/ML
5 INJECTION INTRAMUSCULAR; INTRAVENOUS
Status: DISCONTINUED | OUTPATIENT
Start: 2025-02-25 | End: 2025-02-25 | Stop reason: HOSPADM

## 2025-02-25 RX ORDER — HYDROMORPHONE HYDROCHLORIDE 1 MG/ML
0.5 INJECTION, SOLUTION INTRAMUSCULAR; INTRAVENOUS; SUBCUTANEOUS
Status: DISCONTINUED | OUTPATIENT
Start: 2025-02-25 | End: 2025-02-27 | Stop reason: HOSPADM

## 2025-02-25 RX ORDER — LIDOCAINE HYDROCHLORIDE 20 MG/ML
INJECTION, SOLUTION INFILTRATION; PERINEURAL AS NEEDED
Status: DISCONTINUED | OUTPATIENT
Start: 2025-02-25 | End: 2025-02-25 | Stop reason: SURG

## 2025-02-25 RX ORDER — NALOXONE HCL 0.4 MG/ML
0.2 VIAL (ML) INJECTION AS NEEDED
Status: DISCONTINUED | OUTPATIENT
Start: 2025-02-25 | End: 2025-02-25 | Stop reason: HOSPADM

## 2025-02-25 RX ORDER — DIPHENHYDRAMINE HCL 25 MG
25 CAPSULE ORAL
Status: DISCONTINUED | OUTPATIENT
Start: 2025-02-25 | End: 2025-02-25 | Stop reason: HOSPADM

## 2025-02-25 RX ORDER — HYDROCODONE BITARTRATE AND ACETAMINOPHEN 5; 325 MG/1; MG/1
1 TABLET ORAL EVERY 6 HOURS PRN
Status: DISCONTINUED | OUTPATIENT
Start: 2025-02-25 | End: 2025-02-25

## 2025-02-25 RX ORDER — LABETALOL HYDROCHLORIDE 5 MG/ML
5 INJECTION, SOLUTION INTRAVENOUS
Status: DISCONTINUED | OUTPATIENT
Start: 2025-02-25 | End: 2025-02-25 | Stop reason: HOSPADM

## 2025-02-25 RX ORDER — FLUMAZENIL 0.1 MG/ML
0.2 INJECTION INTRAVENOUS AS NEEDED
Status: DISCONTINUED | OUTPATIENT
Start: 2025-02-25 | End: 2025-02-25 | Stop reason: HOSPADM

## 2025-02-25 RX ORDER — BISACODYL 10 MG
10 SUPPOSITORY, RECTAL RECTAL DAILY PRN
Status: DISCONTINUED | OUTPATIENT
Start: 2025-02-25 | End: 2025-02-27 | Stop reason: HOSPADM

## 2025-02-25 RX ORDER — MAGNESIUM HYDROXIDE 1200 MG/15ML
LIQUID ORAL AS NEEDED
Status: DISCONTINUED | OUTPATIENT
Start: 2025-02-25 | End: 2025-02-25 | Stop reason: HOSPADM

## 2025-02-25 RX ORDER — HYDROCODONE BITARTRATE AND ACETAMINOPHEN 7.5; 325 MG/1; MG/1
1 TABLET ORAL ONCE AS NEEDED
Status: COMPLETED | OUTPATIENT
Start: 2025-02-25 | End: 2025-02-25

## 2025-02-25 RX ORDER — FENTANYL CITRATE 50 UG/ML
50 INJECTION, SOLUTION INTRAMUSCULAR; INTRAVENOUS ONCE
Status: DISCONTINUED | OUTPATIENT
Start: 2025-02-25 | End: 2025-02-27 | Stop reason: HOSPADM

## 2025-02-25 RX ORDER — PROMETHAZINE HYDROCHLORIDE 25 MG/1
25 TABLET ORAL ONCE AS NEEDED
Status: DISCONTINUED | OUTPATIENT
Start: 2025-02-25 | End: 2025-02-25 | Stop reason: HOSPADM

## 2025-02-25 RX ORDER — EPHEDRINE SULFATE 50 MG/ML
5 INJECTION, SOLUTION INTRAVENOUS ONCE AS NEEDED
Status: DISCONTINUED | OUTPATIENT
Start: 2025-02-25 | End: 2025-02-25 | Stop reason: HOSPADM

## 2025-02-25 RX ORDER — PROMETHAZINE HYDROCHLORIDE 25 MG/1
25 SUPPOSITORY RECTAL ONCE AS NEEDED
Status: DISCONTINUED | OUTPATIENT
Start: 2025-02-25 | End: 2025-02-25 | Stop reason: HOSPADM

## 2025-02-25 RX ORDER — DIPHENHYDRAMINE HYDROCHLORIDE 50 MG/ML
12.5 INJECTION INTRAMUSCULAR; INTRAVENOUS
Status: DISCONTINUED | OUTPATIENT
Start: 2025-02-25 | End: 2025-02-25 | Stop reason: HOSPADM

## 2025-02-25 RX ORDER — FENTANYL CITRATE 50 UG/ML
INJECTION, SOLUTION INTRAMUSCULAR; INTRAVENOUS AS NEEDED
Status: DISCONTINUED | OUTPATIENT
Start: 2025-02-25 | End: 2025-02-25 | Stop reason: SURG

## 2025-02-25 RX ORDER — OXYCODONE AND ACETAMINOPHEN 7.5; 325 MG/1; MG/1
1 TABLET ORAL EVERY 4 HOURS PRN
Status: DISCONTINUED | OUTPATIENT
Start: 2025-02-25 | End: 2025-02-25 | Stop reason: HOSPADM

## 2025-02-25 RX ORDER — ENOXAPARIN SODIUM 100 MG/ML
40 INJECTION SUBCUTANEOUS DAILY
Status: DISCONTINUED | OUTPATIENT
Start: 2025-02-26 | End: 2025-02-27 | Stop reason: HOSPADM

## 2025-02-25 RX ORDER — SODIUM CHLORIDE, SODIUM LACTATE, POTASSIUM CHLORIDE, CALCIUM CHLORIDE 600; 310; 30; 20 MG/100ML; MG/100ML; MG/100ML; MG/100ML
9 INJECTION, SOLUTION INTRAVENOUS CONTINUOUS PRN
Status: ACTIVE | OUTPATIENT
Start: 2025-02-25 | End: 2025-02-26

## 2025-02-25 RX ORDER — AMOXICILLIN 250 MG
2 CAPSULE ORAL 2 TIMES DAILY
Status: DISCONTINUED | OUTPATIENT
Start: 2025-02-25 | End: 2025-02-27 | Stop reason: HOSPADM

## 2025-02-25 RX ORDER — SODIUM CHLORIDE 0.9 % (FLUSH) 0.9 %
10 SYRINGE (ML) INJECTION EVERY 12 HOURS SCHEDULED
Status: DISCONTINUED | OUTPATIENT
Start: 2025-02-25 | End: 2025-02-25 | Stop reason: HOSPADM

## 2025-02-25 RX ORDER — DEXAMETHASONE SODIUM PHOSPHATE 4 MG/ML
INJECTION, SOLUTION INTRA-ARTICULAR; INTRALESIONAL; INTRAMUSCULAR; INTRAVENOUS; SOFT TISSUE AS NEEDED
Status: DISCONTINUED | OUTPATIENT
Start: 2025-02-25 | End: 2025-02-25 | Stop reason: SURG

## 2025-02-25 RX ORDER — HYDROCODONE BITARTRATE AND ACETAMINOPHEN 7.5; 325 MG/1; MG/1
1 TABLET ORAL EVERY 4 HOURS PRN
Status: DISCONTINUED | OUTPATIENT
Start: 2025-02-25 | End: 2025-02-27 | Stop reason: HOSPADM

## 2025-02-25 RX ORDER — BISACODYL 5 MG/1
5 TABLET, DELAYED RELEASE ORAL DAILY PRN
Status: DISCONTINUED | OUTPATIENT
Start: 2025-02-25 | End: 2025-02-27 | Stop reason: HOSPADM

## 2025-02-25 RX ORDER — ONDANSETRON 2 MG/ML
4 INJECTION INTRAMUSCULAR; INTRAVENOUS ONCE AS NEEDED
Status: DISCONTINUED | OUTPATIENT
Start: 2025-02-25 | End: 2025-02-25 | Stop reason: HOSPADM

## 2025-02-25 RX ORDER — HYDROCODONE BITARTRATE AND ACETAMINOPHEN 7.5; 325 MG/1; MG/1
2 TABLET ORAL EVERY 4 HOURS PRN
Status: DISCONTINUED | OUTPATIENT
Start: 2025-02-25 | End: 2025-02-27 | Stop reason: HOSPADM

## 2025-02-25 RX ORDER — BUPIVACAINE HYDROCHLORIDE AND EPINEPHRINE 5; 5 MG/ML; UG/ML
INJECTION, SOLUTION PERINEURAL AS NEEDED
Status: DISCONTINUED | OUTPATIENT
Start: 2025-02-25 | End: 2025-02-25 | Stop reason: HOSPADM

## 2025-02-25 RX ORDER — POLYETHYLENE GLYCOL 3350 17 G/17G
17 POWDER, FOR SOLUTION ORAL DAILY PRN
Status: DISCONTINUED | OUTPATIENT
Start: 2025-02-25 | End: 2025-02-27 | Stop reason: HOSPADM

## 2025-02-25 RX ADMIN — DEXAMETHASONE SODIUM PHOSPHATE 8 MG: 4 INJECTION, SOLUTION INTRA-ARTICULAR; INTRALESIONAL; INTRAMUSCULAR; INTRAVENOUS; SOFT TISSUE at 15:47

## 2025-02-25 RX ADMIN — FENTANYL CITRATE 50 MCG: 50 INJECTION, SOLUTION INTRAMUSCULAR; INTRAVENOUS at 15:38

## 2025-02-25 RX ADMIN — SUGAMMADEX 200 MG: 100 INJECTION, SOLUTION INTRAVENOUS at 17:11

## 2025-02-25 RX ADMIN — LIDOCAINE HYDROCHLORIDE 100 MG: 20 INJECTION, SOLUTION EPIDURAL; INFILTRATION; INTRACAUDAL; PERINEURAL at 15:39

## 2025-02-25 RX ADMIN — MORPHINE SULFATE 2 MG: 2 INJECTION, SOLUTION INTRAMUSCULAR; INTRAVENOUS at 03:27

## 2025-02-25 RX ADMIN — ROCURONIUM BROMIDE 20 MG: 10 INJECTION INTRAVENOUS at 15:41

## 2025-02-25 RX ADMIN — HYDROMORPHONE HYDROCHLORIDE 0.5 MG: 1 INJECTION, SOLUTION INTRAMUSCULAR; INTRAVENOUS; SUBCUTANEOUS at 17:46

## 2025-02-25 RX ADMIN — LIDOCAINE HYDROCHLORIDE 100 MG: 20 INJECTION, SOLUTION EPIDURAL; INFILTRATION; INTRACAUDAL; PERINEURAL at 17:11

## 2025-02-25 RX ADMIN — ONDANSETRON 4 MG: 2 INJECTION INTRAMUSCULAR; INTRAVENOUS at 21:30

## 2025-02-25 RX ADMIN — SERTRALINE HYDROCHLORIDE 150 MG: 100 TABLET, FILM COATED ORAL at 09:27

## 2025-02-25 RX ADMIN — CEFAZOLIN 2000 MG: 2 INJECTION, POWDER, FOR SOLUTION INTRAMUSCULAR; INTRAVENOUS at 23:54

## 2025-02-25 RX ADMIN — LEVOTHYROXINE SODIUM 200 MCG: 100 TABLET ORAL at 09:27

## 2025-02-25 RX ADMIN — MORPHINE SULFATE 2 MG: 2 INJECTION, SOLUTION INTRAMUSCULAR; INTRAVENOUS at 09:27

## 2025-02-25 RX ADMIN — SODIUM CHLORIDE 2000 MG: 900 INJECTION INTRAVENOUS at 15:53

## 2025-02-25 RX ADMIN — LISINOPRIL 10 MG: 10 TABLET ORAL at 09:27

## 2025-02-25 RX ADMIN — SODIUM CHLORIDE, POTASSIUM CHLORIDE, SODIUM LACTATE AND CALCIUM CHLORIDE 9 ML/HR: 600; 310; 30; 20 INJECTION, SOLUTION INTRAVENOUS at 13:50

## 2025-02-25 RX ADMIN — SENNOSIDES AND DOCUSATE SODIUM 2 TABLET: 50; 8.6 TABLET ORAL at 00:30

## 2025-02-25 RX ADMIN — ONDANSETRON 4 MG: 2 INJECTION INTRAMUSCULAR; INTRAVENOUS at 15:47

## 2025-02-25 RX ADMIN — HYDROCODONE BITARTRATE AND ACETAMINOPHEN 1 TABLET: 7.5; 325 TABLET ORAL at 18:39

## 2025-02-25 RX ADMIN — HYDROMORPHONE HYDROCHLORIDE 0.5 MG: 1 INJECTION, SOLUTION INTRAMUSCULAR; INTRAVENOUS; SUBCUTANEOUS at 17:12

## 2025-02-25 NOTE — OP NOTE
Procedure Note    Marco Antonio Kendrick  2/25/2025    Pre-op Diagnosis:   Closed displaced oblique fracture of shaft of left tibia, initial encounter [S82.232A]       Post-Op Diagnosis Codes:     * Closed displaced oblique fracture of shaft of left tibia, initial encounter [S82.232A]    Procedure(s):  Intramedullary nailing, left tibia fracture (36455)        Surgeon(s):  Bi Bermudez Jr., MD    Assistant:  Dr. Marco Antonio Smith MD    The services of a first assist were necessary for performing the procedure safely and expeditiously.  Dr. Smith was present for the entire duration of the case and helped with positioning, prepping and draping, fracture reduction, retraction, and closure of the incisions.      Anesthesia: General    Estimated Blood Loss: 50cc    Specimens:                None      Drains: * No LDAs found *    Complications:   None apparent    Disposition:  Stable to PACU for recovery    Indications for procedure:  The patient is a very pleasant 74-year-old male who suffered a closed displaced left distal third tibial shaft fracture in association with a left proximal fibular fracture.  The above listed procedures were recommended.  The risks/benefits of surgery, including pain, infection, wound healing problems, need for future procedures, mal/nonunion, malrotation, compartment syndrome, DVT/PE, nerve or vessel injury, cardiac event, and/or death were discussed, and the patient elected to proceed with surgery.    Procedure in detail:    The correct patient was identified in preoperative holding.  All risks and benefits of surgery were again discussed in detail, and the patient agreed to proceed with surgery.  The operative extremity was confirmed and marked.  Operative consent reviewed and confirmed to be signed.    At this time, the patient was wheeled to the operative theatre and placed supine on the OR table.  Anesthesia was induced smoothly by our anesthesia colleagues.  The left lower extremity  was prepped and draped in standard sterile fashion.  Appropriate presurgical timeout was performed, confirming correct patient, correct extremity, correct procedure, availability of sterile instruments/implants, and the administration of intravenous antibiotics within one hour of skin incision.      The patella was palpated and a midline incision made over the distal patella/patellar tendon down to retinaculum over patella tendon.  Hemostasis achieved with Bovie electrocautery.  The medial 1/3 of the patellar tendon was carefully incised with a #15 blade.  Knee flexed up on a sterile, radiolucent triangle.  Appropriate starting point on proximal tibia marked with entry/guide wire, confirmed on AP/lat fluoroscopic views to be appropriate, which was anterior to articular surface on the lateral and medial aspect of lateral tibial spine on AP.  Wire was malleted in and soft tissue protector used while I performed entry reaming of proximal tibia under lateral fluoroscopic view.  All instrumentation removed and ball-tipped guide wire passed intramedullary up to the fracture, which was held reduced by inline, longitudinal traction and gentle internal rotation maneuver.  Guide wire then passed distally past the fracture and in center-center position at distal physeal scar.  AP and lateral views confirmed appropriate guidewire placement and anatomic fracture reduction.  We therefore proceeded with measuring for nail length, followed by reaming, which was done sequentially in appropriate increments from 9 mm up to 13 mm, which gave excellent chatter.  Angel [employer]hew nail of size 11.5 mm x 340 mm was selected and assembled to insertor on the field.  Nail inserted via manual force followed by gentle impaction.  Reduction, length, alignment, and rotation were anatomic as assessed with intraop fluoroscopy and direct inspection.  Isthmic fit was excellent.   Using batwing attachment, 2 proximal 5.0 mm oblique interlock screws of  "appropriate length placed in static holes in standard fashion. Distally, using a perfect circles technique with lateral fluoroscopy, 2-5.0 mm interlock screws were placed medial to lateral.  A third distal interlock screw was placed through a small stab incision over the anterior distal tibia after dissecting bluntly down to the distal tibia taking care to avoid protect the tendons as well as the neurovascular structures.  All interlock screws achieved excellent bicortical purchase.   Final fluoroscopic AP/lateral views of the tibia-fibula, ankle, and knee confirmed anatomic reduction, with appropriate length, rotation, and alignment and appropriate hardware placement/position of nail and interlock screws.  Nail and all interlock screws were of appropriate length and in appropriate position.  Finally AP/mortise/lateral flouroscopic views of the ankle confirmed stable, anatomic ankle syndesmosis statically and with ER and Cotton stress testing.  All incisions were thoroughly irrigated out with sterile saline solution.  Stab incisions for interlock screw placement were closed with 3-0 Vicryl and staples.  Knee incision was also thoroughly irrigated out of any debris and closed with  2-0 Vicryl in patellar tendon and retinaculum, 3-0 Vicryl in subcutaneous tissue, and staples on the  skin.  Leg compartments remained soft and compressible.  Foot remained with intact palpable DP and PT pulses, with brisk capillary refill to the toes < 2 sec.  Sterile bulky dressings applied and a standard well-padded splint placed with the ankle in neutral position.  Patient awoken from anesthesia without apparent complication and taken to PACU for recovery.    Postoperative Plan:  Weightbearing status: Non-weightbearing in the splint  DVT Prophylaxis: Lovenox 40mg daily;  Patient will be instructed to elevate as much as possible (\"toes above the nose\") and to get up and move around at least once per hour while awake to help minimize " risk of blood clots.           Bi Bermudez Jr, MD     Date: 2/25/2025  Time: 15:30 EST

## 2025-02-25 NOTE — PLAN OF CARE
Goal Outcome Evaluation:   Admit from ER for left tibia fracture. VSS. A&Ox4. Strict bedrest. Splint in place. NPO after midnight. 2mg morphine give x2 for pain. Tolerated well. Zofran given x1 for nauseau. Wife at bedside. Ortho was already consulted in the ER. Care continuing.

## 2025-02-25 NOTE — CONSULTS
[facility]   Consult Note    Patient Name: Marco Antonio Kendrick  : 1950  MRN: [id number]  Primary Care Physician:  Veronica Cooper MD  Referring Physician: Yadiel Croft MD  Date of admission: 2025    Ortho (on-call MD unless specified)  Consult performed by: Bi Bermudez Jr., MD  Consult ordered by: Yadiel Croft MD        Subjective   Subjective     Reason for Consult/ Chief Complaint: Left leg pain    History of Present Illness  Marco Antonio Kendrick is a 74 y.o. male with history of hypertension, hyperlipidemia, diabetes, sick sinus syndrome status post pacemaker placement, who presented with left lower extremity pain following mechanical fall.  He is on no anticoagulation.  He reports he was trying to clean out his shed and his foot got stuck awkwardly and he fell.  He had immediate pain and swelling.  He came to the emergency department where radiographs showed a mildly displaced tibial shaft fracture.  This was a closed injury per the emergency department staff.  He was admitted to the hospitalist service.  Orthopedics was consulted for evaluation and management.    The patient reports pain in the left lower extremity but it is controlled.    Review of Systems   Review of Systems:  Constitutional: Negative  HENT: Negative  Eyes: Negative  Respiratory: Negative; no shortness of breath  Cardiovascular: Negative; no current chest pain  Gastrointestinal: Negative  : Negative  Skin: Negative except as listed in HPI  Neurological: Negative, no numbness or deficits  Hematological: Negative  Muscoloskeletal: Per HPI                 Personal History     Past Medical History:   Diagnosis Date    Arthritis     Bipolar disorder     Bladder wall thickening     MILD NON-SPECIFIC URINARY BLADDER....FOLLOWED BY DR COBURN    Bone marrow suppression     SIGNIFICANT MYELOSUPRESSION FROM FOLFOX-4 CHEMOTHERAPY    Cancer of sigmoid colon     STAGE III (T3N1M0)    Depression     Disease of thyroid gland      HYPOTHYROIDISM    Diverticula of colon     Enlarged prostate     H/O allergy to eggs     Headache     Hyperlipidemia     Hypertension     Hyperthyroidism     Obesity     Pneumonia 11/2015    PONV (postoperative nausea and vomiting)     Sick sinus syndrome     S/P LEFT CHEST PACEMAKER IMPLANTATION    Splenomegaly        Past Surgical History:   Procedure Laterality Date    APPENDECTOMY      COLON SURGERY  2011    RESECTION    COLONOSCOPY  01/2015    DR. CAGE    COLONOSCOPY N/A 7/25/2016    Procedure: COLONOSCOPY to cecum and ti;  Surgeon: Leonel Cage MD;  Location: [facility] ENDOSCOPY;  Service:     COLONOSCOPY N/A 6/16/2021    Procedure: COLONOSCOPY TO CECUM (SIGMOID RESECTION);  Surgeon: Leonel Cage MD;  Location: [facility] ENDOSCOPY;  Service: Gastroenterology;  Laterality: N/A;  pre:  personal hx of polyps and colon ca  post: DIVERTICULOSIS, HEMORRHOIDS    CYSTOSCOPY      BY DR COBURN    PACEMAKER IMPLANTATION Left        Family History: family history includes Cancer in an other family member; Colon cancer in his father; Diabetes in an other family member; Heart disease in an other family member; Hypertension in an other family member; Melanoma in his brother; Stroke in an other family member. Otherwise pertinent FHx was reviewed and not pertinent to current issue.    Social History:  reports that he has never smoked. He has never been exposed to tobacco smoke. He has never used smokeless tobacco. He reports that he does not drink alcohol and does not use drugs.    Home Medications:   Magnesium, Semaglutide(0.25 or 0.5MG/DOS), Vitamin D, amLODIPine, aspirin, benazepril, fluticasone, ibuprofen, levothyroxine, metFORMIN, pioglitazone, rosuvastatin, sertraline, and vitamin B-12    Allergies:  Allergies   Allergen Reactions    Egg-Derived Products Nausea And Vomiting       Objective    Objective     Vitals:  Temp:  [97.2 °F (36.2 °C)-98.2 °F (36.8 °C)] 97.2 °F (36.2 °C)  Heart Rate:  [58-67] 58  Resp:   [18-20] 18  BP: (114-142)/(55-75) 114/63    Physical Exam  Physical Exam:  Vital signs reviewed.  Constitutional:  Appears well-developed, well nourished.  No acute distress.  Pulmonary: Effort normal, symmetric chest expansion, no labored breathing.  Abdominal: Soft, non distended  Neurological: No gross deficits, oriented to person, place, and time.  Skin: Warm and dry  Psychiatric: Normal mood and affect  Musculoskeletal:    Left lower extremity with short leg splint in place.  Toes are warm and well-perfused with brisk capillary refill.  No pain with passive stretch.  Sensation intact to light touch.  Compartment soft compressible proximally.  No signs of compartment syndrome.    Result Review    Result Review:    Radiographs and CT of the left lower extremity are reviewed.  These show a mildly displaced oblique fracture of the distal third tibial shaft.  Mildly comminuted proximal fibular fracture.  No extension into the tibial plafond.      Radiology:      CT LOWER EXTREMITY LEFT WO CONTRAST-     COMPARISON: None.     HISTORY: pre-op, tib/fib fracture; S82.232A-Displaced oblique fracture  of shaft of left tibia, initial encounter for closed fracture;  S82.452A-Displaced comminuted fracture of shaft of left fibula, initial  encounter for closed fracture     TECHNIQUE: Multidetector helical CT acquisition through the left leg is  provided without   IV contrast. Coronal and sagittal multiplanar  reformatted images are provided. Radiation dose reduction techniques  were utilized, including automated exposure control, and exposure  modulation based on body size.     FINDINGS: There is an oblique distal tibial shaft fracture with about 1  cm of override, and about 30 to 40% lateral and posterior displacement.     There is an oblique proximal fibular shaft fracture, moderately  displaced, about 8 cm below the proximal talofibular joint.     There is no direct involvement of the knee or ankle joint.      IMPRESSION:  Oblique proximal fibular and distal tibial fractures as  above, without articular involvement.    Assessment & Plan   Assessment / Plan     Brief Patient Summary:  Marco Antonio Kendrick is a 74 y.o. male with history of hypertension, hyperlipidemia, diabetes, sick sinus syndrome status post pacemaker placement, who presents with a closed displaced left tibial shaft fracture    Active Hospital Problems:  Active Hospital Problems    Diagnosis     **Closed displaced oblique fracture of shaft of left tibia     Displaced fracture of left fibula     Type 2 diabetes mellitus with hyperglycemia, without long-term current use of insulin     Hyperlipidemia     History of permanent cardiac pacemaker placement     Primary hypertension     Cancer of sigmoid colon      Plan:   I have recommended intramedullary nailing of his left tibia fracture.    He is resting comfortably without signs or symptoms of compartment syndrome.    The risks/benefits of surgery, including pain, infection, wound healing problems, need for future procedures, mal/nonunion, malrotation, compartment syndrome, DVT/PE, nerve or vessel injury, cardiac event, and/or death were discussed, and the patient elected to proceed with surgery.    -Please keep n.p.o. for surgery later today  - DVT: Hold chemoprophylaxis for now, HOLLIE/SCDs-   -Pain control, elevation  -Nonweightbearing, left lower extremity  -Disposition: Pending      Thank you for this consultation, please call with questions        Bi Bermudez Jr, MD

## 2025-02-25 NOTE — H&P
Patient Name:  Marco Antonio Kendrick  YOB: 1950  MRN:  [id number]  Admit Date:  2/24/2025  Patient Care Team:  Veronica Cooper MD as PCP - General (Internal Medicine)  Leonel Cage MD as Consulting Physician (Gastroenterology)  Leonel Cage MD as Referring Physician (Gastroenterology)  Kev Latham II, MD as Consulting Physician (Hematology and Oncology)      Subjective   History Present Illness     Chief Complaint   Patient presents with    Ankle Injury     History of Present Illness  Mr. Kendrick is a 74 year old male with history of colon cancer, SSS with pacemaker, hypertension, type 2 diabetes who presents to the emergency room after a fall.  He state states that he was trying to clean out his shed and got his foot stuck under the parked  and fell back and his foot remained stuck and got twisted.  He is not on any anticoagulation.  He has had colon surgery in the past with no trouble with anesthesia.  He has a pacemaker, no chest pain, syncope or dizziness.  He sees Dr. Weldon with cardiology.  In the emergency room his electrolytes were within normal except for mildly elevated glucose at 120, albumin 3.3.  Hemoglobin A1c 6.3, white blood cell count normal, hemoglobin 12.7, hematocrit 40.0.  X-ray of the left leg shows obliquely oriented distal tibial shaft fracture with a millimeter lateral displacement.  Also nondisplaced fracture component which extends into the distal tibial metaphysis.  There is an obliquely oriented fibular fracture with 5 mm lateral displacement.  Ankle joint appears intact.  Patient has splint with ace wrap on from thigh to toes from ER.    Review of Systems   Constitutional:  Negative for appetite change and fever.   HENT:  Negative for nosebleeds and trouble swallowing.    Eyes:  Negative for photophobia, redness and visual disturbance.   Respiratory:  Negative for cough, chest tightness, shortness of breath and wheezing.    Cardiovascular:   Negative for chest pain, palpitations and leg swelling.   Gastrointestinal:  Negative for abdominal distention, abdominal pain, nausea and vomiting.   Endocrine: Negative.    Genitourinary: Negative.    Musculoskeletal:  Positive for gait problem. Negative for joint swelling.        Fall with left leg pain   Skin: Negative.    Neurological:  Negative for dizziness, seizures, speech difficulty, light-headedness and headaches.   Hematological: Negative.    Psychiatric/Behavioral:  Negative for behavioral problems and confusion.         Personal History     Past Medical History:   Diagnosis Date    Arthritis     Bipolar disorder     Bladder wall thickening     MILD NON-SPECIFIC URINARY BLADDER....FOLLOWED BY DR COBURN    Bone marrow suppression     SIGNIFICANT MYELOSUPRESSION FROM FOLFOX-4 CHEMOTHERAPY    Cancer of sigmoid colon     STAGE III (T3N1M0)    Depression     Disease of thyroid gland     HYPOTHYROIDISM    Diverticula of colon     Enlarged prostate     H/O allergy to eggs     Headache     Hyperlipidemia     Hypertension     Hyperthyroidism     Obesity     Pneumonia 11/2015    PONV (postoperative nausea and vomiting)     Sick sinus syndrome     S/P LEFT CHEST PACEMAKER IMPLANTATION    Splenomegaly      Past Surgical History:   Procedure Laterality Date    APPENDECTOMY      COLON SURGERY  2011    RESECTION    COLONOSCOPY  01/2015    DR. CAGE    COLONOSCOPY N/A 7/25/2016    Procedure: COLONOSCOPY to cecum and ti;  Surgeon: Leonel Cage MD;  Location: [facility] ENDOSCOPY;  Service:     COLONOSCOPY N/A 6/16/2021    Procedure: COLONOSCOPY TO CECUM (SIGMOID RESECTION);  Surgeon: Leonel Cage MD;  Location: [facility] ENDOSCOPY;  Service: Gastroenterology;  Laterality: N/A;  pre:  personal hx of polyps and colon ca  post: DIVERTICULOSIS, HEMORRHOIDS    CYSTOSCOPY      BY DR COBURN    PACEMAKER IMPLANTATION Left      Family History   Problem Relation Age of Onset    Colon cancer Father     Melanoma Brother      Heart disease Other     Hypertension Other     Diabetes Other     Cancer Other     Stroke Other      Social History     Tobacco Use    Smoking status: Never     Passive exposure: Never    Smokeless tobacco: Never    Tobacco comments:     caffeine use   Vaping Use    Vaping status: Never Used   Substance Use Topics    Alcohol use: Yes     Comment: occasional    Drug use: No     No current facility-administered medications on file prior to encounter.     Current Outpatient Medications on File Prior to Encounter   Medication Sig Dispense Refill    amLODIPine (NORVASC) 5 MG tablet Take 1 tablet by mouth Daily. 90 tablet 01    aspirin 81 MG chewable tablet Chew 1 tablet Daily.      benazepril (LOTENSIN) 10 MG tablet TAKE 1 TABLET EVERY DAY 90 tablet 3    Cholecalciferol (VITAMIN D PO) Take  by mouth.      fluticasone (FLONASE) 50 MCG/ACT nasal spray 2 sprays into the nostril(s) as directed by provider Daily for 14 days. 16 g 0    ibuprofen (ADVIL,MOTRIN) 600 MG tablet Take 1 tablet by mouth Every 6 (Six) Hours As Needed for Moderate Pain . 30 tablet 0    levothyroxine (SYNTHROID, LEVOTHROID) 200 MCG tablet TAKE 1 TABLET EVERY DAY 90 tablet 3    Magnesium 200 MG tablet Take 1 tablet by mouth As Needed.      metFORMIN (GLUCOPHAGE) 500 MG tablet Take 2 tablets by mouth 2 (Two) Times a Day With Meals. 360 tablet 3    pioglitazone (ACTOS) 30 MG tablet Take 1 tablet by mouth Daily. 90 tablet 3    rosuvastatin (Crestor) 20 MG tablet Take 1 tablet by mouth Daily. 90 tablet 3    Semaglutide,0.25 or 0.5MG/DOS, (Ozempic, 0.25 or 0.5 MG/DOSE,) 2 MG/3ML solution pen-injector Inject 0.25 mg under the skin into the appropriate area as directed 1 (One) Time Per Week. 9 mL 0    sertraline (ZOLOFT) 100 MG tablet Take 1.5 tablets by mouth Daily. 135 tablet 3     Allergies   Allergen Reactions    Egg-Derived Products Nausea And Vomiting       Objective    Objective     Vital Signs  Temp:  [97.7 °F (36.5 °C)-98.2 °F (36.8 °C)] 97.7 °F  (36.5 °C)  Heart Rate:  [60-67] 67  Resp:  [18-20] 18  BP: (125-142)/(55-75) 142/75  SpO2:  [95 %-98 %] 97 %  on   ;   Device (Oxygen Therapy): room air  Body mass index is 35.81 kg/m².    Physical Exam  Vitals and nursing note reviewed.   Constitutional:       General: He is not in acute distress.     Appearance: He is well-developed.   HENT:      Head: Normocephalic.   Neck:      Vascular: No JVD.   Cardiovascular:      Rate and Rhythm: Normal rate and regular rhythm.      Heart sounds: Normal heart sounds.      Comments: Paced rhythm, HR 76  Pulmonary:      Effort: Pulmonary effort is normal.      Breath sounds: Normal breath sounds.      Comments: Lung sounds clear  Abdominal:      General: There is no distension.      Palpations: Abdomen is soft.      Tenderness: There is no abdominal tenderness.   Musculoskeletal:         General: Normal range of motion.      Cervical back: Normal range of motion.      Left lower leg: Swelling, tenderness and bony tenderness present.      Comments: Good cap refill in left foot with good pedal pulse felt   Skin:     General: Skin is warm and dry.      Capillary Refill: Capillary refill takes less than 2 seconds.   Neurological:      General: No focal deficit present.      Mental Status: He is alert and oriented to person, place, and time.   Psychiatric:         Attention and Perception: Attention normal.         Mood and Affect: Mood normal.         Behavior: Behavior normal.         Cognition and Memory: Cognition normal.         Results Review:  I reviewed the patient's new clinical results.  I reviewed the patient's new imaging results and agree with the interpretation.  I reviewed the patient's other test results and agree with the interpretation  I personally viewed and interpreted the patient's EKG/Telemetry data  Discussed with ED provider.    Lab Results (last 24 hours)       Procedure Component Value Units Date/Time    CBC & Differential [[id number]]  (Abnormal)  Collected: 02/24/25 1739    Specimen: Blood Updated: 02/24/25 1755    Narrative:      The following orders were created for panel order CBC & Differential.  Procedure                               Abnormality         Status                     ---------                               -----------         ------                     CBC Auto Differential[[id number]]        Abnormal            Final result                 Please view results for these tests on the individual orders.    Comprehensive Metabolic Panel [[id number]]  (Abnormal) Collected: 02/24/25 1739    Specimen: Blood Updated: 02/24/25 1826     Glucose 120 mg/dL      BUN 13 mg/dL      Creatinine 1.25 mg/dL      Sodium 141 mmol/L      Potassium 4.1 mmol/L      Chloride 105 mmol/L      CO2 25.3 mmol/L      Calcium 9.3 mg/dL      Total Protein 6.8 g/dL      Albumin 3.3 g/dL      ALT (SGPT) 13 U/L      AST (SGOT) 19 U/L      Alkaline Phosphatase 72 U/L      Total Bilirubin 0.4 mg/dL      Globulin 3.5 gm/dL      A/G Ratio 0.9 g/dL      BUN/Creatinine Ratio 10.4     Anion Gap 10.7 mmol/L      eGFR 59.7 mL/min/1.73     Narrative:      GFR Categories in Chronic Kidney Disease (CKD)      GFR Category          GFR (mL/min/1.73)    Interpretation  G1                     90 or greater         Normal or high (1)  G2                      60-89                Mild decrease (1)  G3a                   45-59                Mild to moderate decrease  G3b                   30-44                Moderate to severe decrease  G4                    15-29                Severe decrease  G5                    14 or less           Kidney failure          (1)In the absence of evidence of kidney disease, neither GFR category G1 or G2 fulfill the criteria for CKD.    eGFR calculation 2021 CKD-EPI creatinine equation, which does not include race as a factor    CBC Auto Differential [[id number]]  (Abnormal) Collected: 02/24/25 1739    Specimen: Blood Updated: 02/24/25 1755     WBC 5.93  10*3/mm3      RBC 4.32 10*6/mm3      Hemoglobin 12.7 g/dL      Hematocrit 40.0 %      MCV 92.6 fL      MCH 29.4 pg      MCHC 31.8 g/dL      RDW 13.1 %      RDW-SD 44.2 fl      MPV 10.7 fL      Platelets 186 10*3/mm3      Neutrophil % 69.6 %      Lymphocyte % 20.7 %      Monocyte % 6.4 %      Eosinophil % 1.7 %      Basophil % 1.3 %      Immature Grans % 0.3 %      Neutrophils, Absolute 4.12 10*3/mm3      Lymphocytes, Absolute 1.23 10*3/mm3      Monocytes, Absolute 0.38 10*3/mm3      Eosinophils, Absolute 0.10 10*3/mm3      Basophils, Absolute 0.08 10*3/mm3      Immature Grans, Absolute 0.02 10*3/mm3      nRBC 0.0 /100 WBC     Hemoglobin A1c [[id number]]  (Abnormal) Collected: 02/24/25 1739    Specimen: Blood Updated: 02/1950     Hemoglobin A1C 6.30 %     Narrative:      Hemoglobin A1C Ranges:    Increased Risk for Diabetes  5.7% to 6.4%  Diabetes                     >= 6.5%  Diabetic Goal                < 7.0%    POC Glucose Once [[id number]]  (Normal) Collected: 02/24/25 2238    Specimen: Blood Updated: 02/24/25 2239     Glucose 119 mg/dL             Imaging Results (Last 24 Hours)       Procedure Component Value Units Date/Time    CT Lower Extremity Left Without Contrast [[id number]] Collected: 02/24/25 2024     Updated: 02/24/25 2031    Narrative:         CT LOWER EXTREMITY LEFT WO CONTRAST-     COMPARISON: None.     HISTORY: pre-op, tib/fib fracture; S82.232A-Displaced oblique fracture  of shaft of left tibia, initial encounter for closed fracture;  S82.452A-Displaced comminuted fracture of shaft of left fibula, initial  encounter for closed fracture     TECHNIQUE: Multidetector helical CT acquisition through the left leg is  provided without   IV contrast. Coronal and sagittal multiplanar  reformatted images are provided. Radiation dose reduction techniques  were utilized, including automated exposure control, and exposure  modulation based on body size.     FINDINGS: There is an oblique distal tibial shaft  fracture with about 1  cm of override, and about 30 to 40% lateral and posterior displacement.     There is an oblique proximal fibular shaft fracture, moderately  displaced, about 8 cm below the proximal talofibular joint.     There is no direct involvement of the knee or ankle joint.       Impression:      Oblique proximal fibular and distal tibial fractures as  above, without articular involvement.        This report was finalized on 2/24/2025 8:27 PM by Dr. Grant Mauro M.D on Workstation: [id number]       XR Ankle 3+ View Left [[id number]] Collected: 02/24/25 1823     Updated: 02/24/25 1823    Narrative:      XR ANKLE 3+ VW LEFT-, XR TIBIA FIBULA 2 VW LEFT-     HISTORY: 74-year-old male status post a fall.     FINDINGS:  There is an obliquely oriented distal tibial shaft fracture with 8 mm  lateral displacement. There is also a nondisplaced fracture component  which extends into the distal tibial metaphysis. There is a obliquely  oriented fibular fracture with 5 mm lateral displacement. Ankle joint  appears intact.       XR Tibia Fibula 2 View Left [[id number]] Collected: 02/24/25 1823     Updated: 02/24/25 1823    Narrative:      XR ANKLE 3+ VW LEFT-, XR TIBIA FIBULA 2 VW LEFT-     HISTORY: 74-year-old male status post a fall.     FINDINGS:  There is an obliquely oriented distal tibial shaft fracture with 8 mm  lateral displacement. There is also a nondisplaced fracture component  which extends into the distal tibial metaphysis. There is a obliquely  oriented fibular fracture with 5 mm lateral displacement. Ankle joint  appears intact.                   ECG 12 Lead Pre-Op / Pre-Procedure    (Results Pending)        Assessment/Plan     Active Hospital Problems    Diagnosis  POA    **Closed displaced oblique fracture of shaft of left tibia [S82.232A]  Yes    Displaced fracture of left fibula [S82.402A]  Yes    Type 2 diabetes mellitus with hyperglycemia, without long-term current use of insulin [E11.65]   Yes    Hyperlipidemia [E78.5]  Yes    History of permanent cardiac pacemaker placement [Z95.0]  Yes    Primary hypertension [I10]  Yes    Cancer of sigmoid colon [C18.7]  Yes     Mr. Kendrick is a 74 year old male with history of colon cancer, SSS with pacemaker, hypertension, type 2 diabetes who presents to the emergency room after a fall.     Close fracture left tibia/fibula  -Consult orthopedic surgery  -Pain control overnight   -neurovascular checks every 4 hours  -N.p.o. after midnight  -PT to eval and treat per ortho ok after surgery  -CCP to see, may need to have placement for some rehab post op    Type 2 diabetes  -Accu-Cheks before meals and at bedtime with correctional dose insulin  -Hold oral diabetic medications at this time  -Hemoglobin A1c 6.3    Hyperlipidemia/hypertension/pacemaker  -Chronic conditions are stable  -Continue home medications when med rec completed  -Obtain preop EKG    I discussed the patient's findings and my recommendations with patient, family, and nursing staff.    VTE Prophylaxis - SCDs.  Code Status - Full code.       SADAF Bello  [facility]  02/24/25  22:53 EST

## 2025-02-25 NOTE — ANESTHESIA PREPROCEDURE EVALUATION
Anesthesia Evaluation     Patient summary reviewed and Nursing notes reviewed   NPO Solid Status: > 8 hours  NPO Liquid Status: > 2 hours           Airway   Mallampati: II  TM distance: >3 FB  Neck ROM: full  No difficulty expected  Dental - normal exam   (+) poor dentition        Pulmonary - normal exam   Cardiovascular - normal exam  Exercise tolerance: good (4-7 METS)    ECG reviewed  Patient on routine beta blocker and Beta blocker given within 24 hours of surgery    (+) pacemaker pacemaker, hypertension, hyperlipidemia      Neuro/Psych  (+) headaches, psychiatric history Bipolar  GI/Hepatic/Renal/Endo    (+) morbid obesity, diabetes mellitus type 2, thyroid problem hypothyroidism    Musculoskeletal     Abdominal    Substance History - negative use     OB/GYN          Other   arthritis,   history of cancer                Anesthesia Plan    ASA 3     general     inhalational induction     Anesthetic plan, risks, benefits, and alternatives have been provided, discussed and informed consent has been obtained with: patient.    CODE STATUS:    Code Status (Patient has no pulse and is not breathing): CPR (Attempt to Resuscitate)  Medical Interventions (Patient has pulse or is breathing): Full

## 2025-02-25 NOTE — ED NOTES
Nursing report ED to floor  Marco Antonio Kendrick  74 y.o.  male    Marco Antonio Kendrick is a 74 y.o. male with a medical history of colon cancer, sick sinus syndrome status post pacemaker, hypertension, diabetes who presents to the ED c/o acute ankle pain.  Patient was in his shed when his foot got hung up on his lawnmower causing him to fall.  He states that he fell opposite direction of his foot.  He felt a pop in his left ankle.  He said pain on the medial aspect of the left ankle as well as the distal calf since that time.  No head injury or any other complaint.  Patient does not take any blood thinners.  Patient does not use any ambulatory assistance devices at baseline.   HPI :  HPI  Stated Reason for Visit: ankle injury    Chief Complaint  Chief Complaint   Patient presents with    Ankle Injury       Admitting doctor:   Ronnie Griffith MD    Admitting diagnosis:   The primary encounter diagnosis was Closed displaced oblique fracture of shaft of left tibia, initial encounter. A diagnosis of Displaced comminuted fracture of shaft of left fibula, initial encounter for closed fracture was also pertinent to this visit.    Code status:   Current Code Status       Date Active Code Status Order ID Comments User Context       2/24/2025 1921 CPR (Attempt to Resuscitate) [id number]  Meaghan Salas, APRN ED        Question Answer    Code Status (Patient has no pulse and is not breathing) CPR (Attempt to Resuscitate)    Medical Interventions (Patient has pulse or is breathing) Full                    Allergies:   Egg-derived products    Isolation:   No active isolations    Intake and Output  No intake or output data in the 24 hours ending 02/24/25 2054    Weight:   There were no vitals filed for this visit.    Most recent vitals:   Vitals:    02/24/25 1717 02/24/25 1742 02/24/25 1843 02/24/25 1939   BP: 127/55 125/70 128/69 142/74   BP Location:    Right arm   Patient Position: Lying   Lying   Pulse: 63 61 62 60   Resp: 19 20   18   Temp: 98.2 °F (36.8 °C)      TempSrc: Tympanic      SpO2: 96% 98% 96% 95%       Active LDAs/IV Access:   Lines, Drains & Airways       Active LDAs       Name Placement date Placement time Site Days    Peripheral IV 02/24/25 1738 Left Antecubital 02/24/25 1738  Antecubital  less than 1                    Labs (abnormal labs have a star):   Labs Reviewed   COMPREHENSIVE METABOLIC PANEL - Abnormal; Notable for the following components:       Result Value    Glucose 120 (*)     Albumin 3.3 (*)     eGFR 59.7 (*)     All other components within normal limits    Narrative:     GFR Categories in Chronic Kidney Disease (CKD)      GFR Category          GFR (mL/min/1.73)    Interpretation  G1                     90 or greater         Normal or high (1)  G2                      60-89                Mild decrease (1)  G3a                   45-59                Mild to moderate decrease  G3b                   30-44                Moderate to severe decrease  G4                    15-29                Severe decrease  G5                    14 or less           Kidney failure          (1)In the absence of evidence of kidney disease, neither GFR category G1 or G2 fulfill the criteria for CKD.    eGFR calculation 2021 CKD-EPI creatinine equation, which does not include race as a factor   CBC WITH AUTO DIFFERENTIAL - Abnormal; Notable for the following components:    Hemoglobin 12.7 (*)     All other components within normal limits   HEMOGLOBIN A1C - Abnormal; Notable for the following components:    Hemoglobin A1C 6.30 (*)     All other components within normal limits    Narrative:     Hemoglobin A1C Ranges:    Increased Risk for Diabetes  5.7% to 6.4%  Diabetes                     >= 6.5%  Diabetic Goal                < 7.0%   POCT GLUCOSE FINGERSTICK   POCT GLUCOSE FINGERSTICK   POCT GLUCOSE FINGERSTICK   POCT GLUCOSE FINGERSTICK   CBC AND DIFFERENTIAL    Narrative:     The following orders were created for panel order CBC &  Differential.  Procedure                               Abnormality         Status                     ---------                               -----------         ------                     CBC Auto Differential[[id number]]        Abnormal            Final result                 Please view results for these tests on the individual orders.       EKG:   No orders to display       Meds given in ED:   Medications   acetaminophen (TYLENOL) tablet 650 mg (has no administration in time range)   sennosides-docusate (PERICOLACE) 8.6-50 MG per tablet 2 tablet (has no administration in time range)     And   polyethylene glycol (MIRALAX) packet 17 g (has no administration in time range)     And   bisacodyl (DULCOLAX) EC tablet 5 mg (has no administration in time range)     And   bisacodyl (DULCOLAX) suppository 10 mg (has no administration in time range)   ondansetron ODT (ZOFRAN-ODT) disintegrating tablet 4 mg (has no administration in time range)     Or   ondansetron (ZOFRAN) injection 4 mg (has no administration in time range)   dextrose (GLUTOSE) oral gel 15 g (has no administration in time range)   dextrose (D50W) (25 g/50 mL) IV injection 25 g (has no administration in time range)   glucagon (GLUCAGEN) injection 1 mg (has no administration in time range)   insulin lispro (HUMALOG/ADMELOG) injection 2-7 Units (has no administration in time range)   morphine injection 2 mg (has no administration in time range)   morphine injection 4 mg (4 mg Intravenous Given 2/24/25 1741)   ondansetron (ZOFRAN) injection 4 mg (4 mg Intravenous Given 2/24/25 1740)   morphine injection 4 mg (4 mg Intravenous Given 2/24/25 1842)       Imaging results:  CT Lower Extremity Left Without Contrast    Result Date: 2/24/2025  Oblique proximal fibular and distal tibial fractures as above, without articular involvement.   This report was finalized on 2/24/2025 8:27 PM by Dr. Grant Mauro M.D on Workstation: [id number]       Ambulatory status:   -  bedrest    Social issues:   Social History     Socioeconomic History    Marital status:      Spouse name: Carmelita    Number of children: 2   Tobacco Use    Smoking status: Never     Passive exposure: Never    Smokeless tobacco: Never    Tobacco comments:     caffeine use   Vaping Use    Vaping status: Never Used   Substance and Sexual Activity    Alcohol use: Yes     Comment: occasional    Drug use: No    Sexual activity: Defer       Peripheral Neurovascular  Peripheral Neurovascular (Adult)  Peripheral Neurovascular WDL: WDL  LLE Neurovascular Assessment  Temperature LLE: warm  Color LLE: no discoloration  Sensation LLE: tenderness present    Neuro Cognitive  Neuro Cognitive (Adult)  Cognitive/Neuro/Behavioral WDL: WDL    Learning  Learning Assessment  Learning Readiness and Ability: no barriers identified  Education Provided  Person Taught: patient  Teaching Method: verbal instruction  Teaching Focus: symptom/problem overview  Education Outcome Evaluation: verbalizes understanding    Respiratory  Respiratory WDL  Respiratory WDL: WDL    Abdominal Pain       Pain Assessments  Pain (Adult)  (0-10) Pain Rating: Rest: 4  (0-10) Pain Rating: Activity: 9  Pain Location: ankle  Pain Side/Orientation: left    NIH Stroke Scale       Amrita Beaver RN  02/24/25 20:54 EST

## 2025-02-25 NOTE — ED PROVIDER NOTES
Splint - Cast - Strapping    Date/Time: 2/24/2025 8:33 PM    Performed by: Alex Boston PA  Authorized by: Yadiel Croft MD    Consent:     Consent obtained:  Verbal    Consent given by:  Patient  Universal protocol:     Patient identity confirmed:  Verbally with patient  Pre-procedure details:     Distal neurologic exam:  Normal    Distal perfusion: distal pulses strong    Procedure details:     Location:  Leg    Leg location:  L lower leg    Splint type:  Long leg    Supplies used: Ortho-Glass.    Attestation: Splint applied and adjusted personally by me    Post-procedure details:     Distal neurologic exam:  Normal    Distal perfusion: distal pulses strong      Procedure completion:  Tolerated well, no immediate complications         Alex Boston PA  02/24/25 2034

## 2025-02-25 NOTE — ANESTHESIA PROCEDURE NOTES
Airway  Urgency: elective    Date/Time: 2/25/2025 3:43 PM  Difficult airway    General Information and Staff    Patient location during procedure: OR  Anesthesiologist: Oswaldo Santacruz MD    Indications and Patient Condition  Indications for airway management: airway protection    Preoxygenated: yes  Mask difficulty assessment: 1 - vent by mask    Final Airway Details  Final airway type: endotracheal airway      Successful airway: ETT  Cuffed: yes   Successful intubation technique: direct laryngoscopy  Facilitating devices/methods: intubating stylet  Endotracheal tube insertion site: oral  Blade: Bryan  Blade size: 3  ETT size (mm): 7.5  Cormack-Lehane Classification: grade IIb - view of arytenoids or posterior of glottis only  Placement verified by: chest auscultation and capnometry   Measured from: teeth  ETT/EBT  to teeth (cm): 23  Number of attempts at approach: 1  Assessment: lips, teeth, and gum same as pre-op and atraumatic intubation    Additional Comments  Very anterior airway

## 2025-02-25 NOTE — PROGRESS NOTES
Discharge Planning Assessment  [facility]     Patient Name: Marco Antonio Kendrick  MRN: [id number]  Today's Date: 2/25/2025    Admit Date: 2/24/2025    Plan: pending   Discharge Needs Assessment    No documentation.                  Discharge Plan       Row Name 02/25/25 1631       Plan    Plan pending    Plan Comments The patient transferred to [facility] from ER on 2/24/25 @ 22:20. The patient is gone to surgery. CCP will follow and screen the patient  for any needs. MI Warren RN, CCP.                  Continued Care and Services - Admitted Since 2/24/2025    No active coordination exists for this encounter.       Selected Continued Care - Episodes Includes continued care and service providers with selected services from the active episodes listed below      High Risk Care Management Episode start date: 2/25/2025 (Paused)   There are no active outsourced providers for this episode.                 Expected Discharge Date and Time       Expected Discharge Date Expected Discharge Time    Feb 28, 2025            Demographic Summary    No documentation.                  Functional Status    No documentation.                  Psychosocial    No documentation.                  Abuse/Neglect    No documentation.                  Legal    No documentation.                  Substance Abuse    No documentation.                  Patient Forms    No documentation.                     Isi Warren RN

## 2025-02-25 NOTE — ANESTHESIA POSTPROCEDURE EVALUATION
Patient: Marco Antonio Kendrick    Procedure Summary       Date: 02/25/25 Room / Location:  [facility]  /  [facility]    Anesthesia Start: 1530 Anesthesia Stop: 1725    Procedure: Intramedullary nailing, left tibia fracture (Left: Leg Lower) Diagnosis:       Closed displaced oblique fracture of shaft of left tibia, initial encounter      (Closed displaced oblique fracture of shaft of left tibia, initial encounter [S82.232A])    Surgeons: Bi Bermudez Jr., MD Provider: Oswaldo Santacruz MD    Anesthesia Type: general ASA Status: 3            Anesthesia Type: general    Vitals  Vitals Value Taken Time   /65 02/25/25 1830   Temp 36.9 °C (98.4 °F) 02/25/25 1722   Pulse 60 02/25/25 1839   Resp 16 02/25/25 1800   SpO2 100 % 02/25/25 1839   Vitals shown include unfiled device data.        Post Anesthesia Care and Evaluation    Patient location: did not personally evaluate patient.    Comments: Discharge criteria met per RN

## 2025-02-25 NOTE — PROGRESS NOTES
" LOS: 1 day     Name: Marco Antonio Kendrick  Age: 74 y.o.  Sex: male  :  1950  MRN: [id number]         Primary Care Physician: Veronica Cooper MD    Subjective   Subjective  No new complaints or acute overnight events.  States his pain in the left leg is reasonably controlled.  Denies any chest pain or shortness of breath.    Objective   Vital Signs  Temp:  [97 °F (36.1 °C)-98.2 °F (36.8 °C)] 97 °F (36.1 °C)  Heart Rate:  [58-74] 74  Resp:  [18-20] 18  BP: (114-142)/(55-80) 127/80  Body mass index is 34.81 kg/m².    Objective:  General Appearance:  Comfortable and in no acute distress.    Vital signs: (most recent): Blood pressure 127/80, pulse 74, temperature 97 °F (36.1 °C), temperature source Oral, resp. rate 18, height 180.3 cm (71\"), weight 113 kg (249 lb 9 oz), SpO2 97%.    Lungs:  Normal effort and normal respiratory rate.  He is not in respiratory distress.  No decreased breath sounds.    Heart: Normal rate.  Regular rhythm.    Abdomen: Abdomen is soft.  Bowel sounds are normal.   There is no abdominal tenderness.     Extremities: There is no dependent edema or local swelling.  (Left lower leg is splinted and wrapped)  Neurological: Patient is alert and oriented to person, place and time.    Skin:  Warm and dry.                Results Review:       I reviewed the patient's new clinical results.    Results from last 7 days   Lab Units 25  0641 25  1739   WBC 10*3/mm3 6.51 5.93   HEMOGLOBIN g/dL 11.4* 12.7*   PLATELETS 10*3/mm3 142 186     Results from last 7 days   Lab Units 25  0641 25  1739   SODIUM mmol/L 142 141   POTASSIUM mmol/L 4.1 4.1   CHLORIDE mmol/L 106 105   CO2 mmol/L 27.0 25.3   BUN mg/dL 14 13   CREATININE mg/dL 1.14 1.25   CALCIUM mg/dL 8.7 9.3   GLUCOSE mg/dL 132* 120*                 Scheduled Meds:   [Held by provider] amLODIPine, 5 mg, Oral, Daily  insulin lispro, 2-7 Units, Subcutaneous, 4x Daily AC & at Bedtime  levothyroxine, 200 mcg, Oral, " Daily  lisinopril, 10 mg, Oral, Q24H  senna-docusate sodium, 2 tablet, Oral, BID  sertraline, 150 mg, Oral, Daily      PRN Meds:     acetaminophen    senna-docusate sodium **AND** polyethylene glycol **AND** bisacodyl **AND** bisacodyl    dextrose    dextrose    glucagon (human recombinant)    HYDROcodone-acetaminophen    ondansetron ODT **OR** ondansetron  Continuous Infusions:       Assessment & Plan   Active Hospital Problems    Diagnosis  POA    **Closed displaced oblique fracture of shaft of left tibia [S82.232A]  Yes    Displaced fracture of left fibula [S82.402A]  Yes    Type 2 diabetes mellitus with hyperglycemia, without long-term current use of insulin [E11.65]  Yes    Hyperlipidemia [E78.5]  Yes    History of permanent cardiac pacemaker placement [Z95.0]  Yes    Primary hypertension [I10]  Yes    Cancer of sigmoid colon [C18.7]  Yes      Resolved Hospital Problems   No resolved problems to display.       Assessment & Plan    -Orthopedic surgery is evaluated and he is going for IM nailing of left tibia fracture later today  -Blood pressure well-controlled.  I am going to place the Norvasc on hold.  Continue lisinopril with holding parameters in place  -Continue Synthroid  -He reports reasonable pain control today    Dispo  Anticipate eventual discharge home    Expected discharge date/ time has not been documented.     Baldo Agrawal MD  [facility]  02/25/25  10:18 EST

## 2025-02-26 ENCOUNTER — APPOINTMENT (OUTPATIENT)
Dept: GENERAL RADIOLOGY | Facility: HOSPITAL | Age: 75
End: 2025-02-26
Payer: MEDICARE

## 2025-02-26 LAB
ANION GAP SERPL CALCULATED.3IONS-SCNC: 8.7 MMOL/L (ref 5–15)
BUN SERPL-MCNC: 17 MG/DL (ref 8–23)
BUN/CREAT SERPL: 15.6 (ref 7–25)
CALCIUM SPEC-SCNC: 8.7 MG/DL (ref 8.6–10.5)
CHLORIDE SERPL-SCNC: 103 MMOL/L (ref 98–107)
CO2 SERPL-SCNC: 24.3 MMOL/L (ref 22–29)
CREAT SERPL-MCNC: 1.09 MG/DL (ref 0.76–1.27)
DEPRECATED RDW RBC AUTO: 43.2 FL (ref 37–54)
EGFRCR SERPLBLD CKD-EPI 2021: 70.3 ML/MIN/1.73
ERYTHROCYTE [DISTWIDTH] IN BLOOD BY AUTOMATED COUNT: 12.7 % (ref 12.3–15.4)
GLUCOSE BLDC GLUCOMTR-MCNC: 143 MG/DL (ref 70–130)
GLUCOSE BLDC GLUCOMTR-MCNC: 150 MG/DL (ref 70–130)
GLUCOSE BLDC GLUCOMTR-MCNC: 158 MG/DL (ref 70–130)
GLUCOSE BLDC GLUCOMTR-MCNC: 172 MG/DL (ref 70–130)
GLUCOSE SERPL-MCNC: 185 MG/DL (ref 65–99)
HCT VFR BLD AUTO: 32.4 % (ref 37.5–51)
HGB BLD-MCNC: 10.2 G/DL (ref 13–17.7)
MCH RBC QN AUTO: 29.2 PG (ref 26.6–33)
MCHC RBC AUTO-ENTMCNC: 31.5 G/DL (ref 31.5–35.7)
MCV RBC AUTO: 92.8 FL (ref 79–97)
PLATELET # BLD AUTO: 142 10*3/MM3 (ref 140–450)
PMV BLD AUTO: 11.1 FL (ref 6–12)
POTASSIUM SERPL-SCNC: 4.3 MMOL/L (ref 3.5–5.2)
QT INTERVAL: 463 MS
QTC INTERVAL: 474 MS
RBC # BLD AUTO: 3.49 10*6/MM3 (ref 4.14–5.8)
SODIUM SERPL-SCNC: 136 MMOL/L (ref 136–145)
WBC NRBC COR # BLD AUTO: 8.77 10*3/MM3 (ref 3.4–10.8)

## 2025-02-26 PROCEDURE — 73590 X-RAY EXAM OF LOWER LEG: CPT

## 2025-02-26 PROCEDURE — 25010000002 CEFAZOLIN PER 500 MG: Performed by: ORTHOPAEDIC SURGERY

## 2025-02-26 PROCEDURE — 85027 COMPLETE CBC AUTOMATED: CPT | Performed by: ORTHOPAEDIC SURGERY

## 2025-02-26 PROCEDURE — 97530 THERAPEUTIC ACTIVITIES: CPT

## 2025-02-26 PROCEDURE — 82948 REAGENT STRIP/BLOOD GLUCOSE: CPT

## 2025-02-26 PROCEDURE — 80048 BASIC METABOLIC PNL TOTAL CA: CPT | Performed by: ORTHOPAEDIC SURGERY

## 2025-02-26 PROCEDURE — 25010000002 ENOXAPARIN PER 10 MG: Performed by: ORTHOPAEDIC SURGERY

## 2025-02-26 PROCEDURE — 63710000001 INSULIN LISPRO (HUMAN) PER 5 UNITS: Performed by: ORTHOPAEDIC SURGERY

## 2025-02-26 PROCEDURE — 97162 PT EVAL MOD COMPLEX 30 MIN: CPT

## 2025-02-26 RX ADMIN — INSULIN LISPRO 2 UNITS: 100 INJECTION, SOLUTION INTRAVENOUS; SUBCUTANEOUS at 08:00

## 2025-02-26 RX ADMIN — LISINOPRIL 10 MG: 10 TABLET ORAL at 08:00

## 2025-02-26 RX ADMIN — ENOXAPARIN SODIUM 40 MG: 100 INJECTION SUBCUTANEOUS at 15:19

## 2025-02-26 RX ADMIN — CEFAZOLIN 2000 MG: 2 INJECTION, POWDER, FOR SOLUTION INTRAMUSCULAR; INTRAVENOUS at 07:58

## 2025-02-26 RX ADMIN — CEFAZOLIN 2000 MG: 2 INJECTION, POWDER, FOR SOLUTION INTRAMUSCULAR; INTRAVENOUS at 15:18

## 2025-02-26 RX ADMIN — HYDROCODONE BITARTRATE AND ACETAMINOPHEN 1 TABLET: 7.5; 325 TABLET ORAL at 10:03

## 2025-02-26 RX ADMIN — HYDROCODONE BITARTRATE AND ACETAMINOPHEN 2 TABLET: 7.5; 325 TABLET ORAL at 22:42

## 2025-02-26 RX ADMIN — LEVOTHYROXINE SODIUM 200 MCG: 100 TABLET ORAL at 08:00

## 2025-02-26 RX ADMIN — HYDROCODONE BITARTRATE AND ACETAMINOPHEN 2 TABLET: 7.5; 325 TABLET ORAL at 17:15

## 2025-02-26 RX ADMIN — SENNOSIDES AND DOCUSATE SODIUM 2 TABLET: 50; 8.6 TABLET ORAL at 20:00

## 2025-02-26 RX ADMIN — SERTRALINE HYDROCHLORIDE 150 MG: 100 TABLET, FILM COATED ORAL at 08:00

## 2025-02-26 RX ADMIN — SENNOSIDES AND DOCUSATE SODIUM 2 TABLET: 50; 8.6 TABLET ORAL at 08:00

## 2025-02-26 NOTE — NURSING NOTE
Have been unable to locate pts belongings that he had in his room prior to leaving for sx. He was previously on [facility], they do not have any of his belongings there and were not told anything by [facility] about where they might be. Spoke with pt's wife to see if she took them home and she stated that someone on [facility] told her that his belongings were already sent up to [facility] where his new room was going to be. The pt previously in 781 discharged at 1733 and the room was not cleaned until after 8pm. We have no belongings that were turned in by housekeeping or left at the nurses station.     Checking with [facility], security, [facility] and PACU. If still unable to locate, will fill out safe report.

## 2025-02-26 NOTE — PLAN OF CARE
Goal Outcome Evaluation:  Plan of Care Reviewed With: patient        Progress: improving  Outcome Evaluation: Pt remains stable. Is to be NWB to LLE, PT eval ordered. Splint in place. Minimal pain at this time. Voiding per urinal. Remains on 3L NC. Monitoring BG with diabetes hx. DC plan pending progress.

## 2025-02-26 NOTE — PROGRESS NOTES
"Orthopaedic Surgery  Daily Progress Note    /58 (BP Location: Right arm, Patient Position: Lying)   Pulse 57   Temp 97.8 °F (36.6 °C) (Oral)   Resp 16   Ht 180.3 cm (71\")   Wt 113 kg (249 lb 9 oz)   SpO2 99%   BMI 34.81 kg/m²     Lab Results (last 24 hours)       Procedure Component Value Units Date/Time    Basic Metabolic Panel [[id number]]  (Abnormal) Collected: 02/26/25 0520    Specimen: Blood Updated: 02/26/25 0623     Glucose 185 mg/dL      BUN 17 mg/dL      Creatinine 1.09 mg/dL      Sodium 136 mmol/L      Potassium 4.3 mmol/L      Chloride 103 mmol/L      CO2 24.3 mmol/L      Calcium 8.7 mg/dL      BUN/Creatinine Ratio 15.6     Anion Gap 8.7 mmol/L      eGFR 70.3 mL/min/1.73     Narrative:      GFR Categories in Chronic Kidney Disease (CKD)      GFR Category          GFR (mL/min/1.73)    Interpretation  G1                     90 or greater         Normal or high (1)  G2                      60-89                Mild decrease (1)  G3a                   45-59                Mild to moderate decrease  G3b                   30-44                Moderate to severe decrease  G4                    15-29                Severe decrease  G5                    14 or less           Kidney failure          (1)In the absence of evidence of kidney disease, neither GFR category G1 or G2 fulfill the criteria for CKD.    eGFR calculation 2021 CKD-EPI creatinine equation, which does not include race as a factor    CBC (No Diff) [[id number]]  (Abnormal) Collected: 02/26/25 0520    Specimen: Blood Updated: 02/26/25 0557     WBC 8.77 10*3/mm3      RBC 3.49 10*6/mm3      Hemoglobin 10.2 g/dL      Hematocrit 32.4 %      MCV 92.8 fL      MCH 29.2 pg      MCHC 31.5 g/dL      RDW 12.7 %      RDW-SD 43.2 fl      MPV 11.1 fL      Platelets 142 10*3/mm3     POC Glucose Once [[id number]]  (Abnormal) Collected: 02/25/25 2226    Specimen: Blood Updated: 02/25/25 2227     Glucose 169 mg/dL     POC Glucose Once [[id number]]  " (Abnormal) Collected: 02/25/25 1759    Specimen: Blood Updated: 02/25/25 1800     Glucose 149 mg/dL     POC Glucose Once [[id number]]  (Normal) Collected: 02/25/25 1141    Specimen: Blood Updated: 02/25/25 1143     Glucose 117 mg/dL     POC Glucose Once [[id number]]  (Normal) Collected: 02/25/25 0740    Specimen: Blood Updated: 02/25/25 0745     Glucose 129 mg/dL     Basic Metabolic Panel [[id number]]  (Abnormal) Collected: 02/25/25 0641    Specimen: Blood Updated: 02/25/25 0742     Glucose 132 mg/dL      BUN 14 mg/dL      Creatinine 1.14 mg/dL      Sodium 142 mmol/L      Potassium 4.1 mmol/L      Chloride 106 mmol/L      CO2 27.0 mmol/L      Calcium 8.7 mg/dL      BUN/Creatinine Ratio 12.3     Anion Gap 9.0 mmol/L      eGFR 66.7 mL/min/1.73     Narrative:      GFR Categories in Chronic Kidney Disease (CKD)      GFR Category          GFR (mL/min/1.73)    Interpretation  G1                     90 or greater         Normal or high (1)  G2                      60-89                Mild decrease (1)  G3a                   45-59                Mild to moderate decrease  G3b                   30-44                Moderate to severe decrease  G4                    15-29                Severe decrease  G5                    14 or less           Kidney failure          (1)In the absence of evidence of kidney disease, neither GFR category G1 or G2 fulfill the criteria for CKD.    eGFR calculation 2021 CKD-EPI creatinine equation, which does not include race as a factor    CBC (No Diff) [[id number]]  (Abnormal) Collected: 02/25/25 0641    Specimen: Blood Updated: 02/25/25 0725     WBC 6.51 10*3/mm3      RBC 3.72 10*6/mm3      Hemoglobin 11.4 g/dL      Hematocrit 34.3 %      MCV 92.2 fL      MCH 30.6 pg      MCHC 33.2 g/dL      RDW 12.9 %      RDW-SD 43.2 fl      MPV 11.0 fL      Platelets 142 10*3/mm3             Imaging Results (Last 24 Hours)       Procedure Component Value Units Date/Time    FL C Arm During Surgery [[id number]]  Resulted: 02/25/25 1714     Updated: 02/25/25 1714    Narrative:      This procedure was auto-finalized with no dictation required.    XR Ankle 3+ View Left [[id number]] Collected: 02/24/25 1823     Updated: 02/25/25 0754    Narrative:      XR ANKLE 3+ VW LEFT-, XR TIBIA FIBULA 2 VW LEFT-     HISTORY: 74-year-old male status post a fall.     FINDINGS:  There is an obliquely oriented distal tibial shaft fracture with 8 mm  lateral displacement. There is also a nondisplaced fracture component  which extends into the distal tibial metaphysis. There is a obliquely  oriented fibular fracture with 5 mm lateral displacement. Ankle joint  appears intact. There may be an old healed lateral malleolus fracture.     This report was finalized on 2/25/2025 7:51 AM by Dr. Tia Chen M.D on  Workstation: [id number]       XR Tibia Fibula 2 View Left [[id number]] Collected: 02/24/25 1823     Updated: 02/25/25 0754    Narrative:      XR ANKLE 3+ VW LEFT-, XR TIBIA FIBULA 2 VW LEFT-     HISTORY: 74-year-old male status post a fall.     FINDINGS:  There is an obliquely oriented distal tibial shaft fracture with 8 mm  lateral displacement. There is also a nondisplaced fracture component  which extends into the distal tibial metaphysis. There is a obliquely  oriented fibular fracture with 5 mm lateral displacement. Ankle joint  appears intact. There may be an old healed lateral malleolus fracture.     This report was finalized on 2/25/2025 7:51 AM by Dr. Tia Chen M.D on  Workstation: [id number]               Patient Care Team:  Veronica Cooper MD as PCP - General (Internal Medicine)  Leoenl Cage MD as Consulting Physician (Gastroenterology)  Leonel Cage MD as Referring Physician (Gastroenterology)  Kev Latham II, MD as Consulting Physician (Hematology and Oncology)  Kristy Sanabria RN as Ambulatory  ([facility])    SUBJECTIVE  Pain controlled    PHYSICAL EXAM  Patient sleeping  comfortably upon my entry into the room.  He is in no significant pain.  He reports his pain is well-controlled.  Splint clean, dry, intact  Toes warm, perfused, brisk capillary refill  Flexes/extends toes   SILT over toes  No pain with passive stretch  Compartments soft and compressible around the splint.           Closed displaced oblique fracture of shaft of left tibia    Cancer of sigmoid colon    Primary hypertension    History of permanent cardiac pacemaker placement    Hyperlipidemia    Displaced fracture of left fibula    Type 2 diabetes mellitus with hyperglycemia, without long-term current use of insulin      PLAN / DISPOSITION:  POD 1 s/p IM nail left tibia, doing well    His pain is well-controlled.  No sign or symptom of compartment syndrome.      1. Pain control: Orals  2.  Antibiotics: Periop Ancef to end today  3.  PT: NWB in splint  4. DVT: Lovenox 40 mg daily plus HOLLIE/SCDs, mobilization  5. Dispo: from my perspective, patient can be discharged once clears PT, may require rehab placement,follow up 2-3 weeks with me at [facility] ([phone] to make appointment)        Bi Bermudez Jr, MD  02/26/25  07:03 EST

## 2025-02-26 NOTE — CASE MANAGEMENT/SOCIAL WORK
Continued Stay Note  [facility]     Patient Name: Marco Antonio Kendrick  MRN: [id number]  Today's Date: 2/26/2025    Admit Date: 2/24/2025    Plan: Skilled bed at [facility] available tomorrow; [facility] WC van at 12:30 P.M 2/27   Discharge Plan       Row Name 02/26/25 1440       Plan    Plan Skilled bed at [facility] available tomorrow; [facility] WC van at 12:30 P.M 2/27    Plan Comments CCP received message from [facility]; states they are unable to accept. [facility]; states they can offer bed at [facility] tomorrow. CCP met with patient and patient's spouse at bedside and they are agreeable. CCP arranged [facility] WC van for 12:30 tomorrow. CCP updated RN and MD. Packet in office and pharmacy updated. Mya PENNY                   Discharge Codes    No documentation.                 Expected Discharge Date and Time       Expected Discharge Date Expected Discharge Time    Feb 27, 2025               HEMALATHA Reyez

## 2025-02-26 NOTE — PLAN OF CARE
Goal Outcome Evaluation:         POD1. Vss. NWM LLE. Dressing c/d/I. Aox4. Abx infused as ordered. Pain controlled with PRN pain medication. ACHS SSI. Discharge plan to [facility] tomorrow via wheel chair van at 1230PM.

## 2025-02-26 NOTE — THERAPY EVALUATION
Patient Name: Marco Antonio Kendrick  : 1950    MRN: [id number]                              Today's Date: 2025       Admit Date: 2025    Visit Dx:     ICD-10-CM ICD-9-CM   1. Closed displaced oblique fracture of shaft of left tibia, initial encounter  S82.232A 823.20   2. Displaced comminuted fracture of shaft of left fibula, initial encounter for closed fracture  S82.452A 823.21     Patient Active Problem List   Diagnosis    Cancer of sigmoid colon    Sick sinus syndrome    Primary hypertension    History of permanent cardiac pacemaker placement    Hypothyroidism    Type 2 diabetes mellitus without complication    Hyperlipidemia    Family history of colon cancer    Screening PSA (prostate specific antigen)    Class 2 severe obesity due to excess calories with serious comorbidity and body mass index (BMI) of 36.0 to 36.9 in adult    Closed displaced oblique fracture of shaft of left tibia    Displaced fracture of left fibula    Type 2 diabetes mellitus with hyperglycemia, without long-term current use of insulin     Past Medical History:   Diagnosis Date    Arthritis     Bipolar disorder     Bladder wall thickening     MILD NON-SPECIFIC URINARY BLADDER....FOLLOWED BY DR COBURN    Bone marrow suppression     SIGNIFICANT MYELOSUPRESSION FROM FOLFOX-4 CHEMOTHERAPY    Cancer of sigmoid colon     STAGE III (T3N1M0)    Depression     Disease of thyroid gland     HYPOTHYROIDISM    Diverticula of colon     Enlarged prostate     H/O allergy to eggs     Headache     Hyperlipidemia     Hypertension     Hyperthyroidism     Obesity     Pneumonia 2015    PONV (postoperative nausea and vomiting)     Sick sinus syndrome     S/P LEFT CHEST PACEMAKER IMPLANTATION    Splenomegaly      Past Surgical History:   Procedure Laterality Date    APPENDECTOMY      COLON SURGERY      RESECTION    COLONOSCOPY  2015    DR. MCLEOD    COLONOSCOPY N/A 2016    Procedure: COLONOSCOPY to cecum and ti;  Surgeon: Leonel HAYNES  MD [place];  Location: [facility] ENDOSCOPY;  Service:     COLONOSCOPY N/A 6/16/2021    Procedure: COLONOSCOPY TO CECUM (SIGMOID RESECTION);  Surgeon: Loenel Cage MD;  Location: [facility] ENDOSCOPY;  Service: Gastroenterology;  Laterality: N/A;  pre:  personal hx of polyps and colon ca  post: DIVERTICULOSIS, HEMORRHOIDS    CYSTOSCOPY      BY DR COBURN    PACEMAKER IMPLANTATION Left       General Information       Row Name 02/26/25 1110          Physical Therapy Time and Intention    Document Type evaluation  -EM     Mode of Treatment individual therapy;physical therapy  -EM       Row Name 02/26/25 1110          General Information    Patient Profile Reviewed yes  -EM     Prior Level of Function independent:  -EM     Existing Precautions/Restrictions fall;non-weight bearing  NWB LLE  -EM       Row Name 02/26/25 1110          Living Environment    People in Home spouse  -EM       Row Name 02/26/25 1110          Home Main Entrance    Number of Stairs, Main Entrance none  -EM       Row Name 02/26/25 1110          Stairs Within Home, Primary    Stairs, Within Home, Primary pt reports he can enter home into basement without using stairs and stay down there  -EM       Row Name 02/26/25 1110          Cognition    Orientation Status (Cognition) oriented x 4  -EM       Row Name 02/26/25 1110          Safety Issues/Impairments Affecting Functional Mobility    Impairments Affecting Function (Mobility) balance;endurance/activity tolerance;strength;pain  -EM               User Key  (r) = Recorded By, (t) = Taken By, (c) = Cosigned By      Initials Name Provider Type    EM Josi Maria, PT Physical Therapist                   Mobility       Row Name 02/26/25 1111          Bed Mobility    Bed Mobility supine-sit  -EM     Supine-Sit [place] (Bed Mobility) contact guard  -EM     Assistive Device (Bed Mobility) head of bed elevated  -EM       Row Name 02/26/25 1111          Sit-Stand Transfer    Sit-Stand [place]  (Transfers) minimum assist (75% patient effort);2 person assist;verbal cues  -EM     Assistive Device (Sit-Stand Transfers) walker, front-wheeled  -EM       Row Name 02/26/25 1111          Gait/Stairs (Locomotion)    [place] Level (Gait) minimum assist (75% patient effort);2 person assist  -EM     Assistive Device (Gait) walker, front-wheeled  -EM     Distance in Feet (Gait) 8  -EM     Comment, (Gait/Stairs) pt able to hop short distance from bed to chair, able to maintain NWB LLE, slightly unsteady  -EM       Row Name 02/26/25 1111          Mobility    Extremity Weight-bearing Status left lower extremity  -EM     Left Lower Extremity (Weight-bearing Status) non weight-bearing (NWB)  -EM               User Key  (r) = Recorded By, (t) = Taken By, (c) = Cosigned By      Initials Name Provider Type    EM Josi Maria, PT Physical Therapist                   Obj/Interventions       Row Name 02/26/25 1112          Range of Motion Comprehensive    General Range of Motion no range of motion deficits identified  -EM     Comment, General Range of Motion LLE in splint  -EM       Row Name 02/26/25 1112          Strength Comprehensive (MMT)    Comment, General Manual Muscle Testing (MMT) Assessment LLE not tested, expected post op weakness noted  -EM       Row Name 02/26/25 1112          Motor Skills    Therapeutic Exercise other (see comments)  on RLE: AP, LAQ x 5 reps  -EM       Row Name 02/26/25 1112          Balance    Balance Assessment sitting static balance;sitting dynamic balance;standing static balance;standing dynamic balance  -EM     Static Sitting Balance supervision  -EM     Dynamic Sitting Balance supervision  -EM     Position, Sitting Balance sitting edge of bed  -EM     Static Standing Balance contact guard  -EM     Dynamic Standing Balance minimal assist  -EM       Row Name 02/26/25 1112          Sensory Assessment (Somatosensory)    Sensory Assessment (Somatosensory) sensation intact  -EM                User Key  (r) = Recorded By, (t) = Taken By, (c) = Cosigned By      Initials Name Provider Type    Josi Luis PT Physical Therapist                   Goals/Plan       Row Name 02/26/25 1117          Bed Mobility Goal 1 (PT)    Activity/Assistive Device (Bed Mobility Goal 1, PT) bed mobility activities, all  -EM     [place] Level/Cues Needed (Bed Mobility Goal 1, PT) supervision required  -EM     Time Frame (Bed Mobility Goal 1, PT) 1 week  -EM       Row Name 02/26/25 1117          Transfer Goal 1 (PT)    Activity/Assistive Device (Transfer Goal 1, PT) transfers, all;walker, rolling  -EM     [place] Level/Cues Needed (Transfer Goal 1, PT) minimum assist (75% or more patient effort)  -EM     Time Frame (Transfer Goal 1, PT) 1 week  -EM       Row Name 02/26/25 1117          Gait Training Goal 1 (PT)    Activity/Assistive Device (Gait Training Goal 1, PT) gait (walking locomotion);walker, rolling;maintain weight-bearing status  -EM     [place] Level (Gait Training Goal 1, PT) minimum assist (75% or more patient effort)  -EM     Distance (Gait Training Goal 1, PT) 20  -EM     Time Frame (Gait Training Goal 1, PT) 1 week  -EM       Row Name 02/26/25 1117          Therapy Assessment/Plan (PT)    Planned Therapy Interventions (PT) bed mobility training;gait training;home exercise program;patient/family education;transfer training;strengthening  -EM               User Key  (r) = Recorded By, (t) = Taken By, (c) = Cosigned By      Initials Name Provider Type    Josi Luis PT Physical Therapist                   Clinical Impression       Row Name 02/26/25 1113          Pain    Pretreatment Pain Rating 8/10  -EM     Pain Location extremity  -EM     Pain Side/Orientation left;lower  -EM     Pain Management Interventions cold applied  -EM     Pre/Posttreatment Pain Comment pt reports he has requested pain meds  -EM       Row Name 02/26/25 1113          Plan of Care Review    Plan of  Care Reviewed With patient  -EM     Outcome Evaluation Pt is 75 yo male admitted to [facility] after fall at home with resultant distal L tib/fix fx, now s/p IM nailing. PMH significant for DM, HTN, PPM. Patient lives with spouse, independent with mobility prior to admission, can enter home into basement without using stairs. Pt rates pain at 8/10 level, verbalizes understanding of NWB status. Patient performed bed mobility with CGA, sit to stand with Prosper, able to hop a few feet from bed to chair with rwx and minAx2. Pt able to maintain NWB status on LLE. Pt has impairments consisting of generalized post op weakness and pain, decreased activity tolerance, decreased balance and would benefit from skilled PT. d/c recommendation is rehab.  -EM       Row Name 02/26/25 1113          Therapy Assessment/Plan (PT)    Patient/Family Therapy Goals Statement (PT) get better  -EM     Rehab Potential (PT) good  -EM     Criteria for Skilled Interventions Met (PT) yes;skilled treatment is necessary  -EM     Therapy Frequency (PT) 5 times/wk  -EM       Row Name 02/26/25 1113          Positioning and Restraints    Pre-Treatment Position in bed  -EM     Post Treatment Position chair  -EM     In Chair reclined;call light within reach;exit alarm on  -EM               User Key  (r) = Recorded By, (t) = Taken By, (c) = Cosigned By      Initials Name Provider Type    EM Josi Maria, PT Physical Therapist                   Outcome Measures       Row Name 02/26/25 1117          How much help from another person do you currently need...    Turning from your back to your side while in flat bed without using bedrails? 4  -EM     Moving from lying on back to sitting on the side of a flat bed without bedrails? 3  -EM     Moving to and from a bed to a chair (including a wheelchair)? 3  -EM     Standing up from a chair using your arms (e.g., wheelchair, bedside chair)? 3  -EM     Climbing 3-5 steps with a railing? 2  -EM     To walk in hospital  room? 2  -EM     AM-PAC 6 Clicks Score (PT) 17  -EM               User Key  (r) = Recorded By, (t) = Taken By, (c) = Cosigned By      Initials Name Provider Type    EM Josi Maria PT Physical Therapist                                 Physical Therapy Education       Title: PT OT SLP Therapies (In Progress)       Topic: Physical Therapy (In Progress)       Point: Mobility training (In Progress)       Learning Progress Summary            Patient Acceptance, E, NR by EM at 2/26/2025 1118                      Point: Home exercise program (In Progress)       Learning Progress Summary            Patient Acceptance, E, NR by EM at 2/26/2025 1118                      Point: Body mechanics (Not Started)       Learner Progress:  Not documented in this visit.              Point: Precautions (In Progress)       Learning Progress Summary            Patient Acceptance, E, NR by EM at 2/26/2025 1118                                      User Key       Initials Effective Dates Name Provider Type Discipline     06/16/21 -  Josi Maria PT Physical Therapist PT                  PT Recommendation and Plan  Planned Therapy Interventions (PT): bed mobility training, gait training, home exercise program, patient/family education, transfer training, strengthening  Outcome Evaluation: Pt is 73 yo male admitted to [facility] after fall at home with resultant distal L tib/fix fx, now s/p IM nailing. PMH significant for DM, HTN, PPM. Patient lives with spouse, independent with mobility prior to admission, can enter home into basement without using stairs. Pt rates pain at 8/10 level, verbalizes understanding of NWB status. Patient performed bed mobility with CGA, sit to stand with Prosper, able to hop a few feet from bed to chair with rwx and minAx2. Pt able to maintain NWB status on LLE. Pt has impairments consisting of generalized post op weakness and pain, decreased activity tolerance, decreased balance and would benefit from  skilled PT. d/c recommendation is rehab.     Time Calculation:         PT Charges       Row Name 02/26/25 1118             Time Calculation    Start Time 0938  -EM      Stop Time 0952  -EM      Time Calculation (min) 14 min  -EM      PT Received On 02/26/25  -EM      PT - Next Appointment 02/27/25  -EM      PT Goal Re-Cert Due Date 03/05/25  -EM         Time Calculation- PT    Total Timed Code Minutes- PT 10 minute(s)  -EM         Timed Charges    48031 - PT Therapeutic Activity Minutes 10  -EM         Total Minutes    Timed Charges Total Minutes 10  -EM       Total Minutes 10  -EM                User Key  (r) = Recorded By, (t) = Taken By, (c) = Cosigned By      Initials Name Provider Type    EM Josi Maria PT Physical Therapist                  Therapy Charges for Today       Code Description Service Date Service Provider Modifiers Qty    24384294507 HC PT THERAPEUTIC ACT EA 15 MIN 2/26/2025 Josi Maria, PT GP 1    62986003206 HC PT EVAL MOD COMPLEXITY 3 2/26/2025 Josi Maria, PT GP 1    31112364333 HC PT THER SUPP EA 15 MIN 2/26/2025 Josi Maria, PT GP 1            PT G-Codes  AM-PAC 6 Clicks Score (PT): 17  PT Discharge Summary  Anticipated Discharge Disposition (PT): skilled nursing facility    Josi Maria PT  2/26/2025

## 2025-02-26 NOTE — DISCHARGE PLACEMENT REQUEST
"Marco Antonio Riddle (74 y.o. Male)       Date of Birth   1950    Social Security Number       Address   [address] [place], [place] [address]    Home Phone   [phone]    MRN   [id number]       Quaker   Unknown    Marital Status                               Admission Date   2/24/25    Admission Type   Emergency    Admitting Provider   Ronnie Griffith MD    Attending Provider   Abdulkadir Duenas DO    Department, Room/Bed   [address], P781/1       Discharge Date       Discharge Disposition       Discharge Destination                                 Attending Provider: Abdulkadir Duenas DO    Allergies: Egg-derived Products    Isolation: None   Infection: None   Code Status: CPR    Ht: 180.3 cm (71\")   Wt: 113 kg (249 lb 9 oz)    Admission Cmt: None   Principal Problem: Closed displaced oblique fracture of shaft of left tibia [S82.232A]                   Active Insurance as of 2/24/2025       Primary Coverage       Payor Plan Insurance Group Employer/Plan Group    MEDICARE MEDICARE A & B        Payor Plan Address Payor Plan Phone Number Payor Plan Fax Number Effective Dates    [address] [phone]  1/1/2000 - None Entered    [facility] [id number]         Subscriber Name Subscriber Birth Date Member ID       MARCO ANTONIO RIDDLE 1950 [id number]               Secondary Coverage       Payor Plan Insurance Group Employer/Plan Group    [facility] SUPP [id number]       Payor Plan Address Payor Plan Phone Number Payor Plan Fax Number Effective Dates    [address]   12/1/2016 - None Entered    [facility] [id number]         Subscriber Name Subscriber Birth Date Member ID       MIREILLEMARCO ANTONIO TRIP 1950 [id number]                     Emergency Contacts        (Rel.) Home Phone Work Phone Mobile Phone    MireilleCarmelita (Spouse) [phone] -- [phone]              "

## 2025-02-26 NOTE — CASE MANAGEMENT/SOCIAL WORK
Discharge Planning Assessment  [facility]     Patient Name: Marco Antonio Kendrick  MRN: [id number]  Today's Date: 2/26/2025    Admit Date: 2/24/2025    Plan: Home with home health vs. pending referral to BAR and SNF; Follow PT eval to help determine d/c needs   Discharge Needs Assessment       Row Name 02/26/25 0935       Living Environment    People in Home spouse    Current Living Arrangements home    Potentially Unsafe Housing Conditions none    Primary Care Provided by self    Provides Primary Care For no one    Family Caregiver if Needed spouse    Quality of Family Relationships involved;helpful;supportive    Able to Return to Prior Arrangements yes       Resource/Environmental Concerns    Resource/Environmental Concerns none    Home Accessibility Concerns none    Transportation Concerns none       Transition Planning    Patient/Family Anticipates Transition to home with family    Patient/Family Anticipated Services at Transition rehabilitation services;home health care       Discharge Needs Assessment    Readmission Within the Last 30 Days no previous admission in last 30 days    Current Outpatient/Agency/Support Group skilled nursing facility;homecare agency    Equipment Currently Used at Home none    Concerns to be Addressed no discharge needs identified;denies needs/concerns at this time    Anticipated Changes Related to Illness none    Equipment Needed After Discharge none    Outpatient/Agency/Support Group Needs skilled nursing facility;inpatient rehabilitation facility;homecare agency                   Discharge Plan       Row Name 02/26/25 0936       Plan    Plan Home with home health vs. pending referral to BAR and SNF; Follow PT eval to help determine d/c needs    Plan Comments CCP met with patient at bedside. CCP role explained and discharge planning discussed. Face sheet verified and IMM noted. Patient's PCP is Dr. Cooper. Patient lives with his wife. Patient has no steps to his walk out basement and  has option to stay in the basement to avoid steps. Patient is independent with his ADLs and does not use DME. Patient has not used home health or been to SNF. Patient states he may need rehab at d/c and is interested in BAR. CCP discussed acute vs. subacute. If patient does not meet criteria for BAR, he prefers highly rated nursing facility in [facility]. Referral placed to [facility] and [facility]. If patient is able to return home, he prefers [facility]. If [facility] is unable to accept he is agreeable to other home health referrals. CCP will follow for PT eval to help determine d/c needs. Mya PENNY                  Continued Care and Services - Admitted Since 2/24/2025       Destination       Service Provider Request Status Services Address Phone Fax Patient Preferred    [facility] Pending - Request Sent -- [id number] [facility] [address] [phone] --    [facility] Pending - Request Sent -- [address] [facility] [id number] [phone] --    [facility] Pending - Request Sent -- [address] [facility] [facility] [address] [phone] --                  Selected Continued Care - Episodes Includes continued care and service providers with selected services from the active episodes listed below      High Risk Care Management Episode start date: 2/25/2025 (Paused)   There are no active outsourced providers for this episode.                 Expected Discharge Date and Time       Expected Discharge Date Expected Discharge Time    Feb 28, 2025            Demographic Summary       Row Name 02/26/25 0934       General Information    Admission Type inpatient    Arrived From emergency department    Required Notices Provided Important Message from Medicare    Referral Source admission list    Reason for Consult discharge planning    Preferred Language English                    Functional Status       Row Name 02/26/25 0935       Functional Status    Usual Activity Tolerance good    Current Activity Tolerance moderate       Functional Status, IADL    Medications independent    Meal Preparation independent    Housekeeping independent    Laundry independent    Shopping independent       Mental Status    General Appearance WDL WDL       Mental Status Summary    Recent Changes in Mental Status/Cognitive Functioning no changes                   Psychosocial    No documentation.                  Abuse/Neglect    No documentation.                  Legal    No documentation.                  Substance Abuse    No documentation.                  Patient Forms    No documentation.                     HEMALATHA Reyez

## 2025-02-26 NOTE — PLAN OF CARE
Goal Outcome Evaluation:  Plan of Care Reviewed With: patient           Outcome Evaluation: Pt is 73 yo male admitted to [facility] after fall at home with resultant distal L tib/fix fx, now s/p IM nailing. PMH significant for DM, HTN, PPM. Patient lives with spouse, independent with mobility prior to admission, can enter home into basement without using stairs. Pt rates pain at 8/10 level, verbalizes understanding of NWB status. Patient performed bed mobility with CGA, sit to stand with Prosper, able to hop a few feet from bed to chair with rwx and minAx2. Pt able to maintain NWB status on LLE. Pt has impairments consisting of generalized post op weakness and pain, decreased activity tolerance, decreased balance and would benefit from skilled PT. d/c recommendation is rehab.    Anticipated Discharge Disposition (PT): skilled nursing facility

## 2025-02-26 NOTE — PROGRESS NOTES
Name: Marco Antonio Kendrick ADMIT: 2025   : 1950  PCP: Veronica Cooper MD    MRN: [id number] LOS: 2 days   AGE/SEX: 74 y.o. male  ROOM: 81/1     Subjective   Subjective   2025  Patient seen and examined at bedside, he underwent IM nailing of left tibia fracture yesterday without complication.  PT is recommending SNF.       Objective   Objective   Vital Signs  Temp:  [97.8 °F (36.6 °C)-99 °F (37.2 °C)] 98.1 °F (36.7 °C)  Heart Rate:  [57-71] 65  Resp:  [14-16] 16  BP: (109-146)/(58-81) 121/63  SpO2:  [91 %-100 %] 96 %  on  Flow (L/min) (Oxygen Therapy):  [2-4] 3;   Device (Oxygen Therapy): room air  Body mass index is 34.81 kg/m².  Physical Exam  Constitutional:       General: He is not in acute distress.     Appearance: He is obese.   HENT:      Head: Normocephalic and atraumatic.      Nose: Nose normal. No congestion.      Mouth/Throat:      Pharynx: Oropharynx is clear. No oropharyngeal exudate.   Eyes:      General: No scleral icterus.  Cardiovascular:      Rate and Rhythm: Normal rate and regular rhythm.      Heart sounds: No murmur heard.     No friction rub. No gallop.   Pulmonary:      Effort: No respiratory distress.      Breath sounds: No wheezing or rales.   Abdominal:      General: There is no distension.      Tenderness: There is no abdominal tenderness. There is no guarding.   Musculoskeletal:         General: No deformity.      Cervical back: Normal range of motion. No rigidity.      Comments: Left leg is in splint   Skin:     Coloration: Skin is not jaundiced.      Findings: No bruising or lesion.   Neurological:      General: No focal deficit present.      Mental Status: He is alert and oriented to person, place, and time.       Results Review     I reviewed the patient's new clinical results.  Results from last 7 days   Lab Units 25  0520 25  0641 25  1739   WBC 10*3/mm3 8.77 6.51 5.93   HEMOGLOBIN g/dL 10.2* 11.4* 12.7*   PLATELETS 10*3/mm3 142 142 186      Results from last 7 days   Lab Units 02/26/25  0520 02/25/25  0641 02/24/25  1739   SODIUM mmol/L 136 142 141   POTASSIUM mmol/L 4.3 4.1 4.1   CHLORIDE mmol/L 103 106 105   CO2 mmol/L 24.3 27.0 25.3   BUN mg/dL 17 14 13   CREATININE mg/dL 1.09 1.14 1.25   GLUCOSE mg/dL 185* 132* 120*   EGFR mL/min/1.73 70.3 66.7 59.7*     Results from last 7 days   Lab Units 02/24/25  1739   ALBUMIN g/dL 3.3*   BILIRUBIN mg/dL 0.4   ALK PHOS U/L 72   AST (SGOT) U/L 19   ALT (SGPT) U/L 13     Results from last 7 days   Lab Units 02/26/25  0520 02/25/25  0641 02/24/25  1739   CALCIUM mg/dL 8.7 8.7 9.3   ALBUMIN g/dL  --   --  3.3*       Hemoglobin A1C   Date/Time Value Ref Range Status   02/24/2025 1739 6.30 (H) 4.80 - 5.60 % Final     Glucose   Date/Time Value Ref Range Status   02/26/2025 1053 158 (H) 70 - 130 mg/dL Final   02/26/2025 0715 172 (H) 70 - 130 mg/dL Final   02/25/2025 2226 169 (H) 70 - 130 mg/dL Final   02/25/2025 1759 149 (H) 70 - 130 mg/dL Final   02/25/2025 1141 117 70 - 130 mg/dL Final   02/25/2025 0740 129 70 - 130 mg/dL Final   02/24/2025 2238 119 70 - 130 mg/dL Final       XR Tibia Fibula 2 View Left    Result Date: 2/26/2025   Postsurgical changes.    This report was finalized on 2/26/2025 1:49 PM by Dr. Bryson Ambriz M.D on Workstation: [id number]      CT Lower Extremity Left Without Contrast    Result Date: 2/24/2025  Oblique proximal fibular and distal tibial fractures as above, without articular involvement.   This report was finalized on 2/24/2025 8:27 PM by Dr. Grant Mauro M.D on Workstation: [id number]       I have personally reviewed all medications:  Scheduled Medications  [Held by provider] amLODIPine, 5 mg, Oral, Daily  ceFAZolin, 2,000 mg, Intravenous, Q8H  enoxaparin, 40 mg, Subcutaneous, Daily  fentaNYL citrate (PF), 50 mcg, Intravenous, Once  insulin lispro, 2-7 Units, Subcutaneous, 4x Daily AC & at Bedtime  levothyroxine, 200 mcg, Oral, Daily  lisinopril, 10 mg, Oral,  Q24H  senna-docusate sodium, 2 tablet, Oral, BID  sertraline, 150 mg, Oral, Daily    Infusions   Diet  Diet: Diabetic; Consistent Carbohydrate; Fluid Consistency: Thin (IDDSI 0)    I have personally reviewed:  [x]  Laboratory   [x]  Microbiology   [x]  Radiology   [x]  EKG/Telemetry  [x]  Cardiology/Vascular   []  Pathology    []  Records       Assessment/Plan     Active Hospital Problems    Diagnosis  POA    **Closed displaced oblique fracture of shaft of left tibia [S82.232A]  Yes    Displaced fracture of left fibula [S82.402A]  Yes    Type 2 diabetes mellitus with hyperglycemia, without long-term current use of insulin [E11.65]  Yes    Hyperlipidemia [E78.5]  Yes    History of permanent cardiac pacemaker placement [Z95.0]  Yes    Primary hypertension [I10]  Yes    Cancer of sigmoid colon [C18.7]  Yes      Resolved Hospital Problems   No resolved problems to display.       74 y.o. male admitted with Closed displaced oblique fracture of shaft of left tibia.    #Left tibial fracture    -Status post IM nailing of left tibia on 2/25/2025    -PT recommends SNF, Ortho has cleared    -Lovenox for DVT prophylaxis    -Pain control as per Ortho      #DM    -ISS    #SSS    -Status post pacemaker    #Hypothyroid    -Resume home Synthroid    #History of colon cancer    -Status post surgery    #Hyperlipidemia    -Not on statin, follow-up with PCP    #Hypertension    -Lisinopril 10 mg p.o. daily    Lovenox 40 mg SC daily for DVT prophylaxis.  Full code.  Discussed with patient.  Anticipate discharge to SNU facility tomorrow.  Expected Discharge Date: 2/27/2025; Expected Discharge Time:  3:00 PM      DO [facility]  02/26/25  14:42 EST

## 2025-02-27 VITALS
HEIGHT: 71 IN | SYSTOLIC BLOOD PRESSURE: 118 MMHG | DIASTOLIC BLOOD PRESSURE: 69 MMHG | BODY MASS INDEX: 34.94 KG/M2 | WEIGHT: 249.56 LBS | OXYGEN SATURATION: 100 % | HEART RATE: 70 BPM | TEMPERATURE: 99.7 F | RESPIRATION RATE: 16 BRPM

## 2025-02-27 LAB
ANION GAP SERPL CALCULATED.3IONS-SCNC: 10.3 MMOL/L (ref 5–15)
BUN SERPL-MCNC: 20 MG/DL (ref 8–23)
BUN/CREAT SERPL: 17.9 (ref 7–25)
CALCIUM SPEC-SCNC: 8.7 MG/DL (ref 8.6–10.5)
CHLORIDE SERPL-SCNC: 105 MMOL/L (ref 98–107)
CO2 SERPL-SCNC: 24.7 MMOL/L (ref 22–29)
CREAT SERPL-MCNC: 1.12 MG/DL (ref 0.76–1.27)
DEPRECATED RDW RBC AUTO: 42 FL (ref 37–54)
EGFRCR SERPLBLD CKD-EPI 2021: 68.1 ML/MIN/1.73
ERYTHROCYTE [DISTWIDTH] IN BLOOD BY AUTOMATED COUNT: 12.7 % (ref 12.3–15.4)
GLUCOSE BLDC GLUCOMTR-MCNC: 119 MG/DL (ref 70–130)
GLUCOSE BLDC GLUCOMTR-MCNC: 124 MG/DL (ref 70–130)
GLUCOSE SERPL-MCNC: 120 MG/DL (ref 65–99)
HCT VFR BLD AUTO: 30.8 % (ref 37.5–51)
HGB BLD-MCNC: 10.2 G/DL (ref 13–17.7)
MCH RBC QN AUTO: 30.3 PG (ref 26.6–33)
MCHC RBC AUTO-ENTMCNC: 33.1 G/DL (ref 31.5–35.7)
MCV RBC AUTO: 91.4 FL (ref 79–97)
PLATELET # BLD AUTO: 135 10*3/MM3 (ref 140–450)
PMV BLD AUTO: 11.3 FL (ref 6–12)
POTASSIUM SERPL-SCNC: 4.1 MMOL/L (ref 3.5–5.2)
RBC # BLD AUTO: 3.37 10*6/MM3 (ref 4.14–5.8)
SODIUM SERPL-SCNC: 140 MMOL/L (ref 136–145)
WBC NRBC COR # BLD AUTO: 7.3 10*3/MM3 (ref 3.4–10.8)

## 2025-02-27 PROCEDURE — 25010000002 ENOXAPARIN PER 10 MG: Performed by: ORTHOPAEDIC SURGERY

## 2025-02-27 PROCEDURE — 82948 REAGENT STRIP/BLOOD GLUCOSE: CPT

## 2025-02-27 PROCEDURE — 80048 BASIC METABOLIC PNL TOTAL CA: CPT | Performed by: STUDENT IN AN ORGANIZED HEALTH CARE EDUCATION/TRAINING PROGRAM

## 2025-02-27 PROCEDURE — 85027 COMPLETE CBC AUTOMATED: CPT | Performed by: STUDENT IN AN ORGANIZED HEALTH CARE EDUCATION/TRAINING PROGRAM

## 2025-02-27 RX ORDER — HYDROCODONE BITARTRATE AND ACETAMINOPHEN 7.5; 325 MG/1; MG/1
1 TABLET ORAL EVERY 4 HOURS PRN
Qty: 20 TABLET | Refills: 0 | Status: SHIPPED | OUTPATIENT
Start: 2025-02-27

## 2025-02-27 RX ORDER — ENOXAPARIN SODIUM 100 MG/ML
40 INJECTION SUBCUTANEOUS DAILY
Qty: 4 ML | Refills: 0 | Status: SHIPPED | OUTPATIENT
Start: 2025-02-27

## 2025-02-27 RX ADMIN — LEVOTHYROXINE SODIUM 200 MCG: 100 TABLET ORAL at 08:46

## 2025-02-27 RX ADMIN — HYDROCODONE BITARTRATE AND ACETAMINOPHEN 2 TABLET: 7.5; 325 TABLET ORAL at 05:45

## 2025-02-27 RX ADMIN — ENOXAPARIN SODIUM 40 MG: 100 INJECTION SUBCUTANEOUS at 08:47

## 2025-02-27 RX ADMIN — LISINOPRIL 10 MG: 10 TABLET ORAL at 08:47

## 2025-02-27 RX ADMIN — HYDROCODONE BITARTRATE AND ACETAMINOPHEN 2 TABLET: 7.5; 325 TABLET ORAL at 11:29

## 2025-02-27 RX ADMIN — SENNOSIDES AND DOCUSATE SODIUM 2 TABLET: 50; 8.6 TABLET ORAL at 08:46

## 2025-02-27 RX ADMIN — SERTRALINE HYDROCHLORIDE 150 MG: 100 TABLET, FILM COATED ORAL at 08:47

## 2025-02-27 NOTE — SIGNIFICANT NOTE
Case Management Discharge Note      Final Note: (P) [facility]         Selected Continued Care - Discharged on 2/27/2025 Admission date: 2/24/2025 - Discharge disposition: Skilled Nursing Facility (DC - External)      Destination Coordination complete.      Service Provider Services Address Phone Fax Patient Preferred    [facility] [address] [address] [phone] --              Durable Medical Equipment    No services have been selected for the patient.                Dialysis/Infusion    No services have been selected for the patient.                Home Medical Care    No services have been selected for the patient.                Therapy    No services have been selected for the patient.                Community Resources    No services have been selected for the patient.                Community & DME    No services have been selected for the patient.                    Selected Continued Care - Episodes Includes continued care and service providers with selected services from the active episodes listed below      High Risk Care Management Episode start date: 2/25/2025 (Paused)   There are no active outsourced providers for this episode.                      Final Discharge Disposition Code: (P) 03 - skilled nursing facility (SNF)

## 2025-02-27 NOTE — PLAN OF CARE
Goal Outcome Evaluation:      All goals met and patient safe for discharge

## 2025-02-27 NOTE — PLAN OF CARE
Goal Outcome Evaluation:  Plan of Care Reviewed With: patient        Progress: improving  Outcome Evaluation: Pt remains stable. Worked with PT, NWB to LLE but was able to tx to chair. Splint in place. Norco prn for pain. Voiding per urinal. Monitoring BG levels. Bed available at rehab today.

## 2025-02-27 NOTE — DISCHARGE INSTRUCTIONS
#Discharge plan    -Follow-up with PCP in 3 to 5 days    - follow up 2-3 weeks with Dr. Bermudez at [facility] ([phone] to make appointment)     -Take Norco 7.5/325 mg p.o. every 4 hours as needed pain    -Lovenox 40 mg SC daily while at SNF    -Nonweightbearing until follow up appointment with Dr Bermudez

## 2025-02-27 NOTE — DISCHARGE SUMMARY
Patient Name: Marco Antonio Kendrick  : 1950  MRN: [id number]    Date of Admission: 2025  Date of Discharge:  2025  Primary Care Physician: Veronica Cooper MD      Chief Complaint:   Ankle Injury      Discharge Diagnoses     Active Hospital Problems    Diagnosis  POA    **Closed displaced oblique fracture of shaft of left tibia [S82.232A]  Yes    Displaced fracture of left fibula [S82.402A]  Yes    Type 2 diabetes mellitus with hyperglycemia, without long-term current use of insulin [E11.65]  Yes    Hyperlipidemia [E78.5]  Yes    History of permanent cardiac pacemaker placement [Z95.0]  Yes    Primary hypertension [I10]  Yes    Cancer of sigmoid colon [C18.7]  Yes      Resolved Hospital Problems   No resolved problems to display.        Hospital Course     Mr. Kendrick is a 74 y.o. male with a history of hypertension, DM, colon cancer status post surgery, SSS status post pacemaker who presented to [facility] initially complaining of fall.  Please see the admitting history and physical for further details.  He was found to have left distal tibial shaft fracture along with proximal fibular fracture and was admitted to the hospital for further evaluation and treatment.  Ortho was consulted and performed left tibia IM nailing on 2025 without complication.  PT recommended SNF and Ortho signed off on .  Patient discharged to SNF on 2025 without complication agreement with discharge plan below.    #Discharge plan    -Follow-up with PCP in 3 to 5 days    - follow up 2-3 weeks with Dr. Bermudez at [facility] ([phone] to make appointment)     -Take Norco 7.5/325 mg p.o. every 4 hours as needed pain    -Lovenox 40 mg SC daily while at SNF    -Patient to discharge to SNF    Proximal fibular fracture and distal tibia fracture after fall.  Going for IM nailing of the tibia today.  Edited by: Baldo Agrawal MD at 2025 1346    Day of Discharge      Subjective:  No overnight events, patient is hemodynamically stable without complaint.  He is agreeable to discharge to SNF.  Ortho has signed off.  Discharge placed    Physical Exam:  Temp:  [98.1 °F (36.7 °C)-99.7 °F (37.6 °C)] 99.7 °F (37.6 °C)  Heart Rate:  [62-70] 70  Resp:  [16] 16  BP: (118-144)/(63-69) 118/69  Body mass index is 34.81 kg/m².  Physical Exam  Constitutional:       General: He is not in acute distress.     Appearance: He is obese.   HENT:      Head: Normocephalic and atraumatic.      Nose: Nose normal. No congestion.      Mouth/Throat:      Pharynx: Oropharynx is clear. No oropharyngeal exudate.   Eyes:      General: No scleral icterus.  Cardiovascular:      Rate and Rhythm: Normal rate and regular rhythm.      Heart sounds: No murmur heard.     No friction rub. No gallop.   Pulmonary:      Effort: No respiratory distress.      Breath sounds: No wheezing, rhonchi or rales.   Abdominal:      General: There is no distension.      Tenderness: There is no abdominal tenderness. There is no guarding.   Musculoskeletal:         General: No deformity.      Cervical back: Normal range of motion. No rigidity.      Comments: Left leg splint in place   Skin:     Coloration: Skin is not jaundiced.      Findings: No bruising or lesion.   Neurological:      General: No focal deficit present.      Mental Status: He is alert. Mental status is at baseline.         Consultants     Consult Orders (all) (From admission, onward)       Start     Ordered    02/24/25 1844  [facility] (on-call MD unless specified) Details  Once        Specialty:  Hospitalist  Provider:  (Not yet assigned)    02/24/25 1843    02/24/25 1817  Ortho (on-call MD unless specified)  Once        Specialty:  Orthopedic Surgery  Provider:  (Not yet assigned)    02/24/25 1816                  Procedures     Intramedullary nailing, left tibia fracture    Imaging Results (All)       Procedure Component Value Units Date/Time    XR Tibia Fibula 2 View  Left [[id number]] Collected: 02/26/25 1346     Updated: 02/26/25 1352    Narrative:      XR TIBIA FIBULA 2 VW LEFT-     INDICATIONS: Postoperative evaluation.     TECHNIQUE: 4 views of the left lower leg     COMPARISON: 2/24/2025     FINDINGS:      Intact appearing maximo and screw surgical hardware is seen transfixing  the tibia fracture, with adjacent surgical soft tissue gas, overlying  skin staples. Previous fibular fracture is again noted.          Impression:         Postsurgical changes.           This report was finalized on 2/26/2025 1:49 PM by Dr. Bryson Ambriz M.D on Workstation: [id number]       FL C Arm During Surgery [[id number]] Resulted: 02/25/25 1714     Updated: 02/25/25 1714    Narrative:      This procedure was auto-finalized with no dictation required.    XR Ankle 3+ View Left [[id number]] Collected: 02/24/25 1823     Updated: 02/25/25 0754    Narrative:      XR ANKLE 3+ VW LEFT-, XR TIBIA FIBULA 2 VW LEFT-     HISTORY: 74-year-old male status post a fall.     FINDINGS:  There is an obliquely oriented distal tibial shaft fracture with 8 mm  lateral displacement. There is also a nondisplaced fracture component  which extends into the distal tibial metaphysis. There is a obliquely  oriented fibular fracture with 5 mm lateral displacement. Ankle joint  appears intact. There may be an old healed lateral malleolus fracture.     This report was finalized on 2/25/2025 7:51 AM by Dr. Tia Chen M.D on  Workstation: [id number]       XR Tibia Fibula 2 View Left [[id number]] Collected: 02/24/25 1823     Updated: 02/25/25 0754    Narrative:      XR ANKLE 3+ VW LEFT-, XR TIBIA FIBULA 2 VW LEFT-     HISTORY: 74-year-old male status post a fall.     FINDINGS:  There is an obliquely oriented distal tibial shaft fracture with 8 mm  lateral displacement. There is also a nondisplaced fracture component  which extends into the distal tibial metaphysis. There is a obliquely  oriented fibular fracture with 5 mm  lateral displacement. Ankle joint  appears intact. There may be an old healed lateral malleolus fracture.     This report was finalized on 2/25/2025 7:51 AM by Dr. Tia Chen M.D on  Workstation: [id number]       CT Lower Extremity Left Without Contrast [[id number]] Collected: 02/24/25 2024     Updated: 02/24/25 2031    Narrative:         CT LOWER EXTREMITY LEFT WO CONTRAST-     COMPARISON: None.     HISTORY: pre-op, tib/fib fracture; S82.232A-Displaced oblique fracture  of shaft of left tibia, initial encounter for closed fracture;  S82.452A-Displaced comminuted fracture of shaft of left fibula, initial  encounter for closed fracture     TECHNIQUE: Multidetector helical CT acquisition through the left leg is  provided without   IV contrast. Coronal and sagittal multiplanar  reformatted images are provided. Radiation dose reduction techniques  were utilized, including automated exposure control, and exposure  modulation based on body size.     FINDINGS: There is an oblique distal tibial shaft fracture with about 1  cm of override, and about 30 to 40% lateral and posterior displacement.     There is an oblique proximal fibular shaft fracture, moderately  displaced, about 8 cm below the proximal talofibular joint.     There is no direct involvement of the knee or ankle joint.       Impression:      Oblique proximal fibular and distal tibial fractures as  above, without articular involvement.        This report was finalized on 2/24/2025 8:27 PM by Dr. Grant Mauro M.D on Workstation: [id number]                 Pertinent Labs     Results from last 7 days   Lab Units 02/27/25 0447 02/26/25  0520 02/25/25  0641 02/24/25  1739   WBC 10*3/mm3 7.30 8.77 6.51 5.93   HEMOGLOBIN g/dL 10.2* 10.2* 11.4* 12.7*   PLATELETS 10*3/mm3 135* 142 142 186     Results from last 7 days   Lab Units 02/27/25 0447 02/26/25  0520 02/25/25  0641 02/24/25  1739   SODIUM mmol/L 140 136 142 141   POTASSIUM mmol/L 4.1 4.3 4.1 4.1  "  CHLORIDE mmol/L 105 103 106 105   CO2 mmol/L 24.7 24.3 27.0 25.3   BUN mg/dL 20 17 14 13   CREATININE mg/dL 1.12 1.09 1.14 1.25   GLUCOSE mg/dL 120* 185* 132* 120*   EGFR mL/min/1.73 68.1 70.3 66.7 59.7*     Results from last 7 days   Lab Units 02/24/25  1739   ALBUMIN g/dL 3.3*   BILIRUBIN mg/dL 0.4   ALK PHOS U/L 72   AST (SGOT) U/L 19   ALT (SGPT) U/L 13     Results from last 7 days   Lab Units 02/27/25  0447 02/26/25  0520 02/25/25  0641 02/24/25  1739   CALCIUM mg/dL 8.7 8.7 8.7 9.3   ALBUMIN g/dL  --   --   --  3.3*               Invalid input(s): \"LDLCALC\"          Test Results Pending at Discharge     Pending Results       None              Discharge Details        Discharge Medications        New Medications        Instructions Start Date   Enoxaparin Sodium 40 MG/0.4ML solution prefilled syringe syringe  Commonly known as: LOVENOX   40 mg, Subcutaneous, Daily      HYDROcodone-acetaminophen 7.5-325 MG per tablet  Commonly known as: NORCO   1 tablet, Oral, Every 4 Hours PRN             Continue These Medications        Instructions Start Date   amLODIPine 5 MG tablet  Commonly known as: NORVASC   5 mg, Oral, Daily      aspirin 81 MG chewable tablet   81 mg, Daily      benazepril 10 MG tablet  Commonly known as: LOTENSIN   10 mg, Oral, Daily      levothyroxine 200 MCG tablet  Commonly known as: SYNTHROID, LEVOTHROID   200 mcg, Oral, Daily      Magnesium 200 MG tablet   1 tablet, As Needed      sertraline 100 MG tablet  Commonly known as: ZOLOFT   150 mg, Oral, Daily      vitamin B-12 1000 MCG tablet  Commonly known as: CYANOCOBALAMIN   1,000 mcg, Oral, Daily      VITAMIN D PO   Take  by mouth.             Stop These Medications      fluticasone 50 MCG/ACT nasal spray  Commonly known as: FLONASE     ibuprofen 600 MG tablet  Commonly known as: ADVIL,MOTRIN     metFORMIN 500 MG tablet  Commonly known as: GLUCOPHAGE     Ozempic (0.25 or 0.5 MG/DOSE) 2 MG/3ML solution pen-injector  Generic drug: Semaglutide(0.25 " or 0.5MG/DOS)     pioglitazone 30 MG tablet  Commonly known as: ACTOS     rosuvastatin 20 MG tablet  Commonly known as: Crestor              Allergies   Allergen Reactions    Egg-Derived Products Nausea And Vomiting       Discharge Disposition:  Skilled Nursing Facility (DC - External)      Discharge Diet:  Diet Order   Procedures    Diet: Diabetic; Consistent Carbohydrate; Fluid Consistency: Thin (IDDSI 0)       Discharge Activity:       CODE STATUS:    Code Status and Medical Interventions: CPR (Attempt to Resuscitate); Full   Ordered at: 02/24/25 1921     Code Status (Patient has no pulse and is not breathing):    CPR (Attempt to Resuscitate)     Medical Interventions (Patient has pulse or is breathing):    Full       Future Appointments   Date Time Provider Department Center   5/9/2025  9:30 AM [facility]   5/16/2025  2:00 PM Veronica Cooper MD [facility]   6/30/2025 11:00 AM [facility] [place] DEVICE CHECK [facility] None   6/30/2025 11:20 AM Bi Weldon MD [facility] None      Contact information for follow-up providers       Veronica Cooper MD .    Specialty: Internal Medicine  Contact information:  [address] [address] [address] [phone]                       Contact information for after-discharge care       Destination       [facility] .    Service: Skilled Nursing  Contact information:  [address] [facility] [address] [phone]                                   Time Spent on Discharge: 40 minutes      DO [facility]  02/27/25  07:31 EST

## 2025-03-10 ENCOUNTER — PATIENT OUTREACH (OUTPATIENT)
Dept: CASE MANAGEMENT | Facility: OTHER | Age: 75
End: 2025-03-10
Payer: MEDICARE

## 2025-03-10 NOTE — OUTREACH NOTE
AMBULATORY CASE MANAGEMENT NOTE    Names and Relationships of Patient/Support Persons: Contact: Flor; Relationship:  -     SNF Follow-up    Incoming call from Flor. No anticipated DC date at this time.         Kristy DOMINIQUE  Ambulatory Case Management    3/10/2025, 09:29 EDT

## 2025-03-14 ENCOUNTER — PATIENT OUTREACH (OUTPATIENT)
Dept: CASE MANAGEMENT | Facility: OTHER | Age: 75
End: 2025-03-14
Payer: MEDICARE

## 2025-03-14 NOTE — OUTREACH NOTE
AMBULATORY CASE MANAGEMENT NOTE    Names and Relationships of Patient/Support Persons: Contact: Flor; Relationship: Other -     SNF Follow-up    Per Flor there is no anticipated DC date at this time.         Kristy DOMINIQUE  Ambulatory Case Management    3/14/2025, 11:41 EDT

## 2025-03-20 ENCOUNTER — PATIENT OUTREACH (OUTPATIENT)
Dept: CASE MANAGEMENT | Facility: OTHER | Age: 75
End: 2025-03-20
Payer: MEDICARE

## 2025-03-20 NOTE — OUTREACH NOTE
AMBULATORY CASE MANAGEMENT NOTE    Names and Relationships of Patient/Support Persons: Contact: Flor; Relationship:  -     SNF Follow-up    Per Flor there is no anticipated DC date.         Kristy DOMINIQUE  Ambulatory Case Management    3/20/2025, 10:22 EDT

## 2025-03-27 ENCOUNTER — PATIENT OUTREACH (OUTPATIENT)
Dept: CASE MANAGEMENT | Facility: OTHER | Age: 75
End: 2025-03-27
Payer: MEDICARE

## 2025-03-27 ENCOUNTER — READMISSION MANAGEMENT (OUTPATIENT)
Dept: CALL CENTER | Facility: HOSPITAL | Age: 75
End: 2025-03-27
Payer: MEDICARE

## 2025-03-27 NOTE — OUTREACH NOTE
SNF Follow-up    Questions/Answers      Flowsheet Row Responses   Acute Facility Discharged From [facility]   Acute Discharge Date 02/27/25   Acute Facility Diagnoses Active Hospital Problems     Diagnosis     POA    **Closed displaced oblique fracture of shaft of left tibia (S82.232A)     Yes    Displaced fracture of left fibula (S82.402A)     Yes    Type 2 diabetes mellitus with hyperglycemia, without long-term current use of insulin (E11.65)     Yes    Hyperlipidemia (E78.5)     Yes    History of permanent cardiac pacemaker placement (Z95.0)     Yes    Primary hypertension (I10)     Yes    Cancer of sigmoid colon (C18.7)     Yes   Name of the Skilled Nursing Facility? [facility]   Skilled Nursing Discharge Date? 03/27/25   Where was the patient discharged to? Home        AMBULATORY CASE MANAGEMENT NOTE    Names and Relationships of Patient/Support Persons: Contact: Flor; Relationship:  -     Incoming message from Flor regarding planned DC today. In addition to above He will have [facility] and pt has requested scheduling his own follow up appointments.        Kristy DOMINIQUE  Ambulatory Case Management    3/27/2025, 10:27 EDT

## 2025-03-27 NOTE — OUTREACH NOTE
Prep Survey      Flowsheet Row Responses   [place] facility patient discharged from? Non-BH   Is LACE score < 7 ? Non-BH Discharge   Eligibility [facility]   Date of Discharge 03/27/25   Discharge Disposition Home or Self Care   Discharge diagnosis Closed displaced oblique fracture of shaft of left tibia   Does the patient have one of the following disease processes/diagnoses(primary or secondary)? Other   Does the patient have Home health ordered? Yes   What is the Home health agency?  [facility]    Prep survey completed? Yes            Tran BRUNO - Registered Nurse

## 2025-03-28 ENCOUNTER — TRANSITIONAL CARE MANAGEMENT TELEPHONE ENCOUNTER (OUTPATIENT)
Dept: CALL CENTER | Facility: HOSPITAL | Age: 75
End: 2025-03-28
Payer: MEDICARE

## 2025-03-28 NOTE — OUTREACH NOTE
Call Center TCM Note      Flowsheet Row Responses   [facility] [place] patient discharged from? Non-   Does the patient have one of the following disease processes/diagnoses(primary or secondary)? Other   TCM attempt successful? Yes   Call start time 1202   Call end time 1205   Discharge diagnosis Closed displaced oblique fracture of shaft of left tibia   Meds reviewed with patient/caregiver? Yes   Is the patient having any side effects they believe may be caused by any medication additions or changes? No   Does the patient have all medications ordered at discharge? N/A   Prescription comments (pt/family is discussing the med list with the facility he was recently discharged from)   Is the patient taking all medications as directed (includes completed medication regime)? Yes   Comments Hosp dc fu apt on 3/31/25   Does the patient have an appointment with their PCP within 7-14 days of discharge? Yes   What is the Home health agency?  [facility]    Has home health visited the patient within 72 hours of discharge? Yes   Home health comments HH to visit today (3/28/25)   Did the patient receive a copy of their discharge instructions? Yes   Nursing interventions Reviewed instructions with patient   What is the patient's perception of their health status since discharge? Improving   Is the patient/caregiver able to teach back signs and symptoms related to disease process for when to call PCP? Yes   Is the patient/caregiver able to teach back signs and symptoms related to disease process for when to call 911? Yes   Is the patient/caregiver able to teach back the hierarchy of who to call/visit for symptoms/problems? PCP, Specialist, Home health nurse, Urgent Care, ED, 911 Yes   If the patient is a current smoker, are they able to teach back resources for cessation? Not a smoker   TCM call completed? Yes   Call end time 1205            Khloe H - Registered Nurse    3/28/2025, 12:25 EDT

## 2025-04-01 ENCOUNTER — OFFICE VISIT (OUTPATIENT)
Dept: FAMILY MEDICINE CLINIC | Facility: CLINIC | Age: 75
End: 2025-04-01
Payer: MEDICARE

## 2025-04-01 VITALS
OXYGEN SATURATION: 96 % | RESPIRATION RATE: 18 BRPM | BODY MASS INDEX: 34.02 KG/M2 | WEIGHT: 243 LBS | HEIGHT: 71 IN | DIASTOLIC BLOOD PRESSURE: 68 MMHG | HEART RATE: 69 BPM | SYSTOLIC BLOOD PRESSURE: 128 MMHG | TEMPERATURE: 97.4 F

## 2025-04-01 DIAGNOSIS — Z09 HOSPITAL DISCHARGE FOLLOW-UP: Primary | ICD-10-CM

## 2025-04-01 DIAGNOSIS — G89.18 POSTOPERATIVE PAIN: ICD-10-CM

## 2025-04-01 RX ORDER — CHOLECALCIFEROL (VITAMIN D3) 25 MCG
1000 TABLET ORAL DAILY
COMMUNITY

## 2025-04-01 RX ORDER — METOPROLOL TARTRATE 25 MG/1
25 TABLET, FILM COATED ORAL DAILY
COMMUNITY

## 2025-04-01 RX ORDER — HYDROCODONE BITARTRATE AND ACETAMINOPHEN 7.5; 325 MG/1; MG/1
1 TABLET ORAL EVERY 6 HOURS PRN
Qty: 21 TABLET | Refills: 0 | Status: SHIPPED | OUTPATIENT
Start: 2025-04-01

## 2025-04-01 RX ORDER — MAGNESIUM 200 MG
1 TABLET ORAL DAILY
Qty: 30 EACH | Refills: 0 | Status: SHIPPED | OUTPATIENT
Start: 2025-04-01

## 2025-04-01 RX ORDER — PIOGLITAZONE 30 MG/1
TABLET ORAL
COMMUNITY

## 2025-04-01 NOTE — PROGRESS NOTES
Transitional Care Follow Up Visit  Subjective     Marco Antonio Kendrick is a 74 y.o. male who presents for a transitional care management visit.    Within 48 business hours after discharge our office contacted him via telephone to coordinate his care and needs.      I reviewed and discussed the details of that call along with the discharge summary, hospital problems, inpatient lab results, inpatient diagnostic studies, and consultation reports with Marco Antonio.     Current outpatient and discharge medications have been reconciled for the patient.  Reviewed by: Veronica Cooper MD          3/27/2025    10:52 AM   Date of TCM Phone Call   [facility]   Date of Discharge 3/27/2025   Discharge Disposition Home or Self Care     Risk for Readmission (LACE) No data recorded    History of Present Illness   Course During Hospital Stay: Patient is here for hospital follow-up visit.  He recently underwent intramedullary nailing of the left tibia due to a closed displaced oblique fracture of the left tibia shaft secondary to a fall.  Surgery was uneventful.  Patient recovered nicely and was discharged to rehab.  He tolerated rehab therapy for few weeks and then discharged last week Thursday.  Still undergoing home PT/OT weekly and reports significant improvement in daily function activities.  He is not weightbearing yet but is able to walk around with a walker and wheelchair.  He is scheduled to follow-up with orthopedic by the end of the month.  He reports still experiencing intermittent spasmic pain in in the right leg mostly at nighttime and requesting refill for pain meds.     The following portions of the patient's history were reviewed and updated as appropriate: allergies, current medications, past family history, past medical history, past social history, past surgical history, and problem list.    Review of Systems    Objective   /68 (BP Location: Right arm, Patient Position: Sitting, Cuff Size: Adult)    "Pulse 69   Temp 97.4 °F (36.3 °C) (Temporal)   Resp 18   Ht 180.3 cm (70.98\")   Wt 110 kg (243 lb)   SpO2 96%   BMI 33.91 kg/m²   Physical Exam  Constitutional:       Comments: Elderly man in a wheelchair in no acute distress   Cardiovascular:      Heart sounds: Normal heart sounds.   Pulmonary:      Breath sounds: Normal breath sounds.   Musculoskeletal:      Right lower leg: Normal.      Comments: Left leg covered in cast.  Toes normal with good movement.   Neurological:      Mental Status: He is alert and oriented to person, place, and time.         Assessment & Plan   Diagnoses and all orders for this visit:    1. Hospital discharge follow-up (Primary)    2. Postoperative pain  -     HYDROcodone-acetaminophen (NORCO) 7.5-325 MG per tablet; Take 1 tablet by mouth Every 6 (Six) Hours As Needed for Moderate Pain.  Dispense: 21 tablet; Refill: 0    Other orders  -     Magnesium 200 MG tablet; Take 1 tablet by mouth Daily.  Dispense: 30 each; Refill: 0                   "

## 2025-04-02 ENCOUNTER — PATIENT OUTREACH (OUTPATIENT)
Dept: CASE MANAGEMENT | Facility: OTHER | Age: 75
End: 2025-04-02
Payer: MEDICARE

## 2025-04-02 ENCOUNTER — TELEPHONE (OUTPATIENT)
Dept: CARDIOLOGY | Age: 75
End: 2025-04-02
Payer: MEDICARE

## 2025-04-02 NOTE — OUTREACH NOTE
AMBULATORY CASE MANAGEMENT NOTE    Names and Relationships of Patient/Support Persons: Contact: Carmelita Kendrick; Relationship: Emergency Contact -     Patient Outreach  RN-ACM outreach with patient's spouse following Call Center Handoff. Discussed 2/24/25 to 2/27/25 hospitalization regarding closed displace oblique fracture of shaft of left tibia. Patient discharged to rehab and returned home on 3/27/25. Spouse states patient currently receiving [facility] services with 4/2/25 visit. Spouse states patient has cast to left leg and is NWB. Spouse states patient is using wheelchair and able to pivot using right leg. Spouse states patient is compliant with medications and medical appointments (had 4/2/25 PCP appointment). Spouse states patient has difficulty sleeping and no difficulty with fever; chest pain; SOB or appetite. Reviewed with spouse education; role of RN-ACM and HRCM case management services. Spouse verbalized understanding. Spouse states to appreciate outreach and declines needs for further outreach at this time. No further questions voiced at this time.   Adult Patient Profile  Questions/Answers      Flowsheet Row Most Recent Value   Symptoms/Conditions Managed at Home other (see comments)  [Closed displaced oblique fracture of shaft of left tibia]   Barriers to Taking Medication as Prescribed none   Equipment Currently Used at Home wheelchair   Primary Source of Support/Comfort spouse   People in Home spouse        Social Work Assessment  Questions/Answers      Flowsheet Row Most Recent Value   People in Home spouse   Functional Status Comments Spouse states patient to be independent with ADL's,  using wheelchair and pivots to wheelchair as patient is NWB on left leg (with cast) and pivots on right leg,  receiving assistance with transportation from spouse.   Equipment Currently Used at Home wheelchair        Send Education  Questions/Answers      Flowsheet Row Most Recent Value   Other  Patient Education/Resources  24/7 [facility] Nurse Call Line   24/7 Nurse Call Line Education Method Verbal        SDOH updated and reviewed with the patient during this program:  --     Food Insecurity: No Food Insecurity (4/2/2025)    Hunger Vital Sign     Worried About Running Out of Food in the Last Year: Never true     Ran Out of Food in the Last Year: Never true      --     Transportation Needs: No Transportation Needs (4/2/2025)    PRAPARE - Transportation     Lack of Transportation (Medical): No     Lack of Transportation (Non-Medical): No       Education Documentation  Unresolved/Worsening Symptoms, taught by Nazia Kirkpatrick, RN at 4/2/2025  5:28 PM.  Learner: Family  Readiness: Acceptance  Method: Explanation  Response: Verbalizes Understanding    Unresolved/Worsening Symptoms, taught by Nazia Kirkpatrick RN at 4/2/2025  5:28 PM.  Learner: Family  Readiness: Acceptance  Method: Explanation  Response: Verbalizes Understanding    Energy Conservation, taught by Nazia Kirkpatrick, RN at 4/2/2025  5:28 PM.  Learner: Family  Readiness: Acceptance  Method: Explanation  Response: Verbalizes Understanding    Assistive/Adaptive Devices, taught by Nazia Kirkpatrick, RN at 4/2/2025  5:28 PM.  Learner: Family  Readiness: Acceptance  Method: Explanation  Response: Verbalizes Understanding          Nazia OROURKE  Ambulatory Case Management    4/2/2025, 17:28 EDT

## 2025-04-02 NOTE — TELEPHONE ENCOUNTER
Caller: Carmelita Kendrick    Relationship to patient: Emergency Contact    Best call back number: [phone]    Patient is needing: PT'S PACEMAKER HOME MONITOR HAS NOT WORKED SINCE SOMETIME LAST SUMMER. PT DOES NOT HAVE A PHONE NUMBER TO CONTACT [employer] TO TROUBLESHOOT THE ISSUE OR SEND NEW MONITOR OUT. PLEASE CALL PT TO SEE WHAT CAN BE DONE TO HELP GET A WORKING MONITOR.

## 2025-04-04 ENCOUNTER — TELEPHONE (OUTPATIENT)
Dept: FAMILY MEDICINE CLINIC | Facility: CLINIC | Age: 75
End: 2025-04-04

## 2025-04-04 NOTE — TELEPHONE ENCOUNTER
Caller: ISAEL    Relationship: CARETITO    Best call back number: [phone]    What orders are you requesting (i.e. lab or imaging): VERBAL ORDERS    Additional notes: ISAEL FROM [facility] IS REQUESTING VERBAL ORDERS FOR OCCUPATIONAL THERAPY ONCE A WEEK FOR 2 WEEKS EFFECTIVE WEEK OF 4/28/25.

## 2025-04-16 ENCOUNTER — TELEPHONE (OUTPATIENT)
Dept: CARDIOLOGY | Age: 75
End: 2025-04-16

## 2025-04-16 NOTE — TELEPHONE ENCOUNTER
Caller: Carmelita Kendrick    Relationship to patient: Emergency Contact    Best call back number: [phone]    Patient is needing: SOMEONE CALLED PT TO LET HIM KNOW HE HAD MISSED A REMOTE DEVICE CHECK. PT HAS BEEN IN REHAB AND HAD TO REQUEST A NEW HOME MONITOR. PLEASE CALL TO [facility].

## 2025-04-29 ENCOUNTER — TELEPHONE (OUTPATIENT)
Dept: FAMILY MEDICINE CLINIC | Facility: CLINIC | Age: 75
End: 2025-04-29

## 2025-04-29 NOTE — TELEPHONE ENCOUNTER
Caller: ISAEL KATHLEEN/ NATALI TENDERS    Relationship to patient: Caregiver (non-relative)    Best call back number: [phone]    Patient is needing: CALLING TO ADVISE THAT PATIENT HAS BEEN D/C FROM HOME HEALTH OCCUPATIONAL THERAPY

## 2025-05-16 ENCOUNTER — OFFICE VISIT (OUTPATIENT)
Dept: FAMILY MEDICINE CLINIC | Facility: CLINIC | Age: 75
End: 2025-05-16
Payer: MEDICARE

## 2025-05-16 VITALS
DIASTOLIC BLOOD PRESSURE: 74 MMHG | HEART RATE: 90 BPM | HEIGHT: 70 IN | TEMPERATURE: 97.7 F | WEIGHT: 248.2 LBS | RESPIRATION RATE: 16 BRPM | OXYGEN SATURATION: 95 % | BODY MASS INDEX: 35.53 KG/M2 | SYSTOLIC BLOOD PRESSURE: 140 MMHG

## 2025-05-16 DIAGNOSIS — E66.01 CLASS 2 SEVERE OBESITY DUE TO EXCESS CALORIES WITH SERIOUS COMORBIDITY AND BODY MASS INDEX (BMI) OF 35.0 TO 35.9 IN ADULT: ICD-10-CM

## 2025-05-16 DIAGNOSIS — I10 PRIMARY HYPERTENSION: ICD-10-CM

## 2025-05-16 DIAGNOSIS — E78.5 HYPERLIPIDEMIA, UNSPECIFIED HYPERLIPIDEMIA TYPE: ICD-10-CM

## 2025-05-16 DIAGNOSIS — E11.9 TYPE 2 DIABETES MELLITUS WITHOUT COMPLICATION, WITHOUT LONG-TERM CURRENT USE OF INSULIN: ICD-10-CM

## 2025-05-16 DIAGNOSIS — E66.812 CLASS 2 SEVERE OBESITY DUE TO EXCESS CALORIES WITH SERIOUS COMORBIDITY AND BODY MASS INDEX (BMI) OF 35.0 TO 35.9 IN ADULT: ICD-10-CM

## 2025-05-16 DIAGNOSIS — Z00.00 MEDICARE ANNUAL WELLNESS VISIT, SUBSEQUENT: Primary | ICD-10-CM

## 2025-05-16 DIAGNOSIS — Z91.81 AT MODERATE RISK FOR FALL: ICD-10-CM

## 2025-05-16 DIAGNOSIS — E03.9 HYPOTHYROIDISM, UNSPECIFIED TYPE: ICD-10-CM

## 2025-05-16 DIAGNOSIS — Z23 IMMUNIZATION DUE: ICD-10-CM

## 2025-05-16 RX ORDER — ROSUVASTATIN CALCIUM 20 MG/1
20 TABLET, COATED ORAL DAILY
Qty: 90 TABLET | Refills: 1 | Status: SHIPPED | OUTPATIENT
Start: 2025-05-16

## 2025-05-16 NOTE — PROGRESS NOTES
Subjective   The ABCs of the Annual Wellness Visit  Medicare Wellness Visit      Marco Antonio Kendrick is a 74 y.o. patient who presents for a Medicare Wellness Visit.    The following portions of the patient's history were reviewed and   updated as appropriate: allergies, current medications, past family history, past medical history, past social history, past surgical history, and problem list.    Compared to one year ago, the patient's physical   health is the same.  Compared to one year ago, the patient's mental   health is better.    Recent Hospitalizations:  This patient has had a [facility] admission record on file within the last 365 days.  Current Medical Providers:  Patient Care Team:  Veronica Cooper MD as PCP - General (Internal Medicine)  Leonel Cage MD as Consulting Physician (Gastroenterology)  Leonel Cage MD as Referring Physician (Gastroenterology)  Kev Latham II, MD as Consulting Physician (Hematology and Oncology)    Outpatient Medications Prior to Visit   Medication Sig Dispense Refill    amLODIPine (NORVASC) 5 MG tablet Take 1 tablet by mouth Daily. 90 tablet 01    aspirin 81 MG chewable tablet Chew 1 tablet Daily.      benazepril (LOTENSIN) 10 MG tablet TAKE 1 TABLET EVERY DAY 90 tablet 3    Cholecalciferol (VITAMIN D PO) Take  by mouth.      cholecalciferol (Vitamin D-1000 Max St) 25 MCG (1000 UT) tablet Take 1 tablet by mouth Daily.      Enoxaparin Sodium (LOVENOX) 40 MG/0.4ML solution prefilled syringe syringe Inject 0.4 mL under the skin into the appropriate area as directed Daily. Indications: Post Surgical - Other 4 mL 0    HYDROcodone-acetaminophen (NORCO) 7.5-325 MG per tablet Take 1 tablet by mouth Every 6 (Six) Hours As Needed for Moderate Pain. 21 tablet 0    levothyroxine (SYNTHROID, LEVOTHROID) 200 MCG tablet TAKE 1 TABLET EVERY DAY 90 tablet 3    Magnesium 200 MG tablet Take 1 tablet by mouth Daily. 30 each 0    metoprolol tartrate (LOPRESSOR) 25 MG  "tablet Take 1 tablet by mouth Daily.      pioglitazone (ACTOS) 30 MG tablet       sertraline (ZOLOFT) 100 MG tablet Take 1.5 tablets by mouth Daily. 135 tablet 3    vitamin B-12 (CYANOCOBALAMIN) 1000 MCG tablet Take 1 tablet by mouth Daily.       No facility-administered medications prior to visit.     Opioid medication/s are on active medication list.  and I have evaluated his active treatment plan and pain score trends (see table).  Vitals:    05/16/25 1359   PainSc: 0-No pain     I have reviewed the chart for potential of high risk medication and harmful drug interactions in the elderly.        Aspirin is on active medication list. Aspirin use is indicated based on review of current medical condition/s. Pros and cons of this therapy have been discussed today. Benefits of this medication outweigh potential harm.  Patient has been encouraged to continue taking this medication.  .      Patient Active Problem List   Diagnosis    Cancer of sigmoid colon    Sick sinus syndrome    Primary hypertension    History of permanent cardiac pacemaker placement    Hypothyroidism    Type 2 diabetes mellitus without complication    Hyperlipidemia    Family history of colon cancer    Screening PSA (prostate specific antigen)    Class 2 severe obesity due to excess calories with serious comorbidity and body mass index (BMI) of 36.0 to 36.9 in adult    Closed displaced oblique fracture of shaft of left tibia    Displaced fracture of left fibula    Type 2 diabetes mellitus with hyperglycemia, without long-term current use of insulin     Advance Care Planning Advance Directive is not on file.  ACP discussion was held with the patient during this visit. Patient does not have an advance directive, information provided.            Objective   Vitals:    05/16/25 1359   BP: 140/74   Pulse: 90   Resp: 16   Temp: 97.7 °F (36.5 °C)   TempSrc: Oral   SpO2: 95%   Weight: 113 kg (248 lb 3.2 oz)   Height: 177.8 cm (70\")   PainSc: 0-No pain " "      Estimated body mass index is 35.61 kg/m² as calculated from the following:    Height as of this encounter: 177.8 cm (70\").    Weight as of this encounter: 113 kg (248 lb 3.2 oz).                Does the patient have evidence of cognitive impairment? No  Lab Results   Component Value Date    CHLPL 132 2025    TRIG 120 2025    HDL 43 2025    LDL 67 2025    VLDL 22 2025    HGBA1C 6.40 (H) 2025    HGBA1C 6.30 (H) 2025                                                                                                Health  Risk Assessment    Smoking Status:  Social History     Tobacco Use   Smoking Status Never    Passive exposure: Never   Smokeless Tobacco Never   Tobacco Comments    caffeine use     Alcohol Consumption:  Social History     Substance and Sexual Activity   Alcohol Use Never    Comment: occasional       Fall Risk Screen  STEADI Fall Risk Assessment was completed, and patient is at MODERATE risk for falls. Assessment completed on:2025    Depression Screening   Little interest or pleasure in doing things? Not at all   Feeling down, depressed, or hopeless? Not at all   PHQ-2 Total Score 0      Health Habits and Functional and Cognitive Screenin/16/2025     2:05 PM   Functional & Cognitive Status   Do you have difficulty preparing food and eating? No   Do you have difficulty bathing yourself, getting dressed or grooming yourself? No   Do you have difficulty using the toilet? No   Do you have difficulty moving around from place to place? No   Do you have trouble with steps or getting out of a bed or a chair? No   Current Diet Well Balanced Diet   Dental Exam Up to date   Eye Exam Up to date   Exercise (times per week) 0 times per week   Current Exercises Include No Regular Exercise   Do you need help using the phone?  No   Are you deaf or do you have serious difficulty hearing?  Yes   Do you need help to go to places out of walking distance? No   Do " you need help shopping? No   Do you need help preparing meals?  No   Do you need help with housework?  No   Do you need help with laundry? No   Do you need help taking your medications? No   Do you need help managing money? No   Do you ever drive or ride in a car without wearing a seat belt? No   Have you felt unusual stress, anger or loneliness in the last month? No   Who do you live with? Spouse   If you need help, do you have trouble finding someone available to you? No   Have you been bothered in the last four weeks by sexual problems? No   Do you have difficulty concentrating, remembering or making decisions? No           Age-appropriate Screening Schedule:  Refer to the list below for future screening recommendations based on patient's age, sex and/or medical conditions. Orders for these recommended tests are listed in the plan section. The patient has been provided with a written plan.    Health Maintenance List  Health Maintenance   Topic Date Due    TDAP/TD VACCINES (1 - Tdap) Never done    ZOSTER VACCINE (1 of 2) Never done    RSV Vaccine - Adults (1 - 1-dose 75+ series) Never done    URINE MICROALBUMIN-CREATININE RATIO (uACR)  08/09/2024    DIABETIC FOOT EXAM  08/17/2024    COVID-19 Vaccine (3 - 2024-25 season) 09/01/2024    ANNUAL WELLNESS VISIT  02/21/2025    INFLUENZA VACCINE  07/01/2025    HEMOGLOBIN A1C  11/09/2025    DIABETIC EYE EXAM  11/20/2025    LIPID PANEL  05/09/2026    HEPATITIS C SCREENING  Completed    Pneumococcal Vaccine 50+  Completed    COLONOSCOPY  Discontinued                                                                                                                                                CMS Preventative Services Quick Reference  Risk Factors Identified During Encounter  Immunizations Discussed/Encouraged: Tdap, Shingrix, and RSV (Respiratory Syncytial Virus)    The above risks/problems have been discussed with the patient.  Pertinent information has been shared with the  "patient in the After Visit Summary.  An After Visit Summary and PPPS were made available to the patient.    Follow Up:   Next Medicare Wellness visit to be scheduled in 1 year.         Additional E&M Note during same encounter follows:  Patient has additional, significant, and separately identifiable condition(s)/problem(s) that require work above and beyond the Medicare Wellness Visit     Chief Complaint  Medicare Wellness-subsequent    Subjective    HPI  Marco Antonio is also being seen today for additional medical problem/s.       The patient is a 74-year-old male who presents today for a wellness visit and routine follow-up.    He reports no new complaints or concerns since his last hospital follow-up, during which he underwent a surgery for left fibular fracture. He still experiences some soreness in his foot and expresses a desire to discontinue the use of his boot but he is to undergoing rehabilitation.     His blood pressure has been well-controlled since his last visit, although he does not monitor it at home. He is on amlodipine 5 mg, benazepril 10 mg, and metoprolol 25 mg daily for blood pressure control.    He is on levothyroxine 200 mcg for thyroid management.    He is unsure about his current diabetes medication regimen but recalls taking pioglitazone in the past.  He was on metformin, pioglitazone, rosuvastatin and Ozempic prior to hospitalization but was discontinued before discharge.  Reason not known or documented.  He was prescribed Ozempic in 11/2024 for weight management and diabetes control but did not take it.            Objective   Vital Signs:  /74   Pulse 90   Temp 97.7 °F (36.5 °C) (Oral)   Resp 16   Ht 177.8 cm (70\")   Wt 113 kg (248 lb 3.2 oz)   SpO2 95%   BMI 35.61 kg/m²   Physical Exam  Constitutional:       Appearance: Normal appearance.   HENT:      Head: Normocephalic and atraumatic.   Cardiovascular:      Heart sounds: Normal heart sounds.   Pulmonary:      Breath sounds: " Normal breath sounds.   Neurological:      Mental Status: He is alert and oriented to person, place, and time.   Psychiatric:         Mood and Affect: Mood normal.               The following data was reviewed by: Veronica Cooper MD on 05/16/2025:    Common labs          2/26/2025    05:20 2/27/2025    04:47 5/9/2025    09:04   Common Labs   Glucose 185  120  153    BUN 17  20  13    Creatinine 1.09  1.12  0.99    Sodium 136  140  143    Potassium 4.3  4.1  3.8    Chloride 103  105  104    Calcium 8.7  8.7  9.1    WBC 8.77  7.30     Hemoglobin 10.2  10.2     Hematocrit 32.4  30.8     Platelets 142  135     Total Cholesterol   132    Triglycerides   120    HDL Cholesterol   43    LDL Cholesterol    67    Hemoglobin A1C   6.40      TSH          9/16/2024    15:20 10/23/2024    10:34 5/9/2025    09:04   TSH   TSH 6.420  4.540  1.520        Results  Labs   - LDL cholesterol: Below 70   - TSH: Improved to normal   - Free T4: Within normal range   - A1c: 6.4           Assessment and Plan        1. Wellness visit.  - He is advised to receive the Shingrix vaccine, a two-dose series, and the RSV vaccine at his local pharmacy.  - The Tdap vaccine will be administered today.  - He is encouraged to maintain a healthy diet, engage in physical activity as much as possible, and continue with physical therapy.  - Advise follow-up annually for wellness exams.    2. Hypertension.  - His blood pressure reading today is 140/74, with the systolic value slightly above the target goal of <130/80, but the diastolic value remains within the desired range.  - He will continue his current regimen of amlodipine 5 mg, benazepril, and metoprolol 25 mg daily for blood pressure control.  - He is advised to monitor his blood pressure at home a few times a week.  - Blood pressure will be reassessed in 6 months    3. Hyperlipidemia.  - He will continue with dietary management and resume Crestor 20 mg daily.  - Labs will be monitored regularly.  -  Cholesterol levels will be reassessed in 6 months    4. Hypothyroidism.  - His thyroid function test results have improved significantly, with a decrease in TSH levels and free T4 within the normal range.  - He will continue on his current dose of levothyroxine 200 mcg daily.  - Prescription Drug Monitoring Program was reviewed.  - Labs will be monitored regularly.    5. Diabetes mellitus.  - His A1c levels have remained stable around 6 and 6.4, indicating good control as he is maintaining the target goal of <7.  - He will discontinue pioglitazone and start Ozempic (semaglutide) 0.25 mg subcutaneously every week for 4 weeks, then increase the dose to 0.5 mg weekly.  - He will continue with metformin 500 mg twice daily.  - A urine microalbumin/creatinine ratio will be conducted to ensure there is no protein leakage in his urine.    Follow-up  - The patient will follow up in 6 months with repeat fasting labs.         Follow Up   No follow-ups on file.  Patient was given instructions and counseling regarding his condition or for health maintenance advice. Please see specific information pulled into the AVS if appropriate.  Patient or patient representative verbalized consent for the use of Ambient Listening during the visit with  Veronica Cooper MD for chart documentation. 5/16/2025  14:36 EDT

## 2025-05-16 NOTE — PATIENT INSTRUCTIONS
Advance Care Planning and Advance Directives     You make decisions on a daily basis - decisions about where you want to live, your career, your home, your life. Perhaps one of the most important decisions you face is your choice for future medical care. Take time to talk with your family and your healthcare team and start planning today.  Advance Care Planning is a process that can help you:  Understand possible future healthcare decisions in light of your own experiences  Reflect on those decision in light of your goals and values  Discuss your decisions with those closest to you and the healthcare professionals that care for you  Make a plan by creating a document that reflects your wishes    Surrogate Decision Maker  In the event of a medical emergency, which has left you unable to communicate or to make your own decisions, you would need someone to make decisions for you.  It is important to discuss your preferences for medical treatment with this person while you are in good health.     Qualities of a surrogate decision maker:  Willing to take on this role and responsibility  Knows what you want for future medical care  Willing to follow your wishes even if they don't agree with them  Able to make difficult medical decisions under stressful circumstances    Advance Directives  These are legal documents you can create that will guide your healthcare team and decision maker(s) when needed. These documents can be stored in the electronic medical record.    Living Will - a legal document to guide your care if you have a terminal condition or a serious illness and are unable to communicate. States vary by statute in document names/types, but most forms may include one or more of the following:        -  Directions regarding life-prolonging treatments        -  Directions regarding artificially provided nutrition/hydration        -  Choosing a healthcare decision maker        -  Direction regarding organ/tissue  donation    Durable Power of  for Healthcare - this document names an -in-fact to make medical decisions for you, but it may also allow this person to make personal and financial decisions for you. Please seek the advice of an  if you need this type of document.    **Advance Directives are not required and no one may discriminate against you if you do not sign one.    Medical Orders  Many states allow specific forms/orders signed by your physician to record your wishes for medical treatment in your current state of health. This form, signed in personal communication with your physician, addresses resuscitation and other medical interventions that you may or may not want.      For more information or to schedule a time with a [facility] Advance Care Planning Facilitator contact: [facility] or call [phone] and someone will contact you directly.  Fall Prevention in the Home, Adult  Falls can cause injuries and affect people of all ages. There are many simple things that you can do to make your home safe and to help prevent falls.  If you need it, ask for help making these changes.  What actions can I take to prevent falls?  General information  Use good lighting in all rooms. Make sure to:  Replace any light bulbs that burn out.  Turn on lights if it is dark and use night-lights.  Keep items that you use often in easy-to-reach places. Lower the shelves around your home if needed.  Move furniture so that there are clear paths around it.  Do not keep throw rugs or other things on the floor that can make you trip.  If any of your floors are uneven, fix them.  Add color or contrast paint or tape to clearly jarrett and help you see:  Grab bars or handrails.  First and last steps of staircases.  Where the edge of each step is.  If you use a ladder or stepladder:  Make sure that it is fully opened. Do not climb a closed ladder.  Make sure the sides of the ladder are locked in  place.  Have someone hold the ladder while you use it.  Know where your pets are as you move through your home.  What can I do in the bathroom?         Keep the floor dry. Clean up any water that is on the floor right away.  Remove soap buildup in the bathtub or shower. Buildup makes bathtubs and showers slippery.  Use non-skid mats or decals on the floor of the bathtub or shower.  Attach bath mats securely with double-sided, non-slip rug tape.  If you need to sit down while you are in the shower, use a non-slip stool.  Install grab bars by the toilet and in the bathtub and shower. Do not use towel bars as grab bars.  What can I do in the bedroom?  Make sure that you have a light by your bed that is easy to reach.  Do not use any sheets or blankets on your bed that hang to the floor.  Have a firm bench or chair with side arms that you can use for support when you get dressed.  What can I do in the kitchen?  Clean up any spills right away.  If you need to reach something above you, use a sturdy step stool that has a grab bar.  Keep electrical cables out of the way.  Do not use floor polish or wax that makes floors slippery.  What can I do with my stairs?  Do not leave anything on the stairs.  Make sure that you have a light switch at the top and the bottom of the stairs. Have them installed if you do not have them.  Make sure that there are handrails on both sides of the stairs. Fix handrails that are broken or loose. Make sure that handrails are as long as the staircases.  Install non-slip stair treads on all stairs in your home if they do not have carpet.  Avoid having throw rugs at the top or bottom of stairs, or secure the rugs with carpet tape to prevent them from moving.  Choose a carpet design that does not hide the edge of steps on the stairs. Make sure that carpet is firmly attached to the stairs. Fix any carpet that is loose or worn.  What can I do on the outside of my home?  Use bright outdoor  lighting.  Repair the edges of walkways and driveways and fix any cracks. Clear paths of anything that can make you trip, such as tools or rocks.  Add color or contrast paint or tape to clearly jarrett and help you see high doorway thresholds.  Trim any bushes or trees on the main path into your home.  Check that handrails are securely fastened and in good repair. Both sides of all steps should have handrails.  Install guardrails along the edges of any raised decks or porches.  Have leaves, snow, and ice cleared regularly. Use sand, salt, or ice melt on walkways during winter months if you live where there is ice and snow.  In the garage, clean up any spills right away, including grease or oil spills.  What other actions can I take?  Review your medicines with your health care provider. Some medicines can make you confused or feel dizzy. This can increase your chance of falling.  Wear closed-toe shoes that fit well and support your feet. Wear shoes that have rubber soles and low heels.  Use a cane, walker, scooter, or crutches that help you move around if needed.  Talk with your provider about other ways that you can decrease your risk of falls. This may include seeing a physical therapist to learn to do exercises to improve movement and strength.  Where to find more information  Centers for Disease Control and PreventionSARAH: cdc.gov  National [place] on Aging: jerald.nih.gov  National [place] on Aging: jerald.nih.gov  Contact a health care provider if:  You are afraid of falling at home.  You feel weak, drowsy, or dizzy at home.  You fall at home.  Get help right away if you:  Lose consciousness or have trouble moving after a fall.  Have a fall that causes a head injury.  These symptoms may be an emergency. Get help right away. Call 911.  Do not wait to see if the symptoms will go away.  Do not drive yourself to the hospital.  This information is not intended to replace advice given to you by your health care  provider. Make sure you discuss any questions you have with your health care provider.  Document Revised: 08/21/2023 Document Reviewed: 08/21/2023  Elsevier Patient Education © 2024 [employer]  Sit-to-Stand Exercise    The sit-to-stand exercise (also known as the chair stand or chair rise exercise) strengthens your lower body and helps you maintain or improve your mobility and independence. The end goal is to do the sit-to-stand exercise without using your hands. This will be easier as you become stronger. You should always talk with your health care provider before starting any exercise program, especially if you have had recent surgery.  Do the exercise exactly as told by your health care provider and adjust it as directed. It is normal to feel mild stretching, pulling, tightness, or discomfort as you do this exercise, but you should stop right away if you feel sudden pain or your pain gets worse. Do not begin doing this exercise until told by your health care provider.  What the sit-to-stand exercise does  The sit-to-stand exercise helps to strengthen the muscles in your thighs and the muscles in the center of your body that give you stability (core muscles). This exercise is especially helpful if:  You have had knee or hip surgery.  You have trouble getting up from a chair, out of a car, or off the toilet due to muscle weakness.  How to do the sit-to-stand exercise  Sit toward the front edge of a sturdy chair without armrests. Your knees should be bent and your feet should be flat on the floor and shoulder-width apart and underneath your hips.  Place your hands lightly on each side of the seat. Keep your back and neck as straight as possible, with your chest slightly forward.  Breathe in slowly. Lean forward and slightly shift your weight to the front of your feet.  Breathe out as you slowly stand up. Try not to support any weight with your hands.  Stand and pause for a full breath in and out.  Breathe in as  you sit down slowly. Tighten your core and abdominal muscles to control your lowering as much as possible. You should lower yourself back to the chair slowly, not just drop back into the seat.  Breathe out slowly.  Do this exercise 10-15 times. If needed, do it fewer times until you build up strength.  Rest for 1 minute, then do another set of 10-15 repetitions.  To change the difficulty of the sit-to-stand exercise  If the exercise is too difficult, use a chair with sturdy armrests, and push off the armrests to help you come to the standing position. You can also use the armrests to help slowly lower yourself back to sitting. As this gets easier, try to use your arms less. You can also place a firm cushion or pillow on the chair to make the surface higher.  If this exercise is too easy, do not use your arms to help raise or lower yourself. You can also wear a weighted vest, use hand weights, increase your repetitions, or try a lower chair.  General tips  You may feel tired when starting an exercise routine. This is normal.  You may have muscle soreness that lasts a few days. This is normal. As you get stronger, you may not feel muscle soreness.  Use smooth, steady movements.  Do not  hold your breath during strength exercises. This can cause unsafe changes in your blood pressure.  Breathe in slowly through your nose, and breathe out slowly through your mouth.  Summary  Strengthening your lower body is an important step to help you move safely and independently.  The sit-to-stand exercise helps strengthen the muscles in your thighs and core.  You should always talk with your health care provider before starting any exercise program, especially if you have had recent surgery.  This information is not intended to replace advice given to you by your health care provider. Make sure you discuss any questions you have with your health care provider.  Document Revised: 04/10/2022 Document Reviewed: 04/10/2022  Stan  Patient Education ©  [employer]  You are due for adacel Tdap vaccination. (provides protection against tetanus, diptheria and whooping cough) Please  get the immunization at your local pharmacy at your earliest convenience.  Please click on the link for more information about this vaccine.    https://www.cdc.gov/vaccines/vpd/dtap-tdap-td/public/index.html    You are due for Shingrix vaccination series ( the newest shingles vaccine).  It is a two shot series spaced 2-6 months apart. Please get this vaccine series started at your earliest convenience at your local pharmacy to help avoid shingles outbreak. It is more effective than the old Zostavax vaccine and is recommended even if you have had the Zostavax vaccine in the past.  Once the Shingrix series is completed, it does not need to be repeated.   For more information, please look at the website below:  https://www.cdc.gov/vaccines/vpd/shingles/public/shingrix/index.html    You are due for RSV vaccination. (provides protection against Respiratory Syncytial Virus Infection) Please  get the immunization at your local pharmacy at your earliest convenience. This immunization is currently a one time vaccine only. Please click on the link for more information about this vaccine.   https://www.cdc.gov/rsv/vaccines/older-adults.html    You are due for a Covid 19 vaccination. (provides protection against Covid 19 Viral Infection) Please  talk to your pharmacist and get the immunization at your local pharmacy at your earliest convenience. Please click on the link for more information about this vaccine.   https://www.cdc.gov/coronavirus/2019-ncov/vaccines/stay-up-to-date.html        Medicare Wellness  Personal Prevention Plan of Service     Date of Office Visit:    Encounter Provider:  Veronica Cooper MD  Place of Service:  [facility] PRIMARY CARE  Patient Name: Marco Antonio Kendrick  :  1950    As part of the Medicare Wellness portion of your  visit today, we are providing you with this personalized preventive plan of services (PPPS). This plan is based upon recommendations of the [place] Preventive Services Task Force (USPSTF) and the Advisory Committee on Immunization Practices (ACIP).    This lists the preventive care services that should be considered, and provides dates of when you are due. Items listed as completed are up-to-date and do not require any further intervention.    Health Maintenance   Topic Date Due   • TDAP/TD VACCINES (1 - Tdap) Never done   • ZOSTER VACCINE (1 of 2) Never done   • RSV Vaccine - Adults (1 - 1-dose 75+ series) Never done   • URINE MICROALBUMIN-CREATININE RATIO (uACR)  08/09/2024   • DIABETIC FOOT EXAM  08/17/2024   • COVID-19 Vaccine (3 - 2024-25 season) 09/01/2024   • ANNUAL WELLNESS VISIT  02/21/2025   • INFLUENZA VACCINE  07/01/2025   • HEMOGLOBIN A1C  11/09/2025   • DIABETIC EYE EXAM  11/20/2025   • LIPID PANEL  05/09/2026   • HEPATITIS C SCREENING  Completed   • Pneumococcal Vaccine 50+  Completed   • COLONOSCOPY  Discontinued       No orders of the defined types were placed in this encounter.      No follow-ups on file.

## 2025-05-17 LAB
ALBUMIN/CREAT UR: <4 MG/G CREAT (ref 0–29)
CREAT UR-MCNC: 67.6 MG/DL
MICROALBUMIN UR-MCNC: <3 UG/ML

## 2025-06-30 ENCOUNTER — OFFICE VISIT (OUTPATIENT)
Age: 75
End: 2025-06-30
Payer: MEDICARE

## 2025-06-30 VITALS
DIASTOLIC BLOOD PRESSURE: 88 MMHG | SYSTOLIC BLOOD PRESSURE: 134 MMHG | HEIGHT: 70 IN | HEART RATE: 70 BPM | WEIGHT: 240 LBS | BODY MASS INDEX: 34.36 KG/M2

## 2025-06-30 DIAGNOSIS — I10 PRIMARY HYPERTENSION: ICD-10-CM

## 2025-06-30 DIAGNOSIS — Z95.0 HISTORY OF PERMANENT CARDIAC PACEMAKER PLACEMENT: ICD-10-CM

## 2025-06-30 DIAGNOSIS — I49.5 SICK SINUS SYNDROME: Primary | ICD-10-CM

## 2025-06-30 PROCEDURE — 93000 ELECTROCARDIOGRAM COMPLETE: CPT | Performed by: INTERNAL MEDICINE

## 2025-06-30 PROCEDURE — 3079F DIAST BP 80-89 MM HG: CPT | Performed by: INTERNAL MEDICINE

## 2025-06-30 PROCEDURE — 99214 OFFICE O/P EST MOD 30 MIN: CPT | Performed by: INTERNAL MEDICINE

## 2025-06-30 PROCEDURE — 3075F SYST BP GE 130 - 139MM HG: CPT | Performed by: INTERNAL MEDICINE

## 2025-06-30 NOTE — PROGRESS NOTES
Date of Office Visit: 2025  Encounter Provider: Bi Weldon MD  Place of Service: [facility] CARDIOLOGY  Patient Name: Marco Antonio Kendrick  : 1950    Subjective:     Encounter Date:2025      Patient ID: Marco Antonio Kendrick is a 74 y.o. male who has a cc of  SSS and he had an original pacer in . He paces in the A mostly. He used to see RL         The patient had a good year.   No anginal chest pain,   No sig [place],   No soa,   No fainting,  No orthostasis.   No edema.   Exercise tolerance: good     There have been no hospital admission since the last visit.     There have been no bleeding events.       Past Medical History:   Diagnosis Date    Arthritis     Bipolar disorder     Bladder wall thickening     MILD NON-SPECIFIC URINARY BLADDER....FOLLOWED BY DR COBURN    Bone marrow suppression     SIGNIFICANT MYELOSUPRESSION FROM FOLFOX-4 CHEMOTHERAPY    Cancer of sigmoid colon     STAGE III (T3N1M0)    Depression     Disease of thyroid gland     HYPOTHYROIDISM    Diverticula of colon     Enlarged prostate     H/O allergy to eggs     Headache     Hyperlipidemia     Hypertension     Hyperthyroidism     Obesity     Pneumonia 2015    PONV (postoperative nausea and vomiting)     Sick sinus syndrome     S/P LEFT CHEST PACEMAKER IMPLANTATION    Splenomegaly        Social History     Socioeconomic History    Marital status:      Spouse name: Carmelita    Number of children: 2   Tobacco Use    Smoking status: Never     Passive exposure: Never    Smokeless tobacco: Never    Tobacco comments:     caffeine use   Vaping Use    Vaping status: Never Used   Substance and Sexual Activity    Alcohol use: Never     Comment: occasional    Drug use: No    Sexual activity: Defer       Family History   Problem Relation Age of Onset    Colon cancer Father     Melanoma Brother     Heart disease Other     Hypertension Other     Diabetes Other     Cancer Other     Stroke Other        Review of Systems  "  Constitutional: Negative for fever and night sweats.   HENT:  Negative for ear pain and stridor.    Eyes:  Negative for discharge and visual halos.   Cardiovascular:  Negative for cyanosis.   Respiratory:  Negative for hemoptysis and sputum production.    Hematologic/Lymphatic: Negative for adenopathy.   Skin:  Negative for nail changes and unusual hair distribution.   Musculoskeletal:  Positive for arthritis and joint pain. Negative for gout and joint swelling.   Gastrointestinal:  Negative for bowel incontinence and flatus.   Genitourinary:  Negative for dysuria and flank pain.   Neurological:  Negative for seizures and tremors.   Psychiatric/Behavioral:  Negative for altered mental status. The patient is not nervous/anxious.             Objective:     Vitals:    06/30/25 1057   BP: 134/88   BP Location: Left arm   Patient Position: Sitting   Cuff Size: Adult   Pulse: 70   Weight: 109 kg (240 lb)   Height: 177.8 cm (70\")         Eyes:      General:         Right eye: No discharge.         Left eye: No discharge.   HENT:      Head: Normocephalic and atraumatic.   Neck:      Thyroid: No thyromegaly.      Vascular: No JVD.   Pulmonary:      Effort: Pulmonary effort is normal.      Breath sounds: Normal breath sounds. No rales.   Cardiovascular:      Normal rate. Regular rhythm.      No gallop.    Edema:     Peripheral edema absent.   Abdominal:      General: Bowel sounds are normal.      Palpations: Abdomen is soft.      Tenderness: There is no abdominal tenderness.   Musculoskeletal: Normal range of motion.         General: No deformity. Skin:     General: Skin is warm and dry.      Findings: No erythema.   Neurological:      Mental Status: Alert and oriented to person, place, and time.      Motor: Normal muscle tone.   Psychiatric:         Behavior: Behavior normal.         Thought Content: Thought content normal.           ECG 12 Lead    Date/Time: 6/30/2025 11:43 AM  Performed by: Bi Weldon, " MD    Authorized by: Bi Weldon MD  Comparison: compared with previous ECG   Similar to previous ECG  Rhythm: sinus rhythm  Conduction: non-specific intraventricular conduction delay  Other findings: non-specific ST-T wave changes          Lab Review:       Assessment:          Diagnosis Plan   1. Sick sinus syndrome        2. History of permanent cardiac pacemaker placement        3. Primary hypertension               Plan:     I reviewed the pacemaker/ICD tracings and the pacing and sensing parameters are normal.  AF burden is o    He has an abandoned RA lead     HTN -- controlled

## 2025-07-07 RX ORDER — SERTRALINE HYDROCHLORIDE 100 MG/1
150 TABLET, FILM COATED ORAL DAILY
Qty: 135 TABLET | Refills: 3 | Status: SHIPPED | OUTPATIENT
Start: 2025-07-07

## 2025-07-07 NOTE — TELEPHONE ENCOUNTER
Caller: Carmelita Kendrick    Relationship: Emergency Contact    Best call back number: [phone]     Requested Prescriptions:   Requested Prescriptions     Pending Prescriptions Disp Refills    sertraline (ZOLOFT) 100 MG tablet 135 tablet 3     Sig: Take 1.5 tablets by mouth Daily.        Pharmacy where request should be sent: [facility] [id number] [id number] AT [facility] [address] [place] - [phone] [facility] [phone]      Last office visit with prescribing clinician: 5/16/2025   Last telemedicine visit with prescribing clinician: Visit date not found   Next office visit with prescribing clinician: 11/25/2025     Additional details provided by patient: PATIENT IS OUT OF THIS MEDICATION    Does the patient have less than a 3 day supply:  [x] Yes  [] No    Would you like a call back once the refill request has been completed: [] Yes [x] No    If the office needs to give you a call back, can they leave a voicemail: [] Yes [x] No    Elsa Lopez   07/07/25 14:23 EDT

## 2025-08-11 LAB
MC_CV_MDC_IDC_RATE_1: 180
MC_CV_MDC_IDC_ZONE_ID: 2
MDC_IDC_MSMT_BATTERY_REMAINING_LONGEVITY: 7 MO
MDC_IDC_MSMT_BATTERY_STATUS: NORMAL
MDC_IDC_MSMT_BATTERY_VOLTAGE: 2.68
MDC_IDC_MSMT_LEADCHNL_RA_DTM: NORMAL
MDC_IDC_MSMT_LEADCHNL_RA_IMPEDANCE_VALUE: 862
MDC_IDC_MSMT_LEADCHNL_RA_PACING_THRESHOLD_AMPLITUDE: 0.5
MDC_IDC_MSMT_LEADCHNL_RA_PACING_THRESHOLD_PULSEWIDTH: 0.4
MDC_IDC_MSMT_LEADCHNL_RV_DTM: NORMAL
MDC_IDC_MSMT_LEADCHNL_RV_IMPEDANCE_VALUE: 650
MDC_IDC_MSMT_LEADCHNL_RV_PACING_THRESHOLD_AMPLITUDE: 0.75
MDC_IDC_MSMT_LEADCHNL_RV_PACING_THRESHOLD_PULSEWIDTH: 0.4
MDC_IDC_PG_IMPLANT_DTM: NORMAL
MDC_IDC_PG_MFG: NORMAL
MDC_IDC_PG_MODEL: NORMAL
MDC_IDC_PG_SERIAL: NORMAL
MDC_IDC_PG_TYPE: NORMAL
MDC_IDC_SESS_DTM: NORMAL
MDC_IDC_SESS_TYPE: NORMAL
MDC_IDC_SET_BRADY_AT_MODE_SWITCH_RATE: 175
MDC_IDC_SET_BRADY_LOWRATE: 60
MDC_IDC_SET_BRADY_MAX_SENSOR_RATE: 130
MDC_IDC_SET_BRADY_MAX_TRACKING_RATE: 130
MDC_IDC_SET_BRADY_MODE: NORMAL
MDC_IDC_SET_BRADY_PAV_DELAY: 150
MDC_IDC_SET_BRADY_SAV_DELAY: 120
MDC_IDC_SET_LEADCHNL_RA_PACING_AMPLITUDE: 1.5
MDC_IDC_SET_LEADCHNL_RA_PACING_POLARITY: NORMAL
MDC_IDC_SET_LEADCHNL_RA_PACING_PULSEWIDTH: 0.4
MDC_IDC_SET_LEADCHNL_RA_SENSING_POLARITY: NORMAL
MDC_IDC_SET_LEADCHNL_RA_SENSING_SENSITIVITY: 0.5
MDC_IDC_SET_LEADCHNL_RV_PACING_AMPLITUDE: 2
MDC_IDC_SET_LEADCHNL_RV_PACING_POLARITY: NORMAL
MDC_IDC_SET_LEADCHNL_RV_PACING_PULSEWIDTH: 0.4
MDC_IDC_SET_LEADCHNL_RV_SENSING_POLARITY: NORMAL
MDC_IDC_SET_LEADCHNL_RV_SENSING_SENSITIVITY: 5.6
MDC_IDC_SET_ZONE_STATUS: NORMAL
MDC_IDC_SET_ZONE_TYPE: NORMAL
MDC_IDC_STAT_AT_BURDEN_PERCENT: 0
MDC_IDC_STAT_BRADY_RA_PERCENT_PACED: 82
MDC_IDC_STAT_BRADY_RV_PERCENT_PACED: 0

## 2025-08-22 LAB
MC_CV_MDC_IDC_RATE_1: 180
MC_CV_MDC_IDC_ZONE_ID: 2
MDC_IDC_MSMT_BATTERY_REMAINING_LONGEVITY: 7 MO
MDC_IDC_MSMT_BATTERY_STATUS: NORMAL
MDC_IDC_MSMT_BATTERY_VOLTAGE: 2.68
MDC_IDC_MSMT_LEADCHNL_RA_DTM: NORMAL
MDC_IDC_MSMT_LEADCHNL_RA_IMPEDANCE_VALUE: 862
MDC_IDC_MSMT_LEADCHNL_RA_PACING_THRESHOLD_AMPLITUDE: 0.5
MDC_IDC_MSMT_LEADCHNL_RA_PACING_THRESHOLD_PULSEWIDTH: 0.4
MDC_IDC_MSMT_LEADCHNL_RV_DTM: NORMAL
MDC_IDC_MSMT_LEADCHNL_RV_IMPEDANCE_VALUE: 650
MDC_IDC_MSMT_LEADCHNL_RV_PACING_THRESHOLD_AMPLITUDE: 0.75
MDC_IDC_MSMT_LEADCHNL_RV_PACING_THRESHOLD_PULSEWIDTH: 0.4
MDC_IDC_PG_IMPLANT_DTM: NORMAL
MDC_IDC_PG_MFG: NORMAL
MDC_IDC_PG_MODEL: NORMAL
MDC_IDC_PG_SERIAL: NORMAL
MDC_IDC_PG_TYPE: NORMAL
MDC_IDC_SESS_DTM: NORMAL
MDC_IDC_SESS_TYPE: NORMAL
MDC_IDC_SET_BRADY_AT_MODE_SWITCH_RATE: 175
MDC_IDC_SET_BRADY_LOWRATE: 60
MDC_IDC_SET_BRADY_MAX_SENSOR_RATE: 130
MDC_IDC_SET_BRADY_MAX_TRACKING_RATE: 130
MDC_IDC_SET_BRADY_MODE: NORMAL
MDC_IDC_SET_BRADY_PAV_DELAY: 150
MDC_IDC_SET_BRADY_SAV_DELAY: 120
MDC_IDC_SET_LEADCHNL_RA_PACING_AMPLITUDE: 1.5
MDC_IDC_SET_LEADCHNL_RA_PACING_POLARITY: NORMAL
MDC_IDC_SET_LEADCHNL_RA_PACING_PULSEWIDTH: 0.4
MDC_IDC_SET_LEADCHNL_RA_SENSING_POLARITY: NORMAL
MDC_IDC_SET_LEADCHNL_RA_SENSING_SENSITIVITY: 0.5
MDC_IDC_SET_LEADCHNL_RV_PACING_AMPLITUDE: 2
MDC_IDC_SET_LEADCHNL_RV_PACING_POLARITY: NORMAL
MDC_IDC_SET_LEADCHNL_RV_PACING_PULSEWIDTH: 0.4
MDC_IDC_SET_LEADCHNL_RV_SENSING_POLARITY: NORMAL
MDC_IDC_SET_LEADCHNL_RV_SENSING_SENSITIVITY: 5.6
MDC_IDC_SET_ZONE_STATUS: NORMAL
MDC_IDC_SET_ZONE_TYPE: NORMAL
MDC_IDC_STAT_AT_BURDEN_PERCENT: 0
MDC_IDC_STAT_BRADY_RA_PERCENT_PACED: 82
MDC_IDC_STAT_BRADY_RV_PERCENT_PACED: 0

## (undated) DEVICE — GLV SURG BIOGEL LTX PF 8

## (undated) DEVICE — TRAP FLD MINIVAC MEGADYNE 100ML

## (undated) DEVICE — GLV SURG PREMIERPRO ORTHO LTX PF SZ8 BRN

## (undated) DEVICE — THE TORRENT IRRIGATION SCOPE CONNECTOR IS USED WITH THE TORRENT IRRIGATION TUBING TO PROVIDE IRRIGATION FLUIDS SUCH AS STERILE WATER DURING GASTROINTESTINAL ENDOSCOPIC PROCEDURES WHEN USED IN CONJUNCTION WITH AN IRRIGATION PUMP (OR ELECTROSURGICAL UNIT).: Brand: TORRENT

## (undated) DEVICE — MAT FLR ABSORBENT LG 4FT 10 2.5FT

## (undated) DEVICE — CANN O2 ETCO2 FITS ALL CONN CO2 SMPL A/ 7IN DISP LF

## (undated) DEVICE — GUIDE PIN 3.2MM X 343MM: Brand: TRIGEN

## (undated) DEVICE — DRESSING,GAUZE,XEROFORM,CURAD,5"X9",ST: Brand: CURAD

## (undated) DEVICE — TUBING, SUCTION, 1/4" X 10', STRAIGHT: Brand: MEDLINE

## (undated) DEVICE — COVER,C-ARM,41X74: Brand: MEDLINE

## (undated) DEVICE — LN SMPL CO2 SHTRM SD STREAM W/M LUER

## (undated) DEVICE — PROXIMATE RH ROTATING HEAD SKIN STAPLERS (35 WIDE) CONTAINS 35 STAINLESS STEEL STAPLES: Brand: PROXIMATE

## (undated) DEVICE — GOWN,SIRUS,POLYRNF,BRTHSLV,XLN/XL,20/CS: Brand: MEDLINE

## (undated) DEVICE — SPNG GZ WOVN 4X4IN 12PLY 10/BX STRL

## (undated) DEVICE — PAD,ABDOMINAL,8"X10",ST,LF: Brand: MEDLINE

## (undated) DEVICE — ADAPT CLN BIOGUARD AIR/H2O DISP

## (undated) DEVICE — SOL ISO/ALC 70PCT 4OZ

## (undated) DEVICE — APPL CHLORAPREP HI/LITE 26ML ORNG

## (undated) DEVICE — 4.0MM SHORT PILOT DRILL WITH AO CONNECTOR: Brand: TRIGEN

## (undated) DEVICE — PATIENT RETURN ELECTRODE, SINGLE-USE, CONTACT QUALITY MONITORING, ADULT, WITH 9FT CORD, FOR PATIENTS WEIGING OVER 33LBS. (15KG): Brand: MEGADYNE

## (undated) DEVICE — UNDERCAST PADDING: Brand: DEROYAL

## (undated) DEVICE — DRAPE,U/ SHT,SPLIT,PLAS,STERIL: Brand: MEDLINE

## (undated) DEVICE — SUT VIC 0 CT1 36IN J946H

## (undated) DEVICE — TRIGEN LOW PROFILE SCREW 5.0MM X 55MM
Type: IMPLANTABLE DEVICE | Site: TIBIA | Status: NON-FUNCTIONAL
Brand: TRIGEN
Removed: 2025-02-25

## (undated) DEVICE — GLV SURG SIGNATURE ESSENTIAL PF LTX SZ8

## (undated) DEVICE — PENCL SMOKE/EVAC MEGADYNE TELESCP 10FT

## (undated) DEVICE — COVER,TABLE,44X90,STERILE: Brand: MEDLINE

## (undated) DEVICE — 3.0MM X 1000MM BALL TIP GUIDE ROD: Brand: TRIGEN

## (undated) DEVICE — SUT VIC 2/0 CT1 36IN

## (undated) DEVICE — TRIGEN LOW PROFILE SCREW 5.0MM X 35MM
Type: IMPLANTABLE DEVICE | Site: TIBIA | Status: NON-FUNCTIONAL
Brand: TRIGEN
Removed: 2025-02-25

## (undated) DEVICE — SENSR O2 OXIMAX FNGR A/ 18IN NONSTR

## (undated) DEVICE — BNDG,ELSTC,MATRIX,STRL,6"X5YD,LF,HOOK&LP: Brand: MEDLINE

## (undated) DEVICE — DISPOSABLE TOURNIQUET CUFF SINGLE BLADDER, SINGLE PORT AND QUICK CONNECT CONNECTOR: Brand: COLOR CUFF

## (undated) DEVICE — DRP C/ARMOR

## (undated) DEVICE — KT ORCA ORCAPOD DISP STRL

## (undated) DEVICE — 4.0MM LONG AO PILOT DRILL: Brand: TRIGEN

## (undated) DEVICE — PK ORTHO MINOR TOWER 40

## (undated) DEVICE — SPONGE,LAP,18"X18",DLX,XR,ST,5/PK,40/PK: Brand: MEDLINE